# Patient Record
Sex: MALE | Race: WHITE | Employment: OTHER | URBAN - METROPOLITAN AREA
[De-identification: names, ages, dates, MRNs, and addresses within clinical notes are randomized per-mention and may not be internally consistent; named-entity substitution may affect disease eponyms.]

---

## 2017-10-20 ENCOUNTER — HOSPITAL ENCOUNTER (OUTPATIENT)
Dept: CT IMAGING | Age: 67
Discharge: HOME | End: 2017-10-20
Attending: FAMILY MEDICINE | Admitting: FAMILY MEDICINE

## 2017-10-20 DIAGNOSIS — R91.1 PULMONARY NODULE: ICD-10-CM

## 2017-11-13 ENCOUNTER — HOSPITAL ENCOUNTER (OUTPATIENT)
Dept: OTHER | Age: 67
Discharge: HOME | End: 2017-11-13
Attending: INTERNAL MEDICINE | Admitting: INTERNAL MEDICINE

## 2017-11-13 LAB
FERRITIN: 50 NG/ML (ref 30–400)
IRON: 120 UG/DL (ref 59–158)
PCT TRANSFERRIN: 34 % (ref 10–44)
TOTAL IRON BINDING CAPACITY: 352 UG/DL (ref 250–450)
UNSATURATED IRON BINDING CAPACITY: 232 UG/DL (ref 110–370)

## 2017-12-31 ENCOUNTER — HOSPITAL ENCOUNTER (OUTPATIENT)
Dept: OTHER | Age: 67
Discharge: HOME | End: 2017-12-31
Attending: NURSE PRACTITIONER | Admitting: NURSE PRACTITIONER

## 2017-12-31 DIAGNOSIS — J18.9 SYMPTOMS OF PNEUMONIA: ICD-10-CM

## 2018-06-12 LAB — DIABETIC RETINOPATHY: NEGATIVE

## 2018-11-01 LAB
ALBUMIN SERPL-MCNC: NORMAL G/DL
ALP BLD-CCNC: NORMAL U/L
ALT SERPL-CCNC: NORMAL U/L
ANION GAP SERPL CALCULATED.3IONS-SCNC: NORMAL MMOL/L
AST SERPL-CCNC: NORMAL U/L
AVERAGE GLUCOSE: ABNORMAL
BILIRUB SERPL-MCNC: NORMAL MG/DL (ref 0.1–1.4)
BUN BLDV-MCNC: 18 MG/DL
CALCIUM SERPL-MCNC: NORMAL MG/DL
CHLORIDE BLD-SCNC: NORMAL MMOL/L
CHOLESTEROL, TOTAL: 151 MG/DL
CHOLESTEROL/HDL RATIO: NORMAL
CO2: NORMAL MMOL/L
CREAT SERPL-MCNC: 1 MG/DL
CREATININE, URINE: 80.5
GFR CALCULATED: NORMAL
GLUCOSE BLD-MCNC: 153 MG/DL
HBA1C MFR BLD: 7.1 %
HDLC SERPL-MCNC: 53 MG/DL (ref 35–70)
LDL CHOLESTEROL CALCULATED: 75 MG/DL (ref 0–160)
MICROALBUMIN/CREAT 24H UR: 360 MG/G{CREAT}
MICROALBUMIN/CREAT UR-RTO: 4
POTASSIUM SERPL-SCNC: 4.8 MMOL/L
SODIUM BLD-SCNC: NORMAL MMOL/L
TOTAL PROTEIN: NORMAL
TRIGL SERPL-MCNC: 117 MG/DL
TSH SERPL DL<=0.05 MIU/L-ACNC: 2.6 UIU/ML
VLDLC SERPL CALC-MCNC: 23 MG/DL

## 2018-11-19 ENCOUNTER — HOSPITAL ENCOUNTER (OUTPATIENT)
Age: 68
Setting detail: SPECIMEN
Discharge: HOME | End: 2018-11-19
Payer: MEDICARE

## 2018-11-19 LAB
FERRITIN: 33 NG/ML (ref 30–400)
IRON: 107 UG/DL (ref 59–158)
PCT TRANSFERRIN: 30 % (ref 10–44)
TOTAL IRON BINDING CAPACITY: 362 UG/DL (ref 250–450)
UNSATURATED IRON BINDING CAPACITY: 255 UG/DL (ref 110–370)

## 2018-11-19 PROCEDURE — 83540 ASSAY OF IRON: CPT

## 2018-11-19 PROCEDURE — 82728 ASSAY OF FERRITIN: CPT

## 2018-11-19 PROCEDURE — 83550 IRON BINDING TEST: CPT

## 2019-05-07 ENCOUNTER — TELEPHONE (OUTPATIENT)
Dept: FAMILY MEDICINE CLINIC | Age: 69
End: 2019-05-07

## 2019-05-07 RX ORDER — LISINOPRIL 5 MG/1
5 TABLET ORAL DAILY
Qty: 90 TABLET | Refills: 0 | Status: SHIPPED | OUTPATIENT
Start: 2019-05-07 | End: 2019-05-08 | Stop reason: SDUPTHER

## 2019-05-07 RX ORDER — PIOGLITAZONEHYDROCHLORIDE 30 MG/1
30 TABLET ORAL DAILY
Qty: 90 TABLET | Refills: 0 | Status: SHIPPED | OUTPATIENT
Start: 2019-05-07 | End: 2019-08-01 | Stop reason: SDUPTHER

## 2019-05-07 RX ORDER — DILTIAZEM HYDROCHLORIDE 300 MG/1
300 CAPSULE, COATED, EXTENDED RELEASE ORAL DAILY
Qty: 90 CAPSULE | Refills: 0 | Status: SHIPPED | OUTPATIENT
Start: 2019-05-07 | End: 2019-08-01 | Stop reason: SDUPTHER

## 2019-05-07 RX ORDER — ATORVASTATIN CALCIUM 20 MG/1
20 TABLET, FILM COATED ORAL DAILY
Qty: 90 TABLET | Refills: 0 | Status: SHIPPED | OUTPATIENT
Start: 2019-05-07 | End: 2019-05-08 | Stop reason: SDUPTHER

## 2019-05-07 NOTE — TELEPHONE ENCOUNTER
----- Message from Milly Martinez MA sent at 5/7/2019 11:49 AM EDT -----      ----- Message -----  From: Alonzo Whalen  Sent: 5/7/2019  10:57 AM  To: Milly Martinez MA    PT REQUESTING REFILLS OF  METFORMIN 500 MG 2 AM 1 AT LUNCH 2 PM  [facility]                                                        ATORVASTATIN  20 MG 1QHS  [facility]                                                         LISINOPRIL 5MG  1 QD [facility]                                                        PIOGLITAZONE HCL  30 MG 1 QD  [facility]                                                        DILTIAZEM HCL  MG 1 QD  [facility]

## 2019-05-08 ENCOUNTER — TELEPHONE (OUTPATIENT)
Dept: FAMILY MEDICINE CLINIC | Age: 69
End: 2019-05-08

## 2019-05-08 RX ORDER — LISINOPRIL 5 MG/1
5 TABLET ORAL DAILY
Qty: 90 TABLET | Refills: 0 | Status: SHIPPED | OUTPATIENT
Start: 2019-05-08 | End: 2019-08-01 | Stop reason: SDUPTHER

## 2019-05-08 RX ORDER — ATORVASTATIN CALCIUM 20 MG/1
20 TABLET, FILM COATED ORAL DAILY
Qty: 90 TABLET | Refills: 0 | Status: SHIPPED | OUTPATIENT
Start: 2019-05-08 | End: 2019-08-01 | Stop reason: SDUPTHER

## 2019-05-08 NOTE — TELEPHONE ENCOUNTER
PER [facility]QST, RESENT MEDS TO [facility]  Electronically signed by Thong Oliveros MA on 5/8/2019 at 12:05 PM

## 2019-05-08 NOTE — TELEPHONE ENCOUNTER
----- Message from Corinn Severs sent at 5/8/2019  9:41 AM EDT -----  Contact: PATIENT  PRESCRIPTION FOR THESE WERE SENT TO [facility] ON [address] IN ERROR. PLEASE CANCEL THE SCRIPTS AT [facility],  SO INSURANCE WILL APPROVE SCRIPTS AT [facility]. METFORMIN  LISINOPRIL  ATORVASTATIN              PLEASE SEND THESE SCRIPTS TO [facility]  PHARMACY ON [facility].

## 2019-06-12 DIAGNOSIS — E11.9 TYPE 2 DIABETES MELLITUS WITHOUT COMPLICATION, WITHOUT LONG-TERM CURRENT USE OF INSULIN (HCC): Primary | ICD-10-CM

## 2019-07-09 DIAGNOSIS — E11.9 TYPE 2 DIABETES MELLITUS WITHOUT COMPLICATION, WITHOUT LONG-TERM CURRENT USE OF INSULIN (HCC): ICD-10-CM

## 2019-07-09 RX ORDER — DAPAGLIFLOZIN 5 MG/1
TABLET, FILM COATED ORAL
Qty: 30 TABLET | Refills: 0 | Status: SHIPPED | OUTPATIENT
Start: 2019-07-09 | End: 2019-08-01 | Stop reason: SDUPTHER

## 2019-07-13 DIAGNOSIS — R76.11 MANTOUX: POSITIVE: ICD-10-CM

## 2019-07-13 DIAGNOSIS — I10 HYPERTENSION, ESSENTIAL: ICD-10-CM

## 2019-07-13 DIAGNOSIS — Z86.711 HISTORY OF PULMONARY EMBOLISM: ICD-10-CM

## 2019-07-13 RX ORDER — NIACINAMIDE 500 MG
500 TABLET ORAL 2 TIMES DAILY WITH MEALS
COMMUNITY

## 2019-07-13 RX ORDER — LATANOPROST 50 UG/ML
SOLUTION/ DROPS OPHTHALMIC
COMMUNITY
Start: 2019-05-19 | End: 2020-10-02

## 2019-08-01 ENCOUNTER — OFFICE VISIT (OUTPATIENT)
Dept: FAMILY MEDICINE CLINIC | Age: 69
End: 2019-08-01
Payer: MEDICARE

## 2019-08-01 VITALS
HEART RATE: 80 BPM | SYSTOLIC BLOOD PRESSURE: 122 MMHG | BODY MASS INDEX: 31.31 KG/M2 | DIASTOLIC BLOOD PRESSURE: 80 MMHG | HEIGHT: 72 IN | WEIGHT: 231.2 LBS

## 2019-08-01 DIAGNOSIS — E11.9 TYPE 2 DIABETES MELLITUS WITHOUT COMPLICATION, WITHOUT LONG-TERM CURRENT USE OF INSULIN (HCC): ICD-10-CM

## 2019-08-01 DIAGNOSIS — D75.1 POLYCYTHEMIA: Chronic | ICD-10-CM

## 2019-08-01 DIAGNOSIS — I10 ESSENTIAL HYPERTENSION: Chronic | ICD-10-CM

## 2019-08-01 DIAGNOSIS — H40.89 OTHER SPECIFIED GLAUCOMA, UNSPECIFIED LATERALITY: ICD-10-CM

## 2019-08-01 DIAGNOSIS — E11.9 TYPE 2 DIABETES MELLITUS WITHOUT COMPLICATION, WITHOUT LONG-TERM CURRENT USE OF INSULIN (HCC): Primary | ICD-10-CM

## 2019-08-01 PROCEDURE — 99214 OFFICE O/P EST MOD 30 MIN: CPT | Performed by: FAMILY MEDICINE

## 2019-08-01 RX ORDER — ATORVASTATIN CALCIUM 20 MG/1
20 TABLET, FILM COATED ORAL DAILY
Qty: 90 TABLET | Refills: 1 | Status: SHIPPED | OUTPATIENT
Start: 2019-08-01 | End: 2020-01-06

## 2019-08-01 RX ORDER — LISINOPRIL 5 MG/1
5 TABLET ORAL DAILY
Qty: 90 TABLET | Refills: 1 | Status: SHIPPED | OUTPATIENT
Start: 2019-08-01 | End: 2020-01-07

## 2019-08-01 RX ORDER — PIOGLITAZONEHYDROCHLORIDE 30 MG/1
30 TABLET ORAL DAILY
Qty: 90 TABLET | Refills: 1 | Status: SHIPPED | OUTPATIENT
Start: 2019-08-01 | End: 2020-02-06 | Stop reason: SDUPTHER

## 2019-08-01 RX ORDER — DILTIAZEM HYDROCHLORIDE 300 MG/1
300 CAPSULE, COATED, EXTENDED RELEASE ORAL DAILY
Qty: 90 CAPSULE | Refills: 1 | Status: SHIPPED | OUTPATIENT
Start: 2019-08-01 | End: 2020-02-06 | Stop reason: SDUPTHER

## 2019-08-01 ASSESSMENT — ENCOUNTER SYMPTOMS
CHEST TIGHTNESS: 0
ABDOMINAL PAIN: 0
SHORTNESS OF BREATH: 0

## 2019-08-02 LAB
A/G RATIO: 2.4 (ref 1.1–2.2)
ALBUMIN SERPL-MCNC: 5 G/DL (ref 3.4–5)
ALP BLD-CCNC: 61 U/L (ref 40–129)
ALT SERPL-CCNC: 15 U/L (ref 10–40)
ANION GAP SERPL CALCULATED.3IONS-SCNC: 18 MMOL/L (ref 3–16)
AST SERPL-CCNC: 12 U/L (ref 15–37)
BILIRUB SERPL-MCNC: 0.4 MG/DL (ref 0–1)
BUN BLDV-MCNC: 17 MG/DL (ref 7–20)
CALCIUM SERPL-MCNC: 10.2 MG/DL (ref 8.3–10.6)
CHLORIDE BLD-SCNC: 103 MMOL/L (ref 99–110)
CHOLESTEROL, TOTAL: 167 MG/DL (ref 0–199)
CO2: 23 MMOL/L (ref 21–32)
CREAT SERPL-MCNC: 0.8 MG/DL (ref 0.8–1.3)
ESTIMATED AVERAGE GLUCOSE: 159.9 MG/DL
GFR AFRICAN AMERICAN: >60
GFR NON-AFRICAN AMERICAN: >60
GLOBULIN: 2.1 G/DL
GLUCOSE BLD-MCNC: 165 MG/DL (ref 70–99)
HBA1C MFR BLD: 7.2 %
HCT VFR BLD CALC: 48.4 % (ref 40.5–52.5)
HDLC SERPL-MCNC: 59 MG/DL (ref 40–60)
HEMOGLOBIN: 16.2 G/DL (ref 13.5–17.5)
LDL CHOLESTEROL CALCULATED: 83 MG/DL
MCH RBC QN AUTO: 32.5 PG (ref 26–34)
MCHC RBC AUTO-ENTMCNC: 33.4 G/DL (ref 31–36)
MCV RBC AUTO: 97.3 FL (ref 80–100)
PDW BLD-RTO: 14.1 % (ref 12.4–15.4)
PLATELET # BLD: 258 K/UL (ref 135–450)
PMV BLD AUTO: 7.6 FL (ref 5–10.5)
POTASSIUM SERPL-SCNC: 4.9 MMOL/L (ref 3.5–5.1)
RBC # BLD: 4.97 M/UL (ref 4.2–5.9)
SODIUM BLD-SCNC: 144 MMOL/L (ref 136–145)
TOTAL PROTEIN: 7.1 G/DL (ref 6.4–8.2)
TRIGL SERPL-MCNC: 126 MG/DL (ref 0–150)
VLDLC SERPL CALC-MCNC: 25 MG/DL
WBC # BLD: 7.5 K/UL (ref 4–11)

## 2019-10-16 ENCOUNTER — TELEPHONE (OUTPATIENT)
Dept: FAMILY MEDICINE CLINIC | Age: 69
End: 2019-10-16

## 2019-10-16 DIAGNOSIS — E11.9 TYPE 2 DIABETES MELLITUS WITHOUT COMPLICATION, WITHOUT LONG-TERM CURRENT USE OF INSULIN (HCC): ICD-10-CM

## 2019-11-19 ENCOUNTER — HOSPITAL ENCOUNTER (OUTPATIENT)
Age: 69
Setting detail: SPECIMEN
Discharge: HOME | End: 2019-11-19
Payer: MEDICARE

## 2019-11-19 LAB
FERRITIN: 44 NG/ML (ref 30–400)
IRON: 74 UG/DL (ref 59–158)
PCT TRANSFERRIN: 20 % (ref 10–44)
TOTAL IRON BINDING CAPACITY: 368 UG/DL (ref 250–450)
UNSATURATED IRON BINDING CAPACITY: 294 UG/DL (ref 110–370)

## 2019-11-19 PROCEDURE — 83540 ASSAY OF IRON: CPT

## 2019-11-19 PROCEDURE — 83550 IRON BINDING TEST: CPT

## 2019-11-19 PROCEDURE — 82728 ASSAY OF FERRITIN: CPT

## 2020-01-06 RX ORDER — ATORVASTATIN CALCIUM 20 MG/1
TABLET, FILM COATED ORAL
Qty: 90 TABLET | Refills: 0 | Status: SHIPPED | OUTPATIENT
Start: 2020-01-06 | End: 2020-02-06 | Stop reason: SDUPTHER

## 2020-01-07 RX ORDER — LISINOPRIL 5 MG/1
TABLET ORAL
Qty: 90 TABLET | Refills: 0 | Status: SHIPPED | OUTPATIENT
Start: 2020-01-07 | End: 2020-02-06 | Stop reason: SDUPTHER

## 2020-01-29 ENCOUNTER — OFFICE VISIT (OUTPATIENT)
Dept: FAMILY MEDICINE CLINIC | Age: 70
End: 2020-01-29
Payer: MEDICARE

## 2020-01-29 ENCOUNTER — HOSPITAL ENCOUNTER (OUTPATIENT)
Age: 70
Discharge: HOME | End: 2020-01-29
Payer: MEDICARE

## 2020-01-29 VITALS
HEART RATE: 99 BPM | BODY MASS INDEX: 32.18 KG/M2 | HEIGHT: 72 IN | OXYGEN SATURATION: 95 % | DIASTOLIC BLOOD PRESSURE: 80 MMHG | WEIGHT: 237.6 LBS | TEMPERATURE: 98.5 F | SYSTOLIC BLOOD PRESSURE: 120 MMHG

## 2020-01-29 LAB
ALBUMIN SERPL-MCNC: 4.7 GM/DL (ref 3.4–5)
ALP BLD-CCNC: 76 IU/L (ref 40–129)
ALT SERPL-CCNC: 19 U/L (ref 10–40)
ANION GAP SERPL CALCULATED.3IONS-SCNC: 16 MMOL/L (ref 4–16)
AST SERPL-CCNC: 15 IU/L (ref 15–37)
BASOPHILS ABSOLUTE: 0 K/CU MM
BASOPHILS RELATIVE PERCENT: 0.5 % (ref 0–1)
BILIRUB SERPL-MCNC: 0.5 MG/DL (ref 0–1)
BUN BLDV-MCNC: 15 MG/DL (ref 6–23)
CALCIUM SERPL-MCNC: 10.1 MG/DL (ref 8.3–10.6)
CHLORIDE BLD-SCNC: 99 MMOL/L (ref 99–110)
CO2: 24 MMOL/L (ref 21–32)
CREAT SERPL-MCNC: 0.9 MG/DL (ref 0.9–1.3)
DIFFERENTIAL TYPE: ABNORMAL
EOSINOPHILS ABSOLUTE: 0.1 K/CU MM
EOSINOPHILS RELATIVE PERCENT: 1.5 % (ref 0–3)
GFR AFRICAN AMERICAN: >60 ML/MIN/1.73M2
GFR NON-AFRICAN AMERICAN: >60 ML/MIN/1.73M2
GLUCOSE BLD-MCNC: 213 MG/DL (ref 70–99)
HCT VFR BLD CALC: 52 % (ref 42–52)
HEMOGLOBIN: 16.5 GM/DL (ref 13.5–18)
IMMATURE NEUTROPHIL %: 0.3 % (ref 0–0.43)
LYMPHOCYTES ABSOLUTE: 2 K/CU MM
LYMPHOCYTES RELATIVE PERCENT: 26.6 % (ref 24–44)
MCH RBC QN AUTO: 31.5 PG (ref 27–31)
MCHC RBC AUTO-ENTMCNC: 31.7 % (ref 32–36)
MCV RBC AUTO: 99.4 FL (ref 78–100)
MONOCYTES ABSOLUTE: 0.9 K/CU MM
MONOCYTES RELATIVE PERCENT: 12.7 % (ref 0–4)
NUCLEATED RBC %: 0 %
PDW BLD-RTO: 13.5 % (ref 11.7–14.9)
PLATELET # BLD: 279 K/CU MM (ref 140–440)
PMV BLD AUTO: 9.2 FL (ref 7.5–11.1)
POTASSIUM SERPL-SCNC: 4.8 MMOL/L (ref 3.5–5.1)
RBC # BLD: 5.23 M/CU MM (ref 4.6–6.2)
SEGMENTED NEUTROPHILS ABSOLUTE COUNT: 4.3 K/CU MM
SEGMENTED NEUTROPHILS RELATIVE PERCENT: 58.4 % (ref 36–66)
SODIUM BLD-SCNC: 139 MMOL/L (ref 135–145)
TOTAL IMMATURE NEUTOROPHIL: 0.02 K/CU MM
TOTAL NUCLEATED RBC: 0 K/CU MM
TOTAL PROTEIN: 7 GM/DL (ref 6.4–8.2)
WBC # BLD: 7.4 K/CU MM (ref 4–10.5)

## 2020-01-29 PROCEDURE — 99213 OFFICE O/P EST LOW 20 MIN: CPT | Performed by: NURSE PRACTITIONER

## 2020-01-29 PROCEDURE — 1123F ACP DISCUSS/DSCN MKR DOCD: CPT | Performed by: NURSE PRACTITIONER

## 2020-01-29 PROCEDURE — G8417 CALC BMI ABV UP PARAM F/U: HCPCS | Performed by: NURSE PRACTITIONER

## 2020-01-29 PROCEDURE — 4040F PNEUMOC VAC/ADMIN/RCVD: CPT | Performed by: NURSE PRACTITIONER

## 2020-01-29 PROCEDURE — 36415 COLL VENOUS BLD VENIPUNCTURE: CPT

## 2020-01-29 PROCEDURE — 87177 OVA AND PARASITES SMEARS: CPT

## 2020-01-29 PROCEDURE — 87046 STOOL CULTR AEROBIC BACT EA: CPT

## 2020-01-29 PROCEDURE — 3017F COLORECTAL CA SCREEN DOC REV: CPT | Performed by: NURSE PRACTITIONER

## 2020-01-29 PROCEDURE — 87077 CULTURE AEROBIC IDENTIFY: CPT

## 2020-01-29 PROCEDURE — 87045 FECES CULTURE AEROBIC BACT: CPT

## 2020-01-29 PROCEDURE — 1036F TOBACCO NON-USER: CPT | Performed by: NURSE PRACTITIONER

## 2020-01-29 PROCEDURE — 80053 COMPREHEN METABOLIC PANEL: CPT

## 2020-01-29 PROCEDURE — 87324 CLOSTRIDIUM AG IA: CPT

## 2020-01-29 PROCEDURE — G8427 DOCREV CUR MEDS BY ELIG CLIN: HCPCS | Performed by: NURSE PRACTITIONER

## 2020-01-29 PROCEDURE — 87209 SMEAR COMPLEX STAIN: CPT

## 2020-01-29 PROCEDURE — G8484 FLU IMMUNIZE NO ADMIN: HCPCS | Performed by: NURSE PRACTITIONER

## 2020-01-29 PROCEDURE — 85025 COMPLETE CBC W/AUTO DIFF WBC: CPT

## 2020-01-29 RX ORDER — BRIMONIDINE TARTRATE/TIMOLOL 0.2%-0.5%
1 DROPS OPHTHALMIC (EYE) 2 TIMES DAILY
COMMUNITY
Start: 2020-01-27 | End: 2020-10-02

## 2020-01-29 ASSESSMENT — ENCOUNTER SYMPTOMS
DIARRHEA: 1
NAUSEA: 0
COUGH: 0
FLATUS: 1
BLOATING: 1
ABDOMINAL PAIN: 0
RESPIRATORY NEGATIVE: 1
VOMITING: 0

## 2020-01-29 NOTE — PROGRESS NOTES
Medication Sig Dispense Refill    COMBIGAN 0.2-0.5 % ophthalmic solution Apply 1 drop to eye 2 times daily      lisinopril (PRINIVIL;ZESTRIL) 5 MG tablet TAKE 1 TABLET BY MOUTH ONE TIME A DAY  90 tablet 0    atorvastatin (LIPITOR) 20 MG tablet TAKE 1 TABLET BY MOUTH ONE TIME A DAY  90 tablet 0    pioglitazone (ACTOS) 30 MG tablet Take 1 tablet by mouth daily 90 tablet 1    metFORMIN (GLUCOPHAGE) 500 MG tablet Take 1 tablet by mouth 2 times daily (with meals) TAKE 2 TABS AM, 1 TAB AT NOON, 2 TABS  tablet 1    dapagliflozin (FARXIGA) 5 MG tablet TAKE 1 TABLET BY MOUTH ONE TIME A DAY 30 tablet 5    diltiazem (CARTIA XT) 300 MG extended release capsule Take 1 capsule by mouth daily 90 capsule 1    latanoprost (XALATAN) 0.005 % ophthalmic solution       niacinamide 500 MG tablet Take 500 mg by mouth 2 times daily (with meals)      Multiple Vitamin (MULTIVITAMIN PO) Take  by mouth daily.  PANTOTHENIC ACID PO Take 500 mg by mouth daily.  Multiple Vitamins-Minerals (OCUVITE) TABS oral tablet Take 1 tablet by mouth daily.  vitamin D3 (CHOLECALCIFEROL) 400 units TABS tablet Take by mouth daily        No current facility-administered medications for this visit. Past medical, family,surgical history reviewed today. Objective:  /80   Pulse 99   Temp 98.5 °F (36.9 °C) (Oral)   Ht 5' 11.5\" (1.816 m)   Wt 237 lb 9.6 oz (107.8 kg)   SpO2 95%   BMI 32.68 kg/m²   BP Readings from Last 3 Encounters:   01/29/20 120/80   08/01/19 122/80   10/20/15 142/84     Wt Readings from Last 3 Encounters:   01/29/20 237 lb 9.6 oz (107.8 kg)   08/01/19 231 lb 3.2 oz (104.9 kg)   10/20/15 248 lb (112.5 kg)         Physical Exam  Constitutional:       Appearance: Normal appearance. HENT:      Head: Normocephalic. Neck:      Musculoskeletal: Neck supple. Cardiovascular:      Rate and Rhythm: Normal rate and regular rhythm. Heart sounds: Normal heart sounds.    Pulmonary:      Effort: patient. Questions answered. Patient verbalizes understanding and agrees with plan. After visit summary provided. Advised to call for any problems, questions, or concerns. Return if symptoms worsen or fail to improve.                                            Signed:  MARIANELA Pedro CNP  01/29/20  10:44 AM

## 2020-01-30 LAB
CLOSTRIDIUM DIFFICILE, PCR: NORMAL
CLOSTRIDIUM DIFFICILE, PCR: NORMAL

## 2020-02-01 LAB
CULTURE: NORMAL
Lab: NORMAL
SPECIMEN: NORMAL

## 2020-02-04 LAB
OVA AND PARASITE WET MOUNT: NEGATIVE
TRICHROME SMEAR, STOOL O&P: NEGATIVE
TRICHROME SMEAR, STOOL O&P: NORMAL

## 2020-02-06 ENCOUNTER — OFFICE VISIT (OUTPATIENT)
Dept: FAMILY MEDICINE CLINIC | Age: 70
End: 2020-02-06
Payer: MEDICARE

## 2020-02-06 VITALS
WEIGHT: 234.6 LBS | HEART RATE: 90 BPM | DIASTOLIC BLOOD PRESSURE: 84 MMHG | BODY MASS INDEX: 31.77 KG/M2 | OXYGEN SATURATION: 96 % | HEIGHT: 72 IN | SYSTOLIC BLOOD PRESSURE: 120 MMHG

## 2020-02-06 DIAGNOSIS — E11.9 TYPE 2 DIABETES MELLITUS WITHOUT COMPLICATION, WITHOUT LONG-TERM CURRENT USE OF INSULIN (HCC): ICD-10-CM

## 2020-02-06 PROCEDURE — 99214 OFFICE O/P EST MOD 30 MIN: CPT | Performed by: FAMILY MEDICINE

## 2020-02-06 RX ORDER — ATORVASTATIN CALCIUM 20 MG/1
TABLET, FILM COATED ORAL
Qty: 90 TABLET | Refills: 1 | Status: SHIPPED | OUTPATIENT
Start: 2020-02-06 | End: 2020-09-17 | Stop reason: SDUPTHER

## 2020-02-06 RX ORDER — LISINOPRIL 5 MG/1
TABLET ORAL
Qty: 90 TABLET | Refills: 1 | Status: SHIPPED | OUTPATIENT
Start: 2020-02-06 | End: 2020-11-05 | Stop reason: SDUPTHER

## 2020-02-06 RX ORDER — PIOGLITAZONEHYDROCHLORIDE 30 MG/1
30 TABLET ORAL DAILY
Qty: 90 TABLET | Refills: 1 | Status: SHIPPED | OUTPATIENT
Start: 2020-02-06 | End: 2020-09-17 | Stop reason: SDUPTHER

## 2020-02-06 RX ORDER — CHOLESTYRAMINE 4 G/9G
1 POWDER, FOR SUSPENSION ORAL 2 TIMES DAILY
Qty: 40 PACKET | Refills: 1 | Status: SHIPPED | OUTPATIENT
Start: 2020-02-06 | End: 2020-10-02

## 2020-02-06 RX ORDER — DILTIAZEM HYDROCHLORIDE 300 MG/1
300 CAPSULE, COATED, EXTENDED RELEASE ORAL DAILY
Qty: 90 CAPSULE | Refills: 1 | Status: SHIPPED | OUTPATIENT
Start: 2020-02-06 | End: 2020-09-17 | Stop reason: SDUPTHER

## 2020-02-06 ASSESSMENT — ENCOUNTER SYMPTOMS
DIARRHEA: 1
NAUSEA: 0
CHEST TIGHTNESS: 0
TROUBLE SWALLOWING: 0
SHORTNESS OF BREATH: 0
VOMITING: 0
ABDOMINAL PAIN: 0
BLOOD IN STOOL: 0
WHEEZING: 0
EYE PAIN: 0

## 2020-02-06 NOTE — PROGRESS NOTES
tablet Take by mouth daily        No current facility-administered medications for this visit. ALLERGIES  Allergies   Allergen Reactions    Pollen Extract        PHYSICAL EXAM    /84 (Site: Left Upper Arm, Position: Sitting, Cuff Size: Medium Adult)   Pulse 90   Ht 5' 11.5\" (1.816 m)   Wt 234 lb 9.6 oz (106.4 kg)   SpO2 96%   BMI 32.26 kg/m²     Physical Exam  Vitals signs and nursing note reviewed. Constitutional:       Appearance: He is well-developed. HENT:      Head: Normocephalic and atraumatic. Eyes:      Pupils: Pupils are equal, round, and reactive to light. Neck:      Musculoskeletal: Normal range of motion and neck supple. Cardiovascular:      Rate and Rhythm: Normal rate and regular rhythm. Heart sounds: Normal heart sounds. Pulmonary:      Effort: Pulmonary effort is normal.      Breath sounds: Normal breath sounds. Abdominal:      Palpations: Abdomen is soft. Musculoskeletal: Normal range of motion. Skin:     General: Skin is warm and dry. Neurological:      Mental Status: He is alert and oriented to person, place, and time. Psychiatric:         Thought Content: Thought content normal.         ASSESSMENT & PLAN     Diagnosis Orders   1. Diarrhea, unspecified type  Amb External Referral To Gastroenterology   2. Need for prophylactic vaccination against diphtheria-tetanus-pertussis (DTP)  Tdap (ADACEL) 5-2-15.5 LF-MCG/0.5 injection   3. Type 2 diabetes mellitus without complication, without long-term current use of insulin ([facility])   DIABETES FOOT EXAM    dapagliflozin (FARXIGA) 5 MG tablet    HEMOGLOBIN A1C   Extensive discussion carried out- at this point continue on plenty of clear liquids and avoid milk products if possible. Add a probiotic(otc) on a daily basis and will treat him with generic Questran 4 g twice daily. He is due for an A1c today- this will be done. We will also check a diatherix swab for stool antigens.   He is to have an appointment made with Dr. Rice Jorge his GI physician and stay on usual regimen otherwise. He will monitor sugars and symptoms. He is to call with questions or problems. Return if symptoms worsen or fail to improve.          Electronically signed by Faustino Gardiner MD on 2/6/2020

## 2020-02-07 LAB
ESTIMATED AVERAGE GLUCOSE: 171.4 MG/DL
HBA1C MFR BLD: 7.6 %

## 2020-03-04 ENCOUNTER — HOSPITAL ENCOUNTER (OUTPATIENT)
Dept: GENERAL RADIOLOGY | Age: 70
Discharge: HOME | End: 2020-03-04
Payer: MEDICARE

## 2020-03-04 PROCEDURE — 74248 X-RAY SM INT F-THRU STD: CPT

## 2020-03-16 RX ORDER — ATORVASTATIN CALCIUM 20 MG/1
TABLET, FILM COATED ORAL
Qty: 90 TABLET | Refills: 0 | OUTPATIENT
Start: 2020-03-16

## 2020-05-22 ENCOUNTER — TELEPHONE (OUTPATIENT)
Dept: FAMILY MEDICINE CLINIC | Age: 70
End: 2020-05-22

## 2020-06-02 ENCOUNTER — OFFICE VISIT (OUTPATIENT)
Dept: FAMILY MEDICINE CLINIC | Age: 70
End: 2020-06-02
Payer: MEDICARE

## 2020-06-02 VITALS
BODY MASS INDEX: 31.97 KG/M2 | DIASTOLIC BLOOD PRESSURE: 80 MMHG | HEART RATE: 102 BPM | SYSTOLIC BLOOD PRESSURE: 118 MMHG | HEIGHT: 72 IN | OXYGEN SATURATION: 96 % | TEMPERATURE: 97.9 F | WEIGHT: 236 LBS

## 2020-06-02 DIAGNOSIS — R53.82 CHRONIC FATIGUE: ICD-10-CM

## 2020-06-02 DIAGNOSIS — I10 HYPERTENSION, ESSENTIAL: ICD-10-CM

## 2020-06-02 DIAGNOSIS — E11.9 TYPE 2 DIABETES MELLITUS WITHOUT COMPLICATION, WITHOUT LONG-TERM CURRENT USE OF INSULIN (HCC): ICD-10-CM

## 2020-06-02 PROBLEM — K59.1 FUNCTIONAL DIARRHEA: Status: ACTIVE | Noted: 2020-06-02

## 2020-06-02 LAB
A/G RATIO: 2.2 (ref 1.1–2.2)
ALBUMIN SERPL-MCNC: 4.6 G/DL (ref 3.4–5)
ALP BLD-CCNC: 56 U/L (ref 40–129)
ALT SERPL-CCNC: 13 U/L (ref 10–40)
ANION GAP SERPL CALCULATED.3IONS-SCNC: 13 MMOL/L (ref 3–16)
AST SERPL-CCNC: 12 U/L (ref 15–37)
BILIRUB SERPL-MCNC: 0.5 MG/DL (ref 0–1)
BUN BLDV-MCNC: 13 MG/DL (ref 7–20)
CALCIUM SERPL-MCNC: 9.7 MG/DL (ref 8.3–10.6)
CHLORIDE BLD-SCNC: 104 MMOL/L (ref 99–110)
CHOLESTEROL, TOTAL: 146 MG/DL (ref 0–199)
CO2: 25 MMOL/L (ref 21–32)
CREAT SERPL-MCNC: 0.9 MG/DL (ref 0.8–1.3)
GFR AFRICAN AMERICAN: >60
GFR NON-AFRICAN AMERICAN: >60
GLOBULIN: 2.1 G/DL
GLUCOSE BLD-MCNC: 192 MG/DL (ref 70–99)
HCT VFR BLD CALC: 49.2 % (ref 40.5–52.5)
HDLC SERPL-MCNC: 47 MG/DL (ref 40–60)
HEMOGLOBIN: 16.4 G/DL (ref 13.5–17.5)
LDL CHOLESTEROL CALCULATED: 68 MG/DL
MCH RBC QN AUTO: 32.2 PG (ref 26–34)
MCHC RBC AUTO-ENTMCNC: 33.4 G/DL (ref 31–36)
MCV RBC AUTO: 96.4 FL (ref 80–100)
PDW BLD-RTO: 14.1 % (ref 12.4–15.4)
PLATELET # BLD: 306 K/UL (ref 135–450)
PMV BLD AUTO: 7.4 FL (ref 5–10.5)
POTASSIUM SERPL-SCNC: 4.6 MMOL/L (ref 3.5–5.1)
RBC # BLD: 5.1 M/UL (ref 4.2–5.9)
SODIUM BLD-SCNC: 142 MMOL/L (ref 136–145)
TOTAL PROTEIN: 6.7 G/DL (ref 6.4–8.2)
TRIGL SERPL-MCNC: 154 MG/DL (ref 0–150)
TSH REFLEX: 3.5 UIU/ML (ref 0.27–4.2)
VLDLC SERPL CALC-MCNC: 31 MG/DL
WBC # BLD: 6.6 K/UL (ref 4–11)

## 2020-06-02 PROCEDURE — 3051F HG A1C>EQUAL 7.0%<8.0%: CPT | Performed by: FAMILY MEDICINE

## 2020-06-02 PROCEDURE — 99214 OFFICE O/P EST MOD 30 MIN: CPT | Performed by: FAMILY MEDICINE

## 2020-06-02 ASSESSMENT — ENCOUNTER SYMPTOMS
SHORTNESS OF BREATH: 0
EYE PAIN: 0
ABDOMINAL PAIN: 0
VOMITING: 0
NAUSEA: 0
BLOOD IN STOOL: 0
WHEEZING: 0
DIARRHEA: 0
TROUBLE SWALLOWING: 0
CHEST TIGHTNESS: 0

## 2020-06-02 NOTE — PROGRESS NOTES
facility-administered medications for this visit. ALLERGIES  Allergies   Allergen Reactions    Bee Pollen     Pollen Extract        PHYSICAL EXAM    /80 (Site: Right Upper Arm, Position: Sitting, Cuff Size: Large Adult)   Pulse 102   Temp 97.9 °F (36.6 °C)   Ht 5' 11.5\" (1.816 m)   Wt 236 lb (107 kg)   SpO2 96%   BMI 32.46 kg/m²     Physical Exam  Vitals signs and nursing note reviewed. Constitutional:       General: He is not in acute distress. Appearance: Normal appearance. He is well-developed. He is not ill-appearing or toxic-appearing. HENT:      Head: Normocephalic and atraumatic. Nose: Nose normal.      Mouth/Throat:      Mouth: Mucous membranes are moist.      Pharynx: Oropharynx is clear. Eyes:      Pupils: Pupils are equal, round, and reactive to light. Neck:      Musculoskeletal: Normal range of motion and neck supple. No neck rigidity. Cardiovascular:      Rate and Rhythm: Normal rate and regular rhythm. Heart sounds: Normal heart sounds. Pulmonary:      Effort: Pulmonary effort is normal.      Breath sounds: Normal breath sounds. Abdominal:      Palpations: Abdomen is soft. Musculoskeletal: Normal range of motion. Skin:     General: Skin is warm and dry. Neurological:      General: No focal deficit present. Mental Status: He is alert and oriented to person, place, and time. Mental status is at baseline. Cranial Nerves: No cranial nerve deficit. Psychiatric:         Mood and Affect: Mood normal.         Behavior: Behavior normal.         Thought Content: Thought content normal.         ASSESSMENT & PLAN     Diagnosis Orders   1. Type 2 diabetes mellitus without complication, without long-term current use of insulin ([facility])  HEMOGLOBIN A1C    TSH with Reflex    LIPID PANEL   2. Hypertension, essential  COMPREHENSIVE METABOLIC PANEL   3. Chronic fatigue  CBC   4.  Functional diarrhea  External Referral To Gastroenterology   Reinforced diabetic diet and increase in exercise. Monitor blood sugars. We will check CBC, CMP, A1c, TSH, lipid panel, and make referral to his GI specialist of choice in [place](Dr Law or Dr Jonathan Mcdaniel). He is to follow-up for today's testing results. Return in about 4 months (around 10/2/2020).          Electronically signed by Luma Andre MD on 6/2/2020

## 2020-06-03 LAB
ESTIMATED AVERAGE GLUCOSE: 168.6 MG/DL
HBA1C MFR BLD: 7.5 %

## 2020-06-24 ENCOUNTER — VIRTUAL VISIT (OUTPATIENT)
Dept: FAMILY MEDICINE CLINIC | Age: 70
End: 2020-06-24
Payer: MEDICARE

## 2020-06-24 VITALS — HEIGHT: 72 IN | WEIGHT: 233 LBS | BODY MASS INDEX: 31.56 KG/M2 | TEMPERATURE: 98.7 F

## 2020-06-24 PROCEDURE — 4040F PNEUMOC VAC/ADMIN/RCVD: CPT | Performed by: FAMILY MEDICINE

## 2020-06-24 PROCEDURE — 3017F COLORECTAL CA SCREEN DOC REV: CPT | Performed by: FAMILY MEDICINE

## 2020-06-24 PROCEDURE — G0438 PPPS, INITIAL VISIT: HCPCS | Performed by: FAMILY MEDICINE

## 2020-06-24 PROCEDURE — 1123F ACP DISCUSS/DSCN MKR DOCD: CPT | Performed by: FAMILY MEDICINE

## 2020-06-24 RX ORDER — SENNOSIDES 8.6 MG/1
TABLET ORAL
COMMUNITY
Start: 2020-06-10 | End: 2021-02-03

## 2020-06-24 RX ORDER — MELOXICAM 15 MG/1
TABLET ORAL
COMMUNITY
Start: 2020-06-15 | End: 2020-10-02

## 2020-06-24 ASSESSMENT — PATIENT HEALTH QUESTIONNAIRE - PHQ9
SUM OF ALL RESPONSES TO PHQ QUESTIONS 1-9: 2
SUM OF ALL RESPONSES TO PHQ QUESTIONS 1-9: 2

## 2020-06-24 ASSESSMENT — LIFESTYLE VARIABLES: HOW OFTEN DO YOU HAVE A DRINK CONTAINING ALCOHOL: 0

## 2020-06-24 NOTE — PROGRESS NOTES
lb 9.6 oz (106.4 kg)     Vitals:    06/24/20 0904   Temp: 98.7 °F (37.1 °C)   TempSrc: Oral   Weight: 233 lb (105.7 kg)   Height: 5' 11.5\" (1.816 m)     Body mass index is 32.04 kg/m². Based upon direct observation of the patient, evaluation of cognition reveals recent and remote memory intact. Patient's complete Health Risk Assessment and screening values have been reviewed and are found in Flowsheets. The following problems were reviewed today and where indicated follow up appointments were made and/or referrals ordered. Positive Risk Factor Screenings with Interventions:     General Health:  General  In general, how would you say your health is?: Fair  In the past 7 days, have you experienced any of the following? New or Increased Pain, New or Increased Fatigue, Loneliness, Social Isolation, Stress or Anger?: (!) New or Increased Pain  Do you get the social and emotional support that you need?: Yes  Do you have a Living Will?: Yes  General Health Risk Interventions:  · Pain issues: patient declines any further evaluation/treatment for this issue. Patient states that he has been seen for this issue, he has just pulled his hamstring muscle. Health Habits/Nutrition:  Health Habits/Nutrition  Do you exercise for at least 20 minutes 2-3 times per week?: (!) No  Have you lost any weight without trying in the past 3 months?: No  Do you eat fewer than 2 meals per day?: No  Have you seen a dentist within the past year?: Yes  Body mass index is 32.04 kg/m². Health Habits/Nutrition Interventions:  · Inadequate physical activity:  patient is not ready to increase his/her physical activity level at this time. Patient states as soon as his pulled muscle is healed that he will begin to exercise again.     Personalized Preventive Plan   Current Health Maintenance Status  Immunization History   Administered Date(s) Administered    Hep B/Hib (Comvax) 01/09/2009, 02/09/2009, 07/14/2009    Influenza Virus Vaccine Vitals/Constitutional/EENT/Resp/CV/GI//MS/Neuro/Skin/Heme-Lymph-Imm. Pursuant to the emergency declaration under the [address] [place] [address] and the [employer] and [employer] Act, this Virtual Visit was conducted with patient's (and/or legal guardian's) consent, to reduce the patient's risk of exposure to COVID-19 and provide necessary medical care. The patient (and/or legal guardian) has also been advised to contact this office for worsening conditions or problems, and seek emergency medical treatment and/or call 911 if deemed necessary. Patient identification was verified at the start of the visit: Yes    Total time spent for this encounter: 15 minutes    Services were provided through audio synchronous discussion virtually to substitute for in-person clinic visit. Patient and provider were located at their individual homes. --Eduardo Badillo LPN on 6/87/1697 at 2:87 AM    An electronic signature was used to authenticate this note. This encounter was performed under myRosa MDs, direct supervision, 6/24/2020.

## 2020-07-02 RX ORDER — ATORVASTATIN CALCIUM 20 MG/1
TABLET, FILM COATED ORAL
Qty: 90 TABLET | Refills: 0 | OUTPATIENT
Start: 2020-07-02

## 2020-09-17 RX ORDER — ATORVASTATIN CALCIUM 20 MG/1
TABLET, FILM COATED ORAL
Qty: 90 TABLET | Refills: 0 | Status: SHIPPED | OUTPATIENT
Start: 2020-09-17 | End: 2020-12-18 | Stop reason: SDUPTHER

## 2020-09-17 RX ORDER — PIOGLITAZONEHYDROCHLORIDE 30 MG/1
30 TABLET ORAL DAILY
Qty: 90 TABLET | Refills: 0 | Status: SHIPPED | OUTPATIENT
Start: 2020-09-17 | End: 2021-01-06 | Stop reason: SDUPTHER

## 2020-09-17 RX ORDER — DILTIAZEM HYDROCHLORIDE 300 MG/1
300 CAPSULE, COATED, EXTENDED RELEASE ORAL DAILY
Qty: 90 CAPSULE | Refills: 0 | Status: SHIPPED | OUTPATIENT
Start: 2020-09-17 | End: 2020-12-18 | Stop reason: SDUPTHER

## 2020-10-02 ENCOUNTER — OFFICE VISIT (OUTPATIENT)
Dept: FAMILY MEDICINE CLINIC | Age: 70
End: 2020-10-02
Payer: MEDICARE

## 2020-10-02 VITALS
HEART RATE: 88 BPM | WEIGHT: 235.2 LBS | TEMPERATURE: 97.2 F | BODY MASS INDEX: 31.86 KG/M2 | OXYGEN SATURATION: 97 % | HEIGHT: 72 IN | DIASTOLIC BLOOD PRESSURE: 74 MMHG | SYSTOLIC BLOOD PRESSURE: 116 MMHG

## 2020-10-02 DIAGNOSIS — E11.9 TYPE 2 DIABETES MELLITUS WITHOUT COMPLICATION, WITHOUT LONG-TERM CURRENT USE OF INSULIN (HCC): Chronic | ICD-10-CM

## 2020-10-02 LAB
CREATININE URINE: 79.6 MG/DL (ref 39–259)
MICROALBUMIN UR-MCNC: <1.2 MG/DL
MICROALBUMIN/CREAT UR-RTO: NORMAL MG/G (ref 0–30)

## 2020-10-02 PROCEDURE — 1036F TOBACCO NON-USER: CPT | Performed by: FAMILY MEDICINE

## 2020-10-02 PROCEDURE — 2022F DILAT RTA XM EVC RTNOPTHY: CPT | Performed by: FAMILY MEDICINE

## 2020-10-02 PROCEDURE — 1123F ACP DISCUSS/DSCN MKR DOCD: CPT | Performed by: FAMILY MEDICINE

## 2020-10-02 PROCEDURE — 4040F PNEUMOC VAC/ADMIN/RCVD: CPT | Performed by: FAMILY MEDICINE

## 2020-10-02 PROCEDURE — 99214 OFFICE O/P EST MOD 30 MIN: CPT | Performed by: FAMILY MEDICINE

## 2020-10-02 PROCEDURE — G8427 DOCREV CUR MEDS BY ELIG CLIN: HCPCS | Performed by: FAMILY MEDICINE

## 2020-10-02 PROCEDURE — 90732 PPSV23 VACC 2 YRS+ SUBQ/IM: CPT | Performed by: FAMILY MEDICINE

## 2020-10-02 PROCEDURE — 3051F HG A1C>EQUAL 7.0%<8.0%: CPT | Performed by: FAMILY MEDICINE

## 2020-10-02 PROCEDURE — G8417 CALC BMI ABV UP PARAM F/U: HCPCS | Performed by: FAMILY MEDICINE

## 2020-10-02 PROCEDURE — 3017F COLORECTAL CA SCREEN DOC REV: CPT | Performed by: FAMILY MEDICINE

## 2020-10-02 PROCEDURE — G0009 ADMIN PNEUMOCOCCAL VACCINE: HCPCS | Performed by: FAMILY MEDICINE

## 2020-10-02 PROCEDURE — 90694 VACC AIIV4 NO PRSRV 0.5ML IM: CPT | Performed by: FAMILY MEDICINE

## 2020-10-02 PROCEDURE — G0008 ADMIN INFLUENZA VIRUS VAC: HCPCS | Performed by: FAMILY MEDICINE

## 2020-10-02 PROCEDURE — G8484 FLU IMMUNIZE NO ADMIN: HCPCS | Performed by: FAMILY MEDICINE

## 2020-10-02 ASSESSMENT — ENCOUNTER SYMPTOMS
VOMITING: 0
DIARRHEA: 0
TROUBLE SWALLOWING: 0
CHEST TIGHTNESS: 0
EYE PAIN: 0
NAUSEA: 0
BLOOD IN STOOL: 0
SHORTNESS OF BREATH: 0
WHEEZING: 0
ABDOMINAL PAIN: 0

## 2020-10-02 NOTE — PROGRESS NOTES
10/2/2020    [facility]    Chief Complaint   Patient presents with    3 Month Follow-Up     4 month, would like a flu shot        HPI  History was obtained from patient. Efren Gaines is a 69 y.o. male who presents today with follow-up on diabetes mellitus type 2, hypertension, hyperlipidemia, and history glaucoma. Remote history of polycythemia doing well. His chronic diarrhea has slowly subsided did have GI evaluation earlier this year. Has had diabetic eye exam as well as follow-up for glaucoma that has been fairly stable. Last labs were in June 2020 and they were satisfactory A1c was 7.5. He is not as active as he had been because of the severe hamstring strain or tear earlier this summer is just now getting back to his regular activities. Meds are otherwise well-tolerated mood remains good denies other acute change. REVIEW OF SYMPTOMS    Review of Systems   Constitutional: Negative for activity change and fatigue. HENT: Negative for congestion, hearing loss, mouth sores and trouble swallowing. Eyes: Negative for pain and visual disturbance. Respiratory: Negative for chest tightness, shortness of breath and wheezing. Cardiovascular: Negative for chest pain and palpitations. Gastrointestinal: Negative for abdominal pain, blood in stool, diarrhea, nausea and vomiting. Endocrine: Negative for polydipsia and polyuria. Genitourinary: Negative for dysuria, frequency and urgency. Musculoskeletal: Positive for arthralgias and myalgias. Negative for gait problem and neck stiffness. Skin: Negative for rash. Allergic/Immunologic: Negative for environmental allergies. Neurological: Negative for dizziness, seizures, speech difficulty and weakness. Hematological: Does not bruise/bleed easily. Psychiatric/Behavioral: Negative for agitation, confusion and hallucinations.        PAST MEDICAL HISTORY  Past Medical History:   Diagnosis Date    Benign prostatic hyperplasia     DVT (deep venous thrombosis) [facility] [address]     Hemochromatosis     History of pulmonary embolism     Hyperlipidemia     Hypertension, essential     Kidney stone     in er for kidney stones(1/14/13), had Dilaudid, saw Dr Sueanne Goodpasture, did pass one\"    Major depressive disorder     Mantoux: positive     Polycythemia vera(238.4) dx late 1980s    \"per wife-(1/18/13) \"according to Dr Terie Holstein he does not have this but he does have high iron\"    Pulmonary embolism [facility])     old chart gives hx brant PE with right lower DVT 7/2012(pc)(had scope left knee 4/2012)- had Filter placement done 7/31/2012    Tinnitus     Type 2 diabetes mellitus ([facility] [address]     dx 2003       FAMILY HISTORY  Family History   Problem Relation Age of Onset    Arthritis Mother     Cancer Mother         kidney cancer    Miscarriages / Stillbirths Mother     Stroke Mother     Heart Disease Father         MI    High Cholesterol Father     Kidney Disease Brother     Heart Disease Son     Stroke Son         age 32       SOCIAL HISTORY  Social History     Socioeconomic History    Marital status:      Spouse name: Not on file    Number of children: Not on file    Years of education: Not on file    Highest education level: Not on file   Occupational History    Not on file   Social Needs    Financial resource strain: Not on file    Food insecurity     Worry: Not on file     Inability: Not on file    Transportation needs     Medical: Not on file     Non-medical: Not on file   Tobacco Use    Smoking status: Never Smoker    Smokeless tobacco: Never Used   Substance and Sexual Activity    Alcohol use: No    Drug use: No    Sexual activity: Not on file   Lifestyle    Physical activity     Days per week: Not on file     Minutes per session: Not on file    Stress: Not on file   Relationships    Social connections     Talks on phone: Not on file     Gets together: Not on file     Attends Scientologist service: Not on file     Active member of club or organization: Not on file     Attends meetings of clubs or organizations: Not on file     Relationship status: Not on file    Intimate partner violence     Fear of current or ex partner: Not on file     Emotionally abused: Not on file     Physically abused: Not on file     Forced sexual activity: Not on file   Other Topics Concern    Not on file   Social History Narrative    Not on file        SURGICAL HISTORY  Past Surgical History:   Procedure Laterality Date    COLONOSCOPY      KNEE ARTHROSCOPY Right 04/2012    LITHOTRIPSY      SKIN BIOPSY  June 2012    Spermatocelectomy    VASECTOMY  1980    VENA CAVA FILTER PLACEMENT                   CURRENT MEDICATIONS  Current Outpatient Medications   Medication Sig Dispense Refill    pioglitazone (ACTOS) 30 MG tablet Take 1 tablet by mouth daily 90 tablet 0    dilTIAZem (CARTIA XT) 300 MG extended release capsule Take 1 capsule by mouth daily 90 capsule 0    atorvastatin (LIPITOR) 20 MG tablet TAKE 1 TABLET BY MOUTH ONE TIME A DAY 90 tablet 0    dapagliflozin (FARXIGA) 5 MG tablet TAKE 1 TABLET BY MOUTH ONE TIME A DAY 30 tablet 2    metFORMIN (GLUCOPHAGE) 500 MG tablet TAKE 2 TABS BY MOUTH IN THE MORNING, 1 TAB AT NOON, AND 2 TABS IN THE EVENING WITH MEALS 450 tablet 0    SM SENNA LAXATIVE 8.6 MG tablet TAKE 1 TABLET BY MOUTH ONE TIME A DAY      lisinopril (PRINIVIL;ZESTRIL) 5 MG tablet TAKE 1 TABLET BY MOUTH ONE TIME A DAY 90 tablet 1    niacinamide 500 MG tablet Take 500 mg by mouth 2 times daily (with meals)      Multiple Vitamin (MULTIVITAMIN PO) Take  by mouth daily.  PANTOTHENIC ACID PO Take 500 mg by mouth daily.  Multiple Vitamins-Minerals (OCUVITE) TABS oral tablet Take 1 tablet by mouth daily.  vitamin D3 (CHOLECALCIFEROL) 400 units TABS tablet Take by mouth daily        No current facility-administered medications for this visit.         ALLERGIES  Allergies   Allergen Reactions    Bee Pollen     Pollen Extract        PHYSICAL EXAM    BP 116/74 (Site: Right Upper Arm, Position: Sitting, Cuff Size: Medium Adult)   Pulse 88   Temp 97.2 °F (36.2 °C)   Ht 5' 11.5\" (1.816 m)   Wt 235 lb 3.2 oz (106.7 kg)   SpO2 97%   BMI 32.35 kg/m²     Physical Exam  Vitals signs and nursing note reviewed. Constitutional:       Appearance: Normal appearance. He is well-developed. He is not ill-appearing. HENT:      Head: Normocephalic and atraumatic. Nose: Nose normal.      Mouth/Throat:      Mouth: Mucous membranes are moist.   Eyes:      Pupils: Pupils are equal, round, and reactive to light. Neck:      Musculoskeletal: Normal range of motion and neck supple. Cardiovascular:      Rate and Rhythm: Normal rate and regular rhythm. Heart sounds: Normal heart sounds. Pulmonary:      Effort: Pulmonary effort is normal.      Breath sounds: Normal breath sounds. Abdominal:      Palpations: Abdomen is soft. Musculoskeletal: Normal range of motion. Skin:     General: Skin is warm and dry. Neurological:      General: No focal deficit present. Mental Status: He is alert and oriented to person, place, and time. Mental status is at baseline. Cranial Nerves: No cranial nerve deficit. Motor: No weakness. Psychiatric:         Mood and Affect: Mood normal.         Behavior: Behavior normal.         Thought Content: Thought content normal.         ASSESSMENT & PLAN     Diagnosis Orders   1. Polycythemia     2. Type 2 diabetes mellitus without complication, without long-term current use of insulin ([facility])  MICROALBUMIN / CREATININE URINE RATIO    HEMOGLOBIN A1C   3. Essential hypertension     4. Other specified glaucoma, unspecified laterality     At this point he did receive a high-dose flu shot was encouraged to work on increased exercise. He also received a Pneumovax 23. Today we will check urine for microalbumin and draw an A1c- he is to follow-up for these test results.   Would provide refills as needed -otherwise continue with social distancing and healthy lifestyle. Jose Peña Return in about 4 months (around 2/2/2021).          Electronically signed by Allie Franco MD on 10/2/2020

## 2020-10-03 LAB
ESTIMATED AVERAGE GLUCOSE: 159.9 MG/DL
HBA1C MFR BLD: 7.2 %

## 2020-11-05 RX ORDER — LISINOPRIL 5 MG/1
TABLET ORAL
Qty: 90 TABLET | Refills: 1 | Status: SHIPPED | OUTPATIENT
Start: 2020-11-05 | End: 2021-05-03 | Stop reason: SDUPTHER

## 2020-11-19 ENCOUNTER — HOSPITAL ENCOUNTER (OUTPATIENT)
Dept: INFUSION THERAPY | Age: 70
Discharge: HOME | End: 2020-11-19
Payer: MEDICARE

## 2020-11-19 LAB
BASOPHILS ABSOLUTE: 0 K/CU MM
BASOPHILS RELATIVE PERCENT: 0.4 % (ref 0–1)
DIFFERENTIAL TYPE: ABNORMAL
EOSINOPHILS ABSOLUTE: 0.1 K/CU MM
EOSINOPHILS RELATIVE PERCENT: 1.4 % (ref 0–3)
FERRITIN: 50 NG/ML (ref 30–400)
HCT VFR BLD CALC: 47.9 % (ref 42–52)
HEMOGLOBIN: 16.4 GM/DL (ref 13.5–18)
IRON: 120 UG/DL (ref 59–158)
LYMPHOCYTES ABSOLUTE: 2 K/CU MM
LYMPHOCYTES RELATIVE PERCENT: 27.7 % (ref 24–44)
MCH RBC QN AUTO: 31.9 PG (ref 27–31)
MCHC RBC AUTO-ENTMCNC: 34.2 % (ref 32–36)
MCV RBC AUTO: 93.2 FL (ref 78–100)
MONOCYTES ABSOLUTE: 0.7 K/CU MM
MONOCYTES RELATIVE PERCENT: 8.8 % (ref 0–4)
PDW BLD-RTO: 14.6 % (ref 11.7–14.9)
PLATELET # BLD: 239 K/CU MM (ref 140–440)
PMV BLD AUTO: 8.5 FL (ref 7.5–11.1)
RBC # BLD: 5.14 M/CU MM (ref 4.6–6.2)
SEGMENTED NEUTROPHILS ABSOLUTE COUNT: 4.6 K/CU MM
SEGMENTED NEUTROPHILS RELATIVE PERCENT: 61.7 % (ref 36–66)
WBC # BLD: 7.4 K/CU MM (ref 4–10.5)

## 2020-11-19 PROCEDURE — 83540 ASSAY OF IRON: CPT

## 2020-11-19 PROCEDURE — 36415 COLL VENOUS BLD VENIPUNCTURE: CPT

## 2020-11-19 PROCEDURE — 85025 COMPLETE CBC W/AUTO DIFF WBC: CPT

## 2020-11-19 PROCEDURE — 82728 ASSAY OF FERRITIN: CPT

## 2020-12-01 NOTE — PROGRESS NOTES
Patient Name: Renato Wilder  Patient : 1950  Patient MRN: [id number]     Primary Oncologist: Misha Reza MD  Referring Provider: Yvrose Fitzpatrick MD     Date of Service: 12/3/2020      Chief Complaint:   Chief Complaint   Patient presents with    Follow-up     He came in for follow-up visit. Patient Active Problem List:     DM (diabetes mellitus) ([address]     BPH (benign prostatic hyperplasia)     Polycythemia     DVT (deep venous thrombosis)-R leg     Diarrhea     Pre-syncope     Type 2 diabetes mellitus (HCC)     Hypertension, essential     Hemochromatosis     Tinnitus     Major depressive disorder     Benign prostatic hyperplasia     History of pulmonary embolism     Mantoux: positive     Other specified glaucoma     Chronic fatigue     Functional diarrhea     HPI:   Malinda Celeste is a pleasant 78-year-old male patient who had bilateral pulmonary embolism and right lower extremity DVT in 2012. He had arthroscopic left knee surgery in 2012 and a history of spermatocele surgery in 2012 by Dr. Clark Gibbs. These two surgeries resulted in inactivity. He believed that this was the trigger factor for his DVT and PE. He denied any travel history or prior history of blood clots. He had an episode of chest pain and dyspnea on 2012, and went for a CT of the chest at [facility], showing prominent bilateral pulmonary embolism. Venous duplex study of the right lower extremity, due to the leg pain and swelling, showed DVT. He was then hospitalized for further management and started on anticoagulation. He had a Günther Tulip filter placement on 2012, and initially was offered thrombolytic treatment, which he refused. He was in ICU for four days, though he denied any history of hypotension  Blood test showed negative JAK2 study. D-dimer was less than 200. He had heterozygous H63D hemochromatosis.   Venous Doppler study in 2012 showed no evidence for DVT. He was seen by Dr. Norberto Gabriel who attempted to remove the IVC filter though due to the possibility of causing more problems by removal of the IVC filter, the procedure was cancelled. He eventually stopped his coumadin. Iron study on February 20, 2013 showed ferritin 78, iron 84, TIBC 348, transferrin saturation 24. White cell count was 5.5, hemoglobin 16.1, hematocrit 47.4, and platelet 883. Blood test on August 21, 2013 showed ferritin at 108, iron 169, iron-binding 327, transferrin saturation 35 percent. White cell count was 11.7, hemoglobin 16.6, hematocrit 47.4, platelet 058. He had a kidney stone removed in July 2013 by Dr. Kendra Cadet. Colonoscopy in September 2012 by Dr. Watson Nazario showed no polyps. He was in the hospital in September 2014. CT chest showed lung nodule and no PE. Blood tests in November 2014 showed normal CBCD. Iron was 97, TIBC 337, ferritin 67. CT chest in January 2015 showed stable lung nodule. Blood test in November 2015 showed normal CBC. Ferritin was 48. Reportedly, CAT scan of the chest in early 2016 revealed stable lung nodule and Dr. Blayne Solo plans to have a followup CAT scan in 18 months. Blood test in November 2016 revealed stable CBC, with white cell 6.3, hemoglobin 14.1, hematocrit 42.6, platelet 849. Ferritin was 78. He had colonoscopy at the age of 71 in 200. Follow-up colonoscopy in 10 years was recommended. He had skin cancer removed from the right ear by Dr. Diego Arvizu and lesion to the left temple was frozen. He has been taking vitamin B3 500 mg twice a day prescribed by Dr. Diego Arvizu. CT of the chest in October 2017 revealed stable upper lobe nodule compatible with benign granuloma. No further follow up was necessary. Labs in November 2019 were reviewed. He does not need any therapeutic phlebotomy. On December 2, 2020 he came in for follow-up visit. CBC in November 2020 was grossly unremarkable. Ferritin was 50.   He has been having watery diarrhea since January 2020. He had colonoscopy in February and May 2020. He had upper endoscopic study in November 2020. Reportedly he has gastritis. Small bowel follow-through study in March 2020 was unremarkable. C. difficile stool ova and parasite in January 2020 were negative. He was told that he has small bowel small intestine bacterial overgrowth. He is taking probiotic. At one time he was  on antibiotics. He denied any weight loss though he feels tired. He denied any wheezing or flushing. In October the diarrhea has improved but he still has intermittent watery stool. He will follow up with Dr. Andrew Godwin on December 18, 2020. I discussed with him about potential Octreoscan. He will discuss with GI first before considering Octreoscan. He may call me after the discussion with GI. We discussed about healthy diet and lifestyle. He denies signs and symptoms to suggest recurrent blood clot. He has chronic right lower extremity swelling and I recommend to wear compression stocking. He denies any nausea or vomiting. No acute pain. No chest pain, shortness of breath or palpitation. No fever or chills. No flushing or wheezing. PAST MEDICAL HISTORY:  Polycythemia in 1989, hypertension, diabetes, arthroscopic left knee surgery in April of 2012, spermatocele surgery in June of 2012, DVT and pulmonary embolism in July of 2012. He was diagnosed with skin cancer in his right ear, which was removed by Dr. Maggy Linton. FAMILY HISTORY:  Mother had kidney tumor, father had heart disease. SOCIAL HISTORY:  He denied any history of smoking or alcohol. He is  and has four children. He is retired. One of his sons was diagnosed with aortic stenosis and has a history of stroke. LABORATORY STUDIES:  February 13, 2013:  Ferritin was 78, iron 84, TIBC 348, transferrin saturation 24 percent, WBC 5.5, hemoglobin 16.1, and platelet 211,306. Review of Systems:   \"Per interval history; otherwise 10 point ROS is negative. \"     Vital Signs:  BP (!) 145/93 (Site: Right Upper Arm, Position: Sitting, Cuff Size: Medium Adult)   Pulse 103   Temp 96.9 °F (36.1 °C) (Infrared)   Resp 16   Ht 6' (1.829 m)   Wt 231 lb 9.6 oz (105.1 kg)   SpO2 95%   BMI 31.41 kg/m²     Physical Exam:  CONSTITUTIONAL: awake, alert, cooperative, no apparent distress   EYES: pupils equal, round and reactive to light, sclera clear and conjunctiva normal  ENT: Normocephalic, without obvious abnormality, atraumatic  NECK: supple, symmetrical, no jugular venous distension and no carotid bruits   HEMATOLOGIC/LYMPHATIC: no cervical, supraclavicular or axillary lymphadenopathy   LUNGS: no increased work of breathing and clear to auscultation   CARDIOVASCULAR: regular rate and rhythm, normal S1 and S2, no murmur noted  ABDOMEN: normal bowel sounds x 4, soft, non-distended, non-tender, no masses palpated, no hepatosplenomegaly   MUSCULOSKELETAL: full range of motion noted, tone is normal  NEUROLOGIC: awake, alert, oriented to name, place and time. Motor skills grossly intact. SKIN: Normal skin color, texture, turgor and no jaundice. appears intact   EXTREMITIES: no LE edema. No cyanosis. Homans signs were negative.     Labs:  Hematology:  Lab Results   Component Value Date    WBC 7.4 11/19/2020    RBC 5.14 11/19/2020    HGB 16.4 11/19/2020    HCT 47.9 11/19/2020    MCV 93.2 11/19/2020    MCH 31.9 (H) 11/19/2020    MCHC 34.2 11/19/2020    RDW 14.6 11/19/2020     11/19/2020    MPV 8.5 11/19/2020    SEGSPCT 61.7 11/19/2020    EOSRELPCT 1.4 11/19/2020    BASOPCT 0.4 11/19/2020    LYMPHOPCT 27.7 11/19/2020    MONOPCT 8.8 (H) 11/19/2020    SEGSABS 4.6 11/19/2020    EOSABS 0.1 11/19/2020    BASOSABS 0.0 11/19/2020    LYMPHSABS 2.0 11/19/2020    MONOSABS 0.7 11/19/2020    DIFFTYPE AUTOMATED DIFFERENTIAL 11/19/2020     Chemistry:  Lab Results   Component Value Date     06/02/2020    K 4.6 06/02/2020     06/02/2020 CO2 25 06/02/2020    BUN 13 06/02/2020    CREATININE 0.9 06/02/2020    GLUCOSE 192 (H) 06/02/2020    CALCIUM 9.7 06/02/2020    PROT 6.7 06/02/2020    LABALBU 4.6 06/02/2020    BILITOT 0.5 06/02/2020    ALKPHOS 56 06/02/2020    AST 12 (L) 06/02/2020    ALT 13 06/02/2020    LABGLOM >60 06/02/2020    GFRAA >60 06/02/2020    AGRATIO 2.2 06/02/2020    GLOB 2.1 06/02/2020    POCGLU 248 (H) 08/23/2014     Lab Results   Component Value Date    HOMOCYSTEINE 8.8 07/31/2012     Immunology:  Lab Results   Component Value Date    PROT 6.7 06/02/2020     Coagulation Panel:  Lab Results   Component Value Date    PROTIME 10.3 08/22/2014    INR 0.95 08/22/2014    APTT 25.5 08/22/2014    DDIMER <200 12/27/2012     Observations:  PHQ-9 Total Score: 0 (12/3/2020  9:27 AM)        Assessment & Plan:    1. He had a history of right lower extremity DVT and bilateral PE, likely provoked by the prior surgery in July 2012, status post IVC filter. He was treated with anticoagulation, including Coumadin. He is currently not on  anticoagulation. He does not have any signs or symptoms to suggest recurrent blood clot. 2. History of hereditary hemochromatosis, H63D heterozygous. Labs in November 2019 were reviewed. CBC and iron study in November 2020 were reviewed. He does not need any phlebotomy. I will continue to monitor his iron study. I recommend to watch for bleeding signs. 3. He has negative JAK2. He has intermittent watery stool. Has been work-up by GI. I am willing to order Octreoscan to rule out carcinoid however he will discuss this with GI.    4. Reportedly, CAT scan of the chest in early 2015 showed stable lung nodule. Followup CAT scans in October 2017 revealed stable right upper lung nodule, consistent with benign granuloma. No followup study necessary, as per the radiologist.      5.  We discussed about healthy diet and life style. RTC in one year with follow up blood tests, including iron study.   All of his questions have been answered for today. Recent imaging and labs were reviewed and discussed with the patient.       Electronically signed by Karley Perez MD on 12/1/20 at 8:39 AM EST

## 2020-12-03 ENCOUNTER — HOSPITAL ENCOUNTER (OUTPATIENT)
Dept: INFUSION THERAPY | Age: 70
Discharge: HOME | End: 2020-12-03
Payer: MEDICARE

## 2020-12-03 ENCOUNTER — OFFICE VISIT (OUTPATIENT)
Dept: ONCOLOGY | Age: 70
End: 2020-12-03
Payer: MEDICARE

## 2020-12-03 VITALS
SYSTOLIC BLOOD PRESSURE: 145 MMHG | DIASTOLIC BLOOD PRESSURE: 93 MMHG | HEIGHT: 72 IN | RESPIRATION RATE: 16 BRPM | HEART RATE: 103 BPM | TEMPERATURE: 96.9 F | BODY MASS INDEX: 31.37 KG/M2 | WEIGHT: 231.6 LBS | OXYGEN SATURATION: 95 %

## 2020-12-03 PROCEDURE — G8417 CALC BMI ABV UP PARAM F/U: HCPCS | Performed by: INTERNAL MEDICINE

## 2020-12-03 PROCEDURE — 4040F PNEUMOC VAC/ADMIN/RCVD: CPT | Performed by: INTERNAL MEDICINE

## 2020-12-03 PROCEDURE — 99214 OFFICE O/P EST MOD 30 MIN: CPT | Performed by: INTERNAL MEDICINE

## 2020-12-03 PROCEDURE — G8427 DOCREV CUR MEDS BY ELIG CLIN: HCPCS | Performed by: INTERNAL MEDICINE

## 2020-12-03 PROCEDURE — 1036F TOBACCO NON-USER: CPT | Performed by: INTERNAL MEDICINE

## 2020-12-03 PROCEDURE — 3017F COLORECTAL CA SCREEN DOC REV: CPT | Performed by: INTERNAL MEDICINE

## 2020-12-03 PROCEDURE — G8484 FLU IMMUNIZE NO ADMIN: HCPCS | Performed by: INTERNAL MEDICINE

## 2020-12-03 PROCEDURE — 99211 OFF/OP EST MAY X REQ PHY/QHP: CPT

## 2020-12-03 PROCEDURE — 1123F ACP DISCUSS/DSCN MKR DOCD: CPT | Performed by: INTERNAL MEDICINE

## 2020-12-03 RX ORDER — KETOCONAZOLE 20 MG/G
CREAM TOPICAL
COMMUNITY
Start: 2020-10-16 | End: 2021-02-03

## 2020-12-03 RX ORDER — NEOMYCIN SULFATE 500 MG/1
TABLET ORAL
COMMUNITY
Start: 2020-11-02 | End: 2021-06-17

## 2020-12-03 RX ORDER — METRONIDAZOLE 500 MG/1
TABLET ORAL
COMMUNITY
Start: 2020-11-02 | End: 2021-02-03

## 2020-12-03 RX ORDER — BRIMONIDINE TARTRATE/TIMOLOL 0.2%-0.5%
DROPS OPHTHALMIC (EYE)
COMMUNITY
Start: 2020-12-01

## 2020-12-03 RX ORDER — NETARSUDIL AND LATANOPROST OPHTHALMIC SOLUTION, 0.02%/0.005% .2; .05 MG/ML; MG/ML
SOLUTION/ DROPS OPHTHALMIC; TOPICAL
COMMUNITY
Start: 2020-10-16 | End: 2021-02-03

## 2020-12-03 RX ORDER — PANTOPRAZOLE SODIUM 40 MG/1
TABLET, DELAYED RELEASE ORAL
COMMUNITY
Start: 2020-11-20 | End: 2021-02-03

## 2020-12-03 ASSESSMENT — PATIENT HEALTH QUESTIONNAIRE - PHQ9
SUM OF ALL RESPONSES TO PHQ9 QUESTIONS 1 & 2: 0
2. FEELING DOWN, DEPRESSED OR HOPELESS: 0
SUM OF ALL RESPONSES TO PHQ QUESTIONS 1-9: 0
1. LITTLE INTEREST OR PLEASURE IN DOING THINGS: 0

## 2020-12-18 RX ORDER — ATORVASTATIN CALCIUM 20 MG/1
TABLET, FILM COATED ORAL
Qty: 90 TABLET | Refills: 0 | Status: SHIPPED | OUTPATIENT
Start: 2020-12-18 | End: 2021-03-23 | Stop reason: SDUPTHER

## 2020-12-18 RX ORDER — DILTIAZEM HYDROCHLORIDE 300 MG/1
300 CAPSULE, COATED, EXTENDED RELEASE ORAL DAILY
Qty: 90 CAPSULE | Refills: 0 | Status: SHIPPED | OUTPATIENT
Start: 2020-12-18 | End: 2021-03-23 | Stop reason: SDUPTHER

## 2021-01-05 ENCOUNTER — TELEPHONE (OUTPATIENT)
Dept: FAMILY MEDICINE CLINIC | Age: 71
End: 2021-01-05

## 2021-01-06 RX ORDER — PIOGLITAZONEHYDROCHLORIDE 30 MG/1
30 TABLET ORAL DAILY
Qty: 90 TABLET | Refills: 0 | Status: SHIPPED | OUTPATIENT
Start: 2021-01-06 | End: 2021-03-23 | Stop reason: SDUPTHER

## 2021-02-03 ENCOUNTER — OFFICE VISIT (OUTPATIENT)
Dept: FAMILY MEDICINE CLINIC | Age: 71
End: 2021-02-03
Payer: MEDICARE

## 2021-02-03 VITALS
BODY MASS INDEX: 31.15 KG/M2 | HEIGHT: 72 IN | WEIGHT: 230 LBS | HEART RATE: 88 BPM | TEMPERATURE: 97.3 F | SYSTOLIC BLOOD PRESSURE: 122 MMHG | DIASTOLIC BLOOD PRESSURE: 84 MMHG

## 2021-02-03 DIAGNOSIS — R19.7 DIARRHEA, UNSPECIFIED TYPE: ICD-10-CM

## 2021-02-03 DIAGNOSIS — I10 HYPERTENSION, ESSENTIAL: ICD-10-CM

## 2021-02-03 DIAGNOSIS — E11.9 TYPE 2 DIABETES MELLITUS WITHOUT COMPLICATION, WITHOUT LONG-TERM CURRENT USE OF INSULIN (HCC): Primary | Chronic | ICD-10-CM

## 2021-02-03 DIAGNOSIS — E78.49 OTHER HYPERLIPIDEMIA: ICD-10-CM

## 2021-02-03 DIAGNOSIS — E11.9 TYPE 2 DIABETES MELLITUS WITHOUT COMPLICATION, WITHOUT LONG-TERM CURRENT USE OF INSULIN (HCC): Chronic | ICD-10-CM

## 2021-02-03 DIAGNOSIS — H40.9 GLAUCOMA, UNSPECIFIED GLAUCOMA TYPE, UNSPECIFIED LATERALITY: ICD-10-CM

## 2021-02-03 LAB
A/G RATIO: 2 (ref 1.1–2.2)
ALBUMIN SERPL-MCNC: 4.9 G/DL (ref 3.4–5)
ALP BLD-CCNC: 74 U/L (ref 40–129)
ALT SERPL-CCNC: 14 U/L (ref 10–40)
ANION GAP SERPL CALCULATED.3IONS-SCNC: 15 MMOL/L (ref 3–16)
AST SERPL-CCNC: 12 U/L (ref 15–37)
BILIRUB SERPL-MCNC: 0.4 MG/DL (ref 0–1)
BUN BLDV-MCNC: 14 MG/DL (ref 7–20)
CALCIUM SERPL-MCNC: 10.2 MG/DL (ref 8.3–10.6)
CHLORIDE BLD-SCNC: 102 MMOL/L (ref 99–110)
CHOLESTEROL, TOTAL: 154 MG/DL (ref 0–199)
CO2: 25 MMOL/L (ref 21–32)
CREAT SERPL-MCNC: 0.8 MG/DL (ref 0.8–1.3)
GFR AFRICAN AMERICAN: >60
GFR NON-AFRICAN AMERICAN: >60
GLOBULIN: 2.4 G/DL
GLUCOSE BLD-MCNC: 173 MG/DL (ref 70–99)
HDLC SERPL-MCNC: 55 MG/DL (ref 40–60)
LDL CHOLESTEROL CALCULATED: 77 MG/DL
POTASSIUM SERPL-SCNC: 4.3 MMOL/L (ref 3.5–5.1)
SODIUM BLD-SCNC: 142 MMOL/L (ref 136–145)
TOTAL PROTEIN: 7.3 G/DL (ref 6.4–8.2)
TRIGL SERPL-MCNC: 110 MG/DL (ref 0–150)
VLDLC SERPL CALC-MCNC: 22 MG/DL

## 2021-02-03 PROCEDURE — 2022F DILAT RTA XM EVC RTNOPTHY: CPT | Performed by: FAMILY MEDICINE

## 2021-02-03 PROCEDURE — 3046F HEMOGLOBIN A1C LEVEL >9.0%: CPT | Performed by: FAMILY MEDICINE

## 2021-02-03 PROCEDURE — G8427 DOCREV CUR MEDS BY ELIG CLIN: HCPCS | Performed by: FAMILY MEDICINE

## 2021-02-03 PROCEDURE — 3017F COLORECTAL CA SCREEN DOC REV: CPT | Performed by: FAMILY MEDICINE

## 2021-02-03 PROCEDURE — 4040F PNEUMOC VAC/ADMIN/RCVD: CPT | Performed by: FAMILY MEDICINE

## 2021-02-03 PROCEDURE — G8417 CALC BMI ABV UP PARAM F/U: HCPCS | Performed by: FAMILY MEDICINE

## 2021-02-03 PROCEDURE — 1036F TOBACCO NON-USER: CPT | Performed by: FAMILY MEDICINE

## 2021-02-03 PROCEDURE — G8484 FLU IMMUNIZE NO ADMIN: HCPCS | Performed by: FAMILY MEDICINE

## 2021-02-03 PROCEDURE — 1123F ACP DISCUSS/DSCN MKR DOCD: CPT | Performed by: FAMILY MEDICINE

## 2021-02-03 PROCEDURE — 99214 OFFICE O/P EST MOD 30 MIN: CPT | Performed by: FAMILY MEDICINE

## 2021-02-03 RX ORDER — DIPHENOXYLATE HYDROCHLORIDE AND ATROPINE SULFATE 2.5; .025 MG/1; MG/1
1 TABLET ORAL
COMMUNITY
End: 2021-10-18

## 2021-02-03 ASSESSMENT — ENCOUNTER SYMPTOMS
VOMITING: 0
NAUSEA: 0
ABDOMINAL PAIN: 0
EYE PAIN: 0
DIARRHEA: 1
SHORTNESS OF BREATH: 0
CHEST TIGHTNESS: 0
WHEEZING: 0
TROUBLE SWALLOWING: 0
BLOOD IN STOOL: 0

## 2021-02-03 NOTE — PROGRESS NOTES
2/3/2021    Romaine Reveles    Chief Complaint   Patient presents with    Other     4 month    Other     pt reports no concerns       HPI  History was obtained from the patient. Collins Herzog is a 69 y.o. male who presents today with routine follow-up on diabetes mellitus type 2, hypertension, hyperlipidemia, and history of chronic diarrhea. Still dealing with glaucoma is getting a second opinion on appropriate treatment for same. Has had extensive evaluation for his chronic diarrhea they have decided is probably a form of IBS and nothing more elaborate. It is doing better at this point home blood sugars have been reasonable his last A1c was 7.2% in October. He is trying to stay active meds are tolerated pressure today is reasonable. Mood is good    REVIEW OF SYMPTOMS    Review of Systems   Constitutional: Negative for activity change and fatigue. HENT: Negative for congestion, hearing loss, mouth sores and trouble swallowing. Eyes: Negative for pain and visual disturbance. Patient with history of glaucoma   Respiratory: Negative for chest tightness, shortness of breath and wheezing. Cardiovascular: Negative for chest pain and palpitations. Gastrointestinal: Positive for diarrhea. Negative for abdominal pain, blood in stool, nausea and vomiting. Endocrine: Negative for cold intolerance, polydipsia and polyuria. Genitourinary: Negative for dysuria, frequency and urgency. Musculoskeletal: Negative for arthralgias, gait problem and neck stiffness. Skin: Negative for rash. Allergic/Immunologic: Negative for environmental allergies. Neurological: Negative for dizziness, seizures, speech difficulty and weakness. Hematological: Does not bruise/bleed easily. Psychiatric/Behavioral: Negative for agitation, confusion, hallucinations, self-injury and suicidal ideas.        PAST MEDICAL HISTORY  Past Medical History:   Diagnosis Date    Benign prostatic hyperplasia     DVT (deep venous thrombosis) ([facility] [address]     Hemochromatosis     History of pulmonary embolism     Hyperlipidemia     Hypertension, essential     Kidney stone     in er for kidney stones(1/14/13), had Dillyndon, saw Dr Mary Bui, did pass one\"    Major depressive disorder     Mantoux: positive     Polycythemia vera(238.4) dx late 1980s    \"per wife-(1/18/13) \"according to Dr Carlton Dash he does not have this but he does have high iron\"    Pulmonary embolism [facility])     old chart gives hx brant PE with right lower DVT 7/2012(pc)(had scope left knee 4/2012)- had Filter placement done 7/31/2012    Tinnitus     Type 2 diabetes mellitus ([facility] [address]     dx 2003       FAMILY HISTORY  Family History   Problem Relation Age of Onset    Arthritis Mother     Cancer Mother         kidney cancer    Miscarriages / Stillbirths Mother     Stroke Mother     Heart Disease Father         MI    High Cholesterol Father     Kidney Disease Brother     Heart Disease Son     Stroke Son         age 32       SOCIAL HISTORY  Social History     Socioeconomic History    Marital status:      Spouse name: None    Number of children: None    Years of education: None    Highest education level: None   Occupational History    None   Social Needs    Financial resource strain: None    Food insecurity     Worry: None     Inability: None    Transportation needs     Medical: None     Non-medical: None   Tobacco Use    Smoking status: Never Smoker    Smokeless tobacco: Never Used   Substance and Sexual Activity    Alcohol use: No    Drug use: No    Sexual activity: None   Lifestyle    Physical activity     Days per week: None     Minutes per session: None    Stress: None   Relationships    Social connections     Talks on phone: None     Gets together: None     Attends Synagogue service: None     Active member of club or organization: None     Attends meetings of clubs or organizations: None     Relationship status: None    Intimate partner violence     Fear of current or ex partner: None     Emotionally abused: None     Physically abused: None     Forced sexual activity: None   Other Topics Concern    None   Social History Narrative    None        SURGICAL HISTORY  Past Surgical History:   Procedure Laterality Date    COLONOSCOPY      KNEE ARTHROSCOPY Right 04/2012    LITHOTRIPSY      SKIN BIOPSY  June 2012    Spermatocelectomy    VASECTOMY  1980    VENA CAVA FILTER PLACEMENT                   CURRENT MEDICATIONS  Current Outpatient Medications   Medication Sig Dispense Refill    diphenoxylate-atropine (LOMOTIL) 2.5-0.025 MG per tablet Take 1 tablet by mouth. 1 to 2 times daily prn      pioglitazone (ACTOS) 30 MG tablet Take 1 tablet by mouth daily 90 tablet 0    dilTIAZem (CARTIA XT) 300 MG extended release capsule Take 1 capsule by mouth daily 90 capsule 0    dapagliflozin (FARXIGA) 5 MG tablet TAKE 1 TABLET BY MOUTH ONE TIME A DAY 30 tablet 2    atorvastatin (LIPITOR) 20 MG tablet TAKE 1 TABLET BY MOUTH ONE TIME A DAY 90 tablet 0    neomycin (MYCIFRADIN) 500 MG tablet TAKE 1 TABLET BY MOUTH TWO TIMES A DAY AS DIRECTED      COMBIGAN 0.2-0.5 % ophthalmic solution       LATANOPROST OP Apply to eye      Probiotic Product (PROBIOTIC DAILY PO) Take by mouth      metFORMIN (GLUCOPHAGE) 500 MG tablet TAKE 2 TABS BY MOUTH IN THE MORNING, 1 TAB AT NOON, AND 2 TABS IN THE EVENING WITH MEALS 450 tablet 1    lisinopril (PRINIVIL;ZESTRIL) 5 MG tablet TAKE 1 TABLET BY MOUTH ONE TIME A DAY 90 tablet 1    niacinamide 500 MG tablet Take 500 mg by mouth 2 times daily (with meals)      PANTOTHENIC ACID PO Take 500 mg by mouth daily.  Multiple Vitamins-Minerals (OCUVITE) TABS oral tablet Take 1 tablet by mouth daily.  vitamin D3 (CHOLECALCIFEROL) 400 units TABS tablet Take by mouth daily        No current facility-administered medications for this visit.         ALLERGIES  Allergies   Allergen Reactions    Bee Pollen     Pollen Extract     Vicodin

## 2021-02-04 LAB
ESTIMATED AVERAGE GLUCOSE: 165.7 MG/DL
HBA1C MFR BLD: 7.4 %

## 2021-02-10 DIAGNOSIS — E11.9 TYPE 2 DIABETES MELLITUS WITHOUT COMPLICATION, WITHOUT LONG-TERM CURRENT USE OF INSULIN (HCC): ICD-10-CM

## 2021-03-23 RX ORDER — PIOGLITAZONEHYDROCHLORIDE 30 MG/1
30 TABLET ORAL DAILY
Qty: 90 TABLET | Refills: 0 | Status: SHIPPED | OUTPATIENT
Start: 2021-03-23 | End: 2021-06-17 | Stop reason: SDUPTHER

## 2021-03-23 RX ORDER — ATORVASTATIN CALCIUM 20 MG/1
TABLET, FILM COATED ORAL
Qty: 90 TABLET | Refills: 0 | Status: SHIPPED | OUTPATIENT
Start: 2021-03-23 | End: 2021-06-17 | Stop reason: SDUPTHER

## 2021-03-23 RX ORDER — DILTIAZEM HYDROCHLORIDE 300 MG/1
300 CAPSULE, COATED, EXTENDED RELEASE ORAL DAILY
Qty: 90 CAPSULE | Refills: 0 | Status: SHIPPED | OUTPATIENT
Start: 2021-03-23 | End: 2021-06-17 | Stop reason: SDUPTHER

## 2021-05-03 RX ORDER — LISINOPRIL 5 MG/1
TABLET ORAL
Qty: 90 TABLET | Refills: 1 | Status: SHIPPED | OUTPATIENT
Start: 2021-05-03 | End: 2021-10-28

## 2021-06-05 ENCOUNTER — HOSPITAL ENCOUNTER (EMERGENCY)
Age: 71
Discharge: HOME | End: 2021-06-05
Attending: EMERGENCY MEDICINE
Payer: MEDICARE

## 2021-06-05 ENCOUNTER — APPOINTMENT (OUTPATIENT)
Dept: GENERAL RADIOLOGY | Age: 71
End: 2021-06-05
Payer: MEDICARE

## 2021-06-05 VITALS
SYSTOLIC BLOOD PRESSURE: 138 MMHG | BODY MASS INDEX: 31.83 KG/M2 | RESPIRATION RATE: 16 BRPM | WEIGHT: 235 LBS | HEART RATE: 85 BPM | DIASTOLIC BLOOD PRESSURE: 77 MMHG | TEMPERATURE: 97 F | HEIGHT: 72 IN | OXYGEN SATURATION: 96 %

## 2021-06-05 DIAGNOSIS — R07.81 RIB PAIN: Primary | ICD-10-CM

## 2021-06-05 PROCEDURE — 71101 X-RAY EXAM UNILAT RIBS/CHEST: CPT

## 2021-06-05 PROCEDURE — 99282 EMERGENCY DEPT VISIT SF MDM: CPT

## 2021-06-05 ASSESSMENT — PAIN SCALES - GENERAL: PAINLEVEL_OUTOF10: 1

## 2021-06-05 ASSESSMENT — ENCOUNTER SYMPTOMS
EYE DISCHARGE: 0
ABDOMINAL PAIN: 0
COUGH: 0
NAUSEA: 0
VOMITING: 0
SHORTNESS OF BREATH: 0
SORE THROAT: 0
BACK PAIN: 0
EYE PAIN: 0

## 2021-06-05 ASSESSMENT — PAIN DESCRIPTION - LOCATION: LOCATION: RIB CAGE

## 2021-06-05 ASSESSMENT — PAIN DESCRIPTION - ORIENTATION: ORIENTATION: RIGHT

## 2021-06-05 NOTE — ED PROVIDER NOTES
for dizziness, facial asymmetry, light-headedness, numbness and headaches. Psychiatric/Behavioral: Negative for confusion. Except as noted above the remainder of the review of systems was reviewed and negative. PAST MEDICAL HISTORY     Past Medical History:   Diagnosis Date    Benign prostatic hyperplasia     DVT (deep venous thrombosis) (HCC)     Hemochromatosis     History of pulmonary embolism     Hyperlipidemia     Hypertension, essential     Kidney stone     in er for kidney stones(1/14/13), had Dilaudid, saw Dr Goetz Early, did pass one\"    Major depressive disorder     Mantoux: positive     Polycythemia vera(238.4) dx late 1980s    \"per wife-(1/18/13) \"according to Dr Dwayne Laboy he does not have this but he does have high iron\"    Pulmonary embolism [facility])     old chart gives hx brant PE with right lower DVT 7/2012(pc)(had scope left knee 4/2012)- had Filter placement done 7/31/2012    Tinnitus     Type 2 diabetes mellitus ([facility] [address]     dx 2003       Prior to Admission medications    Medication Sig Start Date End Date Taking? Authorizing Provider   lisinopril (PRINIVIL;ZESTRIL) 5 MG tablet TAKE 1 TABLET BY MOUTH ONE TIME A DAY 5/3/21   Bree Bennett MD   metFORMIN (GLUCOPHAGE) 500 MG tablet TAKE 2 TABS BY MOUTH IN THE MORNING, 1 TAB AT NOON, AND 2 TABS IN THE EVENING WITH MEALS 5/3/21   Bree Bennett MD   atorvastatin (LIPITOR) 20 MG tablet TAKE 1 TABLET BY MOUTH ONE TIME A DAY 3/23/21   Bree Bennett MD   pioglitazone (ACTOS) 30 MG tablet Take 1 tablet by mouth daily 3/23/21   Bree Bennett MD   dilTIAZem (CARTIA XT) 300 MG extended release capsule Take 1 capsule by mouth daily 3/23/21   Bree Bennett MD   dapagliflozin Vesna Hole) 10 MG tablet Take 1 tablet by mouth every morning 2/10/21   Bree Bennett MD   diphenoxylate-atropine (LOMOTIL) 2.5-0.025 MG per tablet Take 1 tablet by mouth.  1 to 2 times daily prn    Historical Provider, MD   neomycin LATANOPROST OP    Apply to eye    LISINOPRIL (PRINIVIL;ZESTRIL) 5 MG TABLET    TAKE 1 TABLET BY MOUTH ONE TIME A DAY    METFORMIN (GLUCOPHAGE) 500 MG TABLET    TAKE 2 TABS BY MOUTH IN THE MORNING, 1 TAB AT NOON, AND 2 TABS IN THE EVENING WITH MEALS    MULTIPLE VITAMINS-MINERALS (OCUVITE) TABS ORAL TABLET    Take 1 tablet by mouth daily. NEOMYCIN (MYCIFRADIN) 500 MG TABLET    TAKE 1 TABLET BY MOUTH TWO TIMES A DAY AS DIRECTED    NIACINAMIDE 500 MG TABLET    Take 500 mg by mouth 2 times daily (with meals)    PANTOTHENIC ACID PO    Take 500 mg by mouth daily.     PIOGLITAZONE (ACTOS) 30 MG TABLET    Take 1 tablet by mouth daily    PROBIOTIC PRODUCT (PROBIOTIC DAILY PO)    Take by mouth    VITAMIN D3 (CHOLECALCIFEROL) 400 UNITS TABS TABLET    Take by mouth daily        ALLERGIES     Bee pollen, Pollen extract, and Vicodin [hydrocodone-acetaminophen]    FAMILY HISTORY       Family History   Problem Relation Age of Onset    Arthritis Mother     Cancer Mother         kidney cancer    Miscarriages / Stillbirths Mother     Stroke Mother     Heart Disease Father         MI    High Cholesterol Father     Kidney Disease Brother     Heart Disease Son     Stroke Son         age 32          SOCIAL HISTORY       Social History     Socioeconomic History    Marital status:      Spouse name: None    Number of children: None    Years of education: None    Highest education level: None   Occupational History    None   Tobacco Use    Smoking status: Never Smoker    Smokeless tobacco: Never Used   Substance and Sexual Activity    Alcohol use: No    Drug use: No    Sexual activity: None   Other Topics Concern    None   Social History Narrative    None     Social Determinants of Health     Financial Resource Strain:     Difficulty of Paying Living Expenses:    Food Insecurity:     Worried About Running Out of Food in the Last Year:     Ran Out of Food in the Last Year:    Transportation Needs:     Lack of Transportation (Medical):  Lack of Transportation (Non-Medical):    Physical Activity:     Days of Exercise per Week:     Minutes of Exercise per Session:    Stress:     Feeling of Stress :    Social Connections:     Frequency of Communication with Friends and Family:     Frequency of Social Gatherings with Friends and Family:     Attends Mosque Services:     Active Member of Clubs or Organizations:     Attends Club or Organization Meetings:     Marital Status:    Intimate Partner Violence:     Fear of Current or Ex-Partner:     Emotionally Abused:     Physically Abused:     Sexually Abused:        SCREENINGS               PHYSICAL EXAM    (up to 7 for level 4, 8 or more for level 5)     ED Triage Vitals [06/05/21 1400]   BP Temp Temp Source Pulse Resp SpO2 Height Weight   -- 97 °F (36.1 °C) Infrared 85 16 96 % 6' (1.829 m) 235 lb (106.6 kg)       Physical Exam  Vitals reviewed. Constitutional:       Appearance: He is not ill-appearing or toxic-appearing. HENT:      Head: Normocephalic and atraumatic. Nose: Nose normal. No congestion or rhinorrhea. Mouth/Throat:      Mouth: Mucous membranes are moist.      Pharynx: No oropharyngeal exudate or posterior oropharyngeal erythema. Eyes:      General:         Right eye: No discharge. Left eye: No discharge. Extraocular Movements: Extraocular movements intact. Pupils: Pupils are equal, round, and reactive to light. Cardiovascular:      Rate and Rhythm: Normal rate. Heart sounds: No friction rub. No gallop. Pulmonary:      Effort: Pulmonary effort is normal. No respiratory distress. Comments: Right rib tenderness; no deformity noted  B/l breath sounds  Chest:      Chest wall: Tenderness present. Abdominal:      Palpations: Abdomen is soft. Tenderness: There is no abdominal tenderness. There is no guarding.       Comments: Abdomen soft, non-tender, non-peritoneal  No guarding on abdominal exam Musculoskeletal:         General: No tenderness. Normal range of motion. Cervical back: Normal range of motion and neck supple. No tenderness. Right lower leg: No edema. Left lower leg: No edema. Lymphadenopathy:      Cervical: No cervical adenopathy. Skin:     General: Skin is warm. Capillary Refill: Capillary refill takes less than 2 seconds. Findings: No erythema, lesion or rash. Neurological:      General: No focal deficit present. Mental Status: He is alert and oriented to person, place, and time. DIAGNOSTIC RESULTS     Labs Reviewed - No data to display       RADIOLOGY:     Non-plain film images such as CT, Ultrasound and MRI are read by the radiologist. Plain radiographic images are visualized and preliminarily interpreted by the emergency physician. Interpretation per the Radiologist below, if available at the time of this note:    XR RIBS RIGHT INCLUDE CHEST (MIN 3 VIEWS)   Final Result   No convincing radiographic evidence of displaced right rib fracture is seen. Low lung volumes with scarring/atelectatic changes seen at the left lung base   without confluent airspace opacity, pleural effusion or pneumothorax seen. RECOMMENDATION:   If there is a clinical concern for occult fracture, consider follow-up   examination in 7-10 days. ED BEDSIDE ULTRASOUND:   Performed by ED Physician Lucrezia Cockayne, MD       LABS:  Labs Reviewed - No data to display    All other labs were within normal range or not returned as of this dictation. EMERGENCY DEPARTMENT COURSE and DIFFERENTIAL DIAGNOSIS/MDM:   Vitals:    Vitals:    06/05/21 1400   Pulse: 85   Resp: 16   Temp: 97 °F (36.1 °C)   TempSrc: Infrared   SpO2: 96%   Weight: 235 lb (106.6 kg)   Height: 6' (1.829 m)           MDM  Number of Diagnoses or Management Options  Rib pain  Diagnosis management comments: 69-year-old male presents with right-sided rib pain.   Mechanical fall several instructed to read the package inserts with any medication that was prescribed. Major potential reactions and medication interactions were discussed. The Patient understands that there are numerous possible adverse reactions not covered. The patient was also instructed to arrange follow-up with his or her primary care provider for review of any pending labwork or incidental findings on any radiology results that were obtained. All efforts were made to discuss any incidental findings that require further monitoring. Controlled Substances Monitoring:     No flowsheet data found.     (Please note that portions of this note were completed with a voice recognition program.  Efforts were made to edit the dictations but occasionally words are mis-transcribed.)    Ck Grande MD (electronically signed)  Attending Emergency Physician           Ck Grande MD  06/05/21 8788

## 2021-06-17 ENCOUNTER — OFFICE VISIT (OUTPATIENT)
Dept: FAMILY MEDICINE CLINIC | Age: 71
End: 2021-06-17
Payer: MEDICARE

## 2021-06-17 VITALS
HEIGHT: 72 IN | HEART RATE: 96 BPM | OXYGEN SATURATION: 97 % | BODY MASS INDEX: 30.88 KG/M2 | SYSTOLIC BLOOD PRESSURE: 114 MMHG | WEIGHT: 228 LBS | DIASTOLIC BLOOD PRESSURE: 84 MMHG

## 2021-06-17 DIAGNOSIS — R19.7 DIARRHEA, UNSPECIFIED TYPE: ICD-10-CM

## 2021-06-17 DIAGNOSIS — E11.9 TYPE 2 DIABETES MELLITUS WITHOUT COMPLICATION, WITHOUT LONG-TERM CURRENT USE OF INSULIN (HCC): ICD-10-CM

## 2021-06-17 DIAGNOSIS — E11.9 TYPE 2 DIABETES MELLITUS WITHOUT COMPLICATION, WITHOUT LONG-TERM CURRENT USE OF INSULIN (HCC): Primary | ICD-10-CM

## 2021-06-17 DIAGNOSIS — E78.49 OTHER HYPERLIPIDEMIA: ICD-10-CM

## 2021-06-17 DIAGNOSIS — I10 HYPERTENSION, ESSENTIAL: ICD-10-CM

## 2021-06-17 DIAGNOSIS — H40.89 OTHER SPECIFIED GLAUCOMA, UNSPECIFIED LATERALITY: ICD-10-CM

## 2021-06-17 DIAGNOSIS — K40.90 LEFT INGUINAL HERNIA: ICD-10-CM

## 2021-06-17 DIAGNOSIS — Z11.59 NEED FOR HEPATITIS C SCREENING TEST: ICD-10-CM

## 2021-06-17 LAB
A/G RATIO: 2.2 (ref 1.1–2.2)
ALBUMIN SERPL-MCNC: 4.7 G/DL (ref 3.4–5)
ALP BLD-CCNC: 63 U/L (ref 40–129)
ALT SERPL-CCNC: 15 U/L (ref 10–40)
ANION GAP SERPL CALCULATED.3IONS-SCNC: 13 MMOL/L (ref 3–16)
AST SERPL-CCNC: 11 U/L (ref 15–37)
BASOPHILS ABSOLUTE: 0 K/UL (ref 0–0.2)
BASOPHILS RELATIVE PERCENT: 0.6 %
BILIRUB SERPL-MCNC: 0.6 MG/DL (ref 0–1)
BUN BLDV-MCNC: 15 MG/DL (ref 7–20)
CALCIUM SERPL-MCNC: 9.7 MG/DL (ref 8.3–10.6)
CHLORIDE BLD-SCNC: 105 MMOL/L (ref 99–110)
CO2: 25 MMOL/L (ref 21–32)
CREAT SERPL-MCNC: 0.9 MG/DL (ref 0.8–1.3)
EOSINOPHILS ABSOLUTE: 0.1 K/UL (ref 0–0.6)
EOSINOPHILS RELATIVE PERCENT: 1.8 %
GFR AFRICAN AMERICAN: >60
GFR NON-AFRICAN AMERICAN: >60
GLOBULIN: 2.1 G/DL
GLUCOSE BLD-MCNC: 180 MG/DL (ref 70–99)
HCT VFR BLD CALC: 48.3 % (ref 40.5–52.5)
HEMOGLOBIN: 16.2 G/DL (ref 13.5–17.5)
HEPATITIS C ANTIBODY INTERPRETATION: NORMAL
LYMPHOCYTES ABSOLUTE: 1.6 K/UL (ref 1–5.1)
LYMPHOCYTES RELATIVE PERCENT: 25.9 %
MCH RBC QN AUTO: 31.9 PG (ref 26–34)
MCHC RBC AUTO-ENTMCNC: 33.6 G/DL (ref 31–36)
MCV RBC AUTO: 95.2 FL (ref 80–100)
MONOCYTES ABSOLUTE: 0.5 K/UL (ref 0–1.3)
MONOCYTES RELATIVE PERCENT: 8.6 %
NEUTROPHILS ABSOLUTE: 3.8 K/UL (ref 1.7–7.7)
NEUTROPHILS RELATIVE PERCENT: 63.1 %
PDW BLD-RTO: 14.4 % (ref 12.4–15.4)
PLATELET # BLD: 234 K/UL (ref 135–450)
PMV BLD AUTO: 7.6 FL (ref 5–10.5)
POTASSIUM SERPL-SCNC: 4.4 MMOL/L (ref 3.5–5.1)
RBC # BLD: 5.07 M/UL (ref 4.2–5.9)
SODIUM BLD-SCNC: 143 MMOL/L (ref 136–145)
TOTAL PROTEIN: 6.8 G/DL (ref 6.4–8.2)
WBC # BLD: 6.1 K/UL (ref 4–11)

## 2021-06-17 PROCEDURE — G8417 CALC BMI ABV UP PARAM F/U: HCPCS | Performed by: PHYSICIAN ASSISTANT

## 2021-06-17 PROCEDURE — 3017F COLORECTAL CA SCREEN DOC REV: CPT | Performed by: PHYSICIAN ASSISTANT

## 2021-06-17 PROCEDURE — 99214 OFFICE O/P EST MOD 30 MIN: CPT | Performed by: PHYSICIAN ASSISTANT

## 2021-06-17 PROCEDURE — 1036F TOBACCO NON-USER: CPT | Performed by: PHYSICIAN ASSISTANT

## 2021-06-17 PROCEDURE — 2022F DILAT RTA XM EVC RTNOPTHY: CPT | Performed by: PHYSICIAN ASSISTANT

## 2021-06-17 PROCEDURE — 1123F ACP DISCUSS/DSCN MKR DOCD: CPT | Performed by: PHYSICIAN ASSISTANT

## 2021-06-17 PROCEDURE — 4040F PNEUMOC VAC/ADMIN/RCVD: CPT | Performed by: PHYSICIAN ASSISTANT

## 2021-06-17 PROCEDURE — G8427 DOCREV CUR MEDS BY ELIG CLIN: HCPCS | Performed by: PHYSICIAN ASSISTANT

## 2021-06-17 PROCEDURE — 3051F HG A1C>EQUAL 7.0%<8.0%: CPT | Performed by: PHYSICIAN ASSISTANT

## 2021-06-17 RX ORDER — DILTIAZEM HYDROCHLORIDE 300 MG/1
300 CAPSULE, COATED, EXTENDED RELEASE ORAL DAILY
Qty: 90 CAPSULE | Refills: 1 | Status: SHIPPED | OUTPATIENT
Start: 2021-06-17 | End: 2021-12-10 | Stop reason: SDUPTHER

## 2021-06-17 RX ORDER — PIOGLITAZONEHYDROCHLORIDE 30 MG/1
30 TABLET ORAL DAILY
Qty: 90 TABLET | Refills: 1 | Status: SHIPPED | OUTPATIENT
Start: 2021-06-17 | End: 2021-12-10 | Stop reason: SDUPTHER

## 2021-06-17 RX ORDER — ATORVASTATIN CALCIUM 20 MG/1
TABLET, FILM COATED ORAL
Qty: 90 TABLET | Refills: 1 | Status: SHIPPED | OUTPATIENT
Start: 2021-06-17 | End: 2021-12-10 | Stop reason: SDUPTHER

## 2021-06-17 NOTE — PROGRESS NOTES
6/17/2021    Elzie Flash    Chief Complaint   Patient presents with    Other     4 month follow up    Fall     pt reports fall a few weeks ago, went to the ER to have xrays, xray normal however pt has a question about the final result of the xray, he is not sure how to interpret the finding.  Hernia     pt reports he saw dr. Lea Lockhart recently and pt was informed he has a hernia in the left side of the groin, pt was advised to get surgery by dr. Lea Lockhart       HPI  History obtained from the patient. Cain Morales is a 66 y.o. male who presents today for 4 month follow up. Fall - Patient was thoroughly reading his ED notes after his fall 6/5/21 and did not understand why \"low lung volumes with scarring/atelectatic changes seen at the left lung base\" meant. Discussed this with the patient and reassured him. Hernia - Patient has an annual skin cancer exam with Dr. James Small. Patient states that Dr. James Small told him last week that had a left inguinal hernia and needs to see a surgeon. DM Type 2 -  Compliant with Metformin TID, Actos 30 mg daily, Farxiga 10 mg daily. Patient states that after his last A1C 4 months ago, his Delphia Benoit was increased from 5 to 10 mg. Patient checks his blood glucose before lunch and states that it averages 130-140. Chronic Diarrhea - Patient states that he was evaluated by GI and, \"That was an over a year process. I had two colonoscopies. .. He finally said 'I think you have irritable bowel syndrome. '\" Patient states that he now eats more fiber and has cut back on Lomotil. He states that he used to continuously have issues with diarrhea, but now he only has diarrhea once every 6-8 weeks for about 1-2 days. HLD - Compliant with atorvastatin 20 mg. HTN - Patient is compliant with lisinopril 5 mg daily, diltiazem 30 mg ER daily. Patient denies home blood pressure monitoring. Patient denies HA, vision changes, lightheadedness.      Health Maintenance - Patient's care gaps include retinal exam, annual wellness visit, hep C screen. Patient follows with ophthalmology, Dr. Jamie Hardin. His last retinal exam 2-4-21. He also got a foot exam with Dr. Olya Kelly 10-    REVIEW OF SYMPTOMS  Review of Systems   Constitutional: Negative for chills and fever. Respiratory: Negative for cough and shortness of breath. Gastrointestinal: Negative for diarrhea and vomiting.      PAST MEDICAL HISTORY  Past Medical History:   Diagnosis Date    Benign prostatic hyperplasia     DVT (deep venous thrombosis) (HCC)     Hemochromatosis     History of pulmonary embolism     Hyperlipidemia     Hypertension, essential     Kidney stone     in er for kidney stones(1/14/13), had Dilaudid, saw Dr Elina Ochoa, did pass one\"    Major depressive disorder     Mantoux: positive     Polycythemia vera(238.4) dx late 1980s    \"per wife-(1/18/13) \"according to Dr Luis Dhaliwal he does not have this but he does have high iron\"    Pulmonary embolism [facility])     old chart gives hx brant PE with right lower DVT 7/2012(pc)(had scope left knee 4/2012)- had Filter placement done 7/31/2012    Tinnitus     Type 2 diabetes mellitus ([facility] [address]     dx 2003       FAMILY HISTORY  Family History   Problem Relation Age of Onset    Arthritis Mother     Cancer Mother         kidney cancer    Miscarriages / Stillbirths Mother     Stroke Mother     Heart Disease Father         MI    High Cholesterol Father     Kidney Disease Brother     Heart Disease Son     Stroke Son         age 32       SOCIAL HISTORY  Social History     Socioeconomic History    Marital status:      Spouse name: None    Number of children: None    Years of education: None    Highest education level: None   Occupational History    None   Tobacco Use    Smoking status: Never Smoker    Smokeless tobacco: Never Used   Substance and Sexual Activity    Alcohol use: No    Drug use: No    Sexual activity: None   Other Topics Concern    None   Social History Narrative    None Social Determinants of Health     Financial Resource Strain:     Difficulty of Paying Living Expenses:    Food Insecurity:     Worried About Running Out of Food in the Last Year:     [address] in the Last Year:    Transportation Needs:     Lack of Transportation (Medical):  Lack of Transportation (Non-Medical):    Physical Activity:     Days of Exercise per Week:     Minutes of Exercise per Session:    Stress:     Feeling of Stress :    Social Connections:     Frequency of Communication with Friends and Family:     Frequency of Social Gatherings with Friends and Family:     Attends Muslim Services:     Active Member of Clubs or Organizations:     Attends Club or Organization Meetings:     Marital Status:    Intimate Partner Violence:     Fear of Current or Ex-Partner:     Emotionally Abused:     Physically Abused:     Sexually Abused:         SURGICAL HISTORY  Past Surgical History:   Procedure Laterality Date    COLONOSCOPY      KNEE ARTHROSCOPY Right 04/2012    LITHOTRIPSY      SKIN BIOPSY  June 2012    Spermatocelectomy    VASECTOMY  1980    VENA CAVA FILTER PLACEMENT         CURRENT MEDICATIONS  Current Outpatient Medications   Medication Sig Dispense Refill    pioglitazone (ACTOS) 30 MG tablet Take 1 tablet by mouth daily 90 tablet 1    atorvastatin (LIPITOR) 20 MG tablet TAKE 1 TABLET BY MOUTH ONE TIME A DAY 90 tablet 1    dilTIAZem (CARTIA XT) 300 MG extended release capsule Take 1 capsule by mouth daily 90 capsule 1    lisinopril (PRINIVIL;ZESTRIL) 5 MG tablet TAKE 1 TABLET BY MOUTH ONE TIME A DAY 90 tablet 1    metFORMIN (GLUCOPHAGE) 500 MG tablet TAKE 2 TABS BY MOUTH IN THE MORNING, 1 TAB AT NOON, AND 2 TABS IN THE EVENING WITH MEALS 450 tablet 1    dapagliflozin (FARXIGA) 10 MG tablet Take 1 tablet by mouth every morning 30 tablet 5    diphenoxylate-atropine (LOMOTIL) 2.5-0.025 MG per tablet Take 1 tablet by mouth.  1 to 2 times daily prn      COMBIGAN 0.2-0.5 % ophthalmic solution       LATANOPROST OP Apply to eye      Probiotic Product (PROBIOTIC DAILY PO) Take by mouth      niacinamide 500 MG tablet Take 500 mg by mouth 2 times daily (with meals)      PANTOTHENIC ACID PO Take 500 mg by mouth daily.  Multiple Vitamins-Minerals (OCUVITE) TABS oral tablet Take 1 tablet by mouth daily.  vitamin D3 (CHOLECALCIFEROL) 400 units TABS tablet Take by mouth daily        No current facility-administered medications for this visit. ALLERGIES  Allergies   Allergen Reactions    Bee Pollen     Pollen Extract     Tree Extract     Vicodin [Hydrocodone-Acetaminophen] Nausea And Vomiting       RECENT LABS    Lab Results   Component Value Date    LABA1C 7.4 02/03/2021     Lab Results   Component Value Date    .7 02/03/2021       Lab Results   Component Value Date    CHOL 154 02/03/2021    CHOL 146 06/02/2020    CHOL 167 08/01/2019     Lab Results   Component Value Date    LDLCALC 77 02/03/2021    LDLCALC 68 06/02/2020    LDLCALC 83 08/01/2019       Lab Results   Component Value Date    WBC 7.4 11/19/2020    HGB 16.4 11/19/2020    HCT 47.9 11/19/2020    MCV 93.2 11/19/2020     11/19/2020       PHYSICAL EXAM  /84 (Site: Right Upper Arm, Position: Sitting, Cuff Size: Medium Adult)   Pulse 96   Ht 6' (1.829 m)   Wt 228 lb (103.4 kg)   SpO2 97%   BMI 30.92 kg/m²     Physical Exam  Exam conducted with a chaperone present. Constitutional:       Appearance: Normal appearance. HENT:      Head: Normocephalic and atraumatic. Eyes:      Comments: EOM grossly intact. Cardiovascular:      Rate and Rhythm: Normal rate and regular rhythm. Heart sounds: No murmur heard. No friction rub. No gallop. Pulmonary:      Effort: Pulmonary effort is normal.      Breath sounds: Normal breath sounds. No wheezing, rhonchi or rales. Genitourinary:     Comments: Circumcised male. No penile discharge or lesions. No scrotal swelling or discoloration. Testes descended bilaterally, smooth, no masses. Epididymis nontender. I could not palpate an inguinal hernia. Skin:     General: Skin is warm and dry. Neurological:      Mental Status: He is alert and oriented to person, place, and time. Comments: Cranial nerves II-XII grossly intact   Psychiatric:         Mood and Affect: Mood normal.         Behavior: Behavior normal.         ASSESSMENT & PLAN  1. Type 2 diabetes mellitus without complication, without long-term current use of insulin (HCC)  We will check A1c and advise based on findings. Refilled medication. - pioglitazone (ACTOS) 30 MG tablet; Take 1 tablet by mouth daily  Dispense: 90 tablet; Refill: 1  - Comprehensive Metabolic Panel; Future  - CBC Auto Differential; Future  - Hemoglobin A1C; Future    2. Hypertension, essential  Well-controlled. Refilled medication. - dilTIAZem (CARTIA XT) 300 MG extended release capsule; Take 1 capsule by mouth daily  Dispense: 90 capsule; Refill: 1    3. Left inguinal hernia  I could not palpate an inguinal hernia myself, but referred patient to general surgery for evaluation. Patient prefers to see Sharad Reilly, as his wife follows with her. - ([facility]) - Shira Ann MD, General Surgery, [facility]    4. Other hyperlipidemia  Well-controlled. Refilled medication.  - atorvastatin (LIPITOR) 20 MG tablet; TAKE 1 TABLET BY MOUTH ONE TIME A DAY  Dispense: 90 tablet; Refill: 1    5. Diarrhea, unspecified type  Significant improvement since diet changes. Well-controlled. 6. Other specified glaucoma, unspecified laterality  Patient follows with ophthalmology. 7. Need for hepatitis C screening test  Health maintenance requirement. Patient is agreeable to screening.   - Hepatitis C Antibody; Future          Return in about 4 months (around 10/17/2021).             Electronically signed by Tal Ny PA-C on 6/17/2021

## 2021-06-18 DIAGNOSIS — E11.9 TYPE 2 DIABETES MELLITUS WITHOUT COMPLICATION, WITHOUT LONG-TERM CURRENT USE OF INSULIN (HCC): Primary | ICD-10-CM

## 2021-06-18 LAB
ESTIMATED AVERAGE GLUCOSE: 165.7 MG/DL
HBA1C MFR BLD: 7.4 %

## 2021-06-18 RX ORDER — GLIPIZIDE 2.5 MG/1
2.5 TABLET, EXTENDED RELEASE ORAL DAILY
Qty: 90 TABLET | Refills: 1 | Status: SHIPPED | OUTPATIENT
Start: 2021-06-18 | End: 2021-12-10 | Stop reason: SDUPTHER

## 2021-07-01 ENCOUNTER — VIRTUAL VISIT (OUTPATIENT)
Dept: FAMILY MEDICINE CLINIC | Age: 71
End: 2021-07-01
Payer: MEDICARE

## 2021-07-01 ENCOUNTER — TELEPHONE (OUTPATIENT)
Dept: FAMILY MEDICINE CLINIC | Age: 71
End: 2021-07-01

## 2021-07-01 DIAGNOSIS — Z00.00 ROUTINE GENERAL MEDICAL EXAMINATION AT A HEALTH CARE FACILITY: Primary | ICD-10-CM

## 2021-07-01 PROCEDURE — 3017F COLORECTAL CA SCREEN DOC REV: CPT | Performed by: FAMILY MEDICINE

## 2021-07-01 PROCEDURE — 1123F ACP DISCUSS/DSCN MKR DOCD: CPT | Performed by: FAMILY MEDICINE

## 2021-07-01 PROCEDURE — G0439 PPPS, SUBSEQ VISIT: HCPCS | Performed by: FAMILY MEDICINE

## 2021-07-01 PROCEDURE — 4040F PNEUMOC VAC/ADMIN/RCVD: CPT | Performed by: FAMILY MEDICINE

## 2021-07-01 RX ORDER — M-VIT,TX,IRON,MINS/CALC/FOLIC 27MG-0.4MG
1 TABLET ORAL DAILY
COMMUNITY

## 2021-07-01 SDOH — ECONOMIC STABILITY: FOOD INSECURITY: WITHIN THE PAST 12 MONTHS, THE FOOD YOU BOUGHT JUST DIDN'T LAST AND YOU DIDN'T HAVE MONEY TO GET MORE.: NEVER TRUE

## 2021-07-01 SDOH — ECONOMIC STABILITY: FOOD INSECURITY: WITHIN THE PAST 12 MONTHS, YOU WORRIED THAT YOUR FOOD WOULD RUN OUT BEFORE YOU GOT MONEY TO BUY MORE.: NEVER TRUE

## 2021-07-01 ASSESSMENT — PATIENT HEALTH QUESTIONNAIRE - PHQ9
1. LITTLE INTEREST OR PLEASURE IN DOING THINGS: 0
SUM OF ALL RESPONSES TO PHQ QUESTIONS 1-9: 0
SUM OF ALL RESPONSES TO PHQ QUESTIONS 1-9: 0
2. FEELING DOWN, DEPRESSED OR HOPELESS: 0
SUM OF ALL RESPONSES TO PHQ QUESTIONS 1-9: 0
SUM OF ALL RESPONSES TO PHQ9 QUESTIONS 1 & 2: 0

## 2021-07-01 ASSESSMENT — LIFESTYLE VARIABLES: HOW OFTEN DO YOU HAVE A DRINK CONTAINING ALCOHOL: 0

## 2021-07-01 ASSESSMENT — SOCIAL DETERMINANTS OF HEALTH (SDOH): HOW HARD IS IT FOR YOU TO PAY FOR THE VERY BASICS LIKE FOOD, HOUSING, MEDICAL CARE, AND HEATING?: NOT HARD AT ALL

## 2021-07-01 NOTE — PROGRESS NOTES
Medicare Annual Wellness Visit  Name: Cj Zavala Date: 2021   MRN: [id number] Sex: Male   Age: 66 y.o. Ethnicity: Non-/Non    : 1950 Race: Rhea Young is here for Medicare AWV    Screenings for behavioral, psychosocial and functional/safety risks, and cognitive dysfunction are all negative except as indicated below. These results, as well as other patient data from the [address] form, are documented in Flowsheets linked to this Encounter. Allergies   Allergen Reactions    Bee Pollen     Pollen Extract     Tree Extract     Vicodin [Hydrocodone-Acetaminophen] Nausea And Vomiting       Prior to Visit Medications    Medication Sig Taking? Authorizing Provider   Multiple Vitamins-Minerals (THERAPEUTIC MULTIVITAMIN-MINERALS) tablet Take 1 tablet by mouth daily Yes Historical Provider, MD   glipiZIDE (GLUCOTROL XL) 2.5 MG extended release tablet Take 1 tablet by mouth daily Yes Mariel Sosa PA-C   pioglitazone (ACTOS) 30 MG tablet Take 1 tablet by mouth daily Yes Mariel Sosa PA-C   atorvastatin (LIPITOR) 20 MG tablet TAKE 1 TABLET BY MOUTH ONE TIME A DAY Yes Mariel Sosa PA-C   dilTIAZem (CARTIA XT) 300 MG extended release capsule Take 1 capsule by mouth daily Yes Mariel Sosa PA-C   lisinopril (PRINIVIL;ZESTRIL) 5 MG tablet TAKE 1 TABLET BY MOUTH ONE TIME A DAY Yes Jennifer Benoit MD   metFORMIN (GLUCOPHAGE) 500 MG tablet TAKE 2 TABS BY MOUTH IN THE MORNING, 1 TAB AT NOON, AND 2 TABS IN THE EVENING WITH MEALS Yes Jennifer Benoit MD   dapagliflozin (FARXIGA) 10 MG tablet Take 1 tablet by mouth every morning Yes Jennifer Benoit MD   diphenoxylate-atropine (LOMOTIL) 2.5-0.025 MG per tablet Take 1 tablet by mouth.  1 to 2 times daily prn Yes Historical Provider, MD   COMBIGAN 0.2-0.5 % ophthalmic solution  Yes Historical Provider, MD   LATANOPROST OP Apply to eye Yes Historical Provider, MD   Probiotic Product (PROBIOTIC DAILY PO) Take by mouth Yes Historical Provider, MD   niacinamide 500 MG tablet Take 500 mg by mouth 2 times daily (with meals) Yes Historical Provider, MD   PANTOTHENIC ACID PO Take 500 mg by mouth daily. Yes Historical Provider, MD   Multiple Vitamins-Minerals (OCUVITE) TABS oral tablet Take 1 tablet by mouth daily.    Yes Historical Provider, MD   vitamin D3 (CHOLECALCIFEROL) 400 units TABS tablet Take by mouth daily  Yes Historical Provider, MD       Past Medical History:   Diagnosis Date    Benign prostatic hyperplasia     DVT (deep venous thrombosis) ([facility] [address]     Hemochromatosis     History of pulmonary embolism     Hyperlipidemia     Hypertension, essential     Kidney stone     in er for kidney stones(1/14/13), had Dilaudid, saw Dr Swetha Jang, did pass one\"    Major depressive disorder     Mantoux: positive     Polycythemia vera(238.4) dx late 1980s    \"per wife-(1/18/13) \"according to Dr Miroslaav Perry he does not have this but he does have high iron\"    Pulmonary embolism [facility])     old chart gives hx brant PE with right lower DVT 7/2012(pc)(had scope left knee 4/2012)- had Filter placement done 7/31/2012    Tinnitus     Type 2 diabetes mellitus ([facility] [address]     dx 2003       Past Surgical History:   Procedure Laterality Date    COLONOSCOPY      KNEE ARTHROSCOPY Right 04/2012    LITHOTRIPSY      SKIN BIOPSY  June 2012    Spermatocelectomy    VASECTOMY  1980    VENA CAVA FILTER PLACEMENT         Family History   Problem Relation Age of Onset    Arthritis Mother     Cancer Mother         kidney cancer    Miscarriages / Stillbirths Mother     Stroke Mother     Heart Disease Father         MI    High Cholesterol Father     Kidney Disease Brother     Arthritis Sister     Mental Illness Sister     Bipolar Disorder Sister     No Known Problems Sister     Heart Disease Son     Stroke Son         age 32       CareTeam (Including outside providers/suppliers regularly involved in providing care):   Patient Care Team:  Camilla Renee MD as PCP - General (Family Medicine)  Camilla Renee MD as PCP [facility]    Wt Readings from Last 3 Encounters:   06/30/21 227 lb (103 kg)   06/17/21 228 lb (103.4 kg)   06/05/21 235 lb (106.6 kg)     There were no vitals filed for this visit. There is no height or weight on file to calculate BMI. Based upon direct observation of the patient, evaluation of cognition reveals recent and remote memory intact. Patient's complete Health Risk Assessment and screening values have been reviewed and are found in Flowsheets. The following problems were reviewed today and where indicated follow up appointments were made and/or referrals ordered. Positive Risk Factor Screenings with Interventions:     Fall Risk:  2 or more falls in past year?: no  Fall with injury in past year?: (!) yes (slipped 3-4 weeks ago on a wet railroad tie, fell on right side, developed sharp pain in ribs 3 days after fall, went to ED, had bruised ribs and is no longer sore)  Fall Risk Interventions:    · Patient declines any further evaluation/treatment for this issue        General Health and ACP:  General  In general, how would you say your health is?: Fair  In the past 7 days, have you experienced any of the following?  New or Increased Pain, New or Increased Fatigue, Loneliness, Social Isolation, Stress or Anger?: None of These  Do you get the social and emotional support that you need?: Yes  Do you have a Living Will?: Yes  Advance Directives     Power of [address] [place] [address] Will ACP-Advance Directive ACP-Power of     Not on File Not on File Not on File Not on File      General Health Risk Interventions:  · No Living Will: ACP documents already completed- patient asked to provide copy to the office    Health Habits/Nutrition:  Health Habits/Nutrition  Do you exercise for at least 20 minutes 2-3 times per week?: Yes  Have you lost any weight without trying in the past 3 months?: No  Do you eat only one meal per day?: No  Have you seen the dentist within the past year?: Yes     Health Habits/Nutrition Interventions:  · Nutritional issues:  patient is not ready to address his/her nutritional/weight issues at this time    Hearing/Vision:  No exam data present  Hearing/Vision  Do you or your family notice any trouble with your hearing that hasn't been managed with hearing aids?: (!) Yes (has hearing aids, states he doesn't wear them at home)  Do you have difficulty driving, watching TV, or doing any of your daily activities because of your eyesight?: No  Have you had an eye exam within the past year?: Yes  Hearing/Vision Interventions:  · Hearing concerns:  patient declines any further evaluation/treatment for hearing issues      Personalized Preventive Plan   Current Health Maintenance Status  Immunization History   Administered Date(s) Administered    COVID-19, Moderna, PF, 100mcg/0.5mL 01/10/2021, 02/07/2021    Hep B/Hib (Comvax) 01/09/2009, 02/09/2009, 07/14/2009    Influenza Virus Vaccine 10/21/2013    Influenza, High Dose (Fluzone 65 yrs and older) 10/06/2014, 10/09/2018    Influenza, Quadv, IM, PF (6 mo and older Fluzone, Flulaval, Fluarix, and 3 yrs and older Afluria) 10/21/2013    Influenza, Quadv, adjuvanted, 65 yrs +, IM, PF (Fluad) 10/02/2020    Pneumococcal Conjugate 13-valent ([id number]) 12/14/2015    Pneumococcal Polysaccharide ([id number]) 01/04/2010, 10/02/2020    Td (Adult), 5 Lf Tetanus Toxoid, Pf (Tenivac, Decavac) 12/14/2015    Tdap (Boostrix, Adacel) 02/07/2020    Zoster Live (Zostavax) 10/02/2015    Zoster Recombinant (Shingrix) 01/01/2018, 03/01/2018        Health Maintenance   Topic Date Due    Diabetic retinal exam  06/12/2019    Annual Wellness Visit (AWV)  06/25/2021    Flu vaccine (1) 09/01/2021    Diabetic microalbuminuria test  10/02/2021    Diabetic foot exam  10/16/2021    Lipid screen  02/03/2022    A1C test (Diabetic or Prediabetic) 06/17/2022    Potassium monitoring  06/17/2022    Creatinine monitoring  06/17/2022    DTaP/Tdap/Td vaccine (2 - Td or Tdap) 02/07/2030    Colon cancer screen colonoscopy  12/08/2030    Shingles Vaccine  Completed    Pneumococcal 65+ years Vaccine  Completed    COVID-19 Vaccine  Completed    Hepatitis C screen  Completed    Hepatitis A vaccine  Aged Out    Hib vaccine  Aged Out    Meningococcal (ACWY) vaccine  Aged Out     Recommendations for [employer] Due: see orders and patient instructions/AVS. ANA requested diabetic retinal exam from Dr. Pallavi Fatima office, and they are faxing the report to us. Unable to obtain 3 vital signs due to patient not having equipment to take blood pressure/temperature. Recommended screening schedule for the next 5-10 years is provided to the patient in written form: see Patient Instructions/AVS.    Alejandrina VALVERDE LPN, 9/6/2060, performed the documented evaluation under the direct supervision of the attending physician. Miracle Myers, was evaluated through a synchronous (real-time) audio-video encounter. The patient (or guardian if applicable) is aware that this is a billable service. Verbal consent to proceed has been obtained within the past 12 months. The visit was conducted pursuant to the emergency declaration under the [address] [place] [address] and the [employer] General Act. Patient identification was verified, and a caregiver was present when appropriate. The patient was located in the state of [place], where the provider was credentialed to provide care. Total time spent for this encounter: Not billed by time    --Alejandrina Chua LPN on 3/2/0275 at 0:64 AM    An electronic signature was used to authenticate this note. This encounter was performed under Rosemarie truong MDs, direct supervision, 7/1/2021.

## 2021-07-01 NOTE — TELEPHONE ENCOUNTER
Spoke with Marisol Wilson in Medical Records at Dr. Radha Abarca office (Ophthalmologist), requested diabetic retinal exam be faxed to our office for scanning into .

## 2021-08-10 DIAGNOSIS — E11.9 TYPE 2 DIABETES MELLITUS WITHOUT COMPLICATION, WITHOUT LONG-TERM CURRENT USE OF INSULIN (HCC): ICD-10-CM

## 2021-10-18 ENCOUNTER — OFFICE VISIT (OUTPATIENT)
Dept: FAMILY MEDICINE CLINIC | Age: 71
End: 2021-10-18
Payer: MEDICARE

## 2021-10-18 VITALS
DIASTOLIC BLOOD PRESSURE: 70 MMHG | HEART RATE: 90 BPM | SYSTOLIC BLOOD PRESSURE: 118 MMHG | BODY MASS INDEX: 31.11 KG/M2 | WEIGHT: 229.7 LBS | HEIGHT: 72 IN | OXYGEN SATURATION: 97 %

## 2021-10-18 DIAGNOSIS — E11.9 TYPE 2 DIABETES MELLITUS WITHOUT COMPLICATION, WITHOUT LONG-TERM CURRENT USE OF INSULIN (HCC): Primary | ICD-10-CM

## 2021-10-18 DIAGNOSIS — E78.49 OTHER HYPERLIPIDEMIA: ICD-10-CM

## 2021-10-18 DIAGNOSIS — I10 HYPERTENSION, ESSENTIAL: ICD-10-CM

## 2021-10-18 DIAGNOSIS — E11.9 TYPE 2 DIABETES MELLITUS WITHOUT COMPLICATION, WITHOUT LONG-TERM CURRENT USE OF INSULIN (HCC): ICD-10-CM

## 2021-10-18 DIAGNOSIS — N40.0 BENIGN PROSTATIC HYPERPLASIA WITHOUT LOWER URINARY TRACT SYMPTOMS: Chronic | ICD-10-CM

## 2021-10-18 LAB
CHOLESTEROL, TOTAL: 154 MG/DL (ref 0–199)
CREATININE URINE: 70.6 MG/DL (ref 39–259)
HDLC SERPL-MCNC: 58 MG/DL (ref 40–60)
LDL CHOLESTEROL CALCULATED: 79 MG/DL
MICROALBUMIN UR-MCNC: <1.2 MG/DL
MICROALBUMIN/CREAT UR-RTO: NORMAL MG/G (ref 0–30)
TRIGL SERPL-MCNC: 87 MG/DL (ref 0–150)
VLDLC SERPL CALC-MCNC: 17 MG/DL

## 2021-10-18 PROCEDURE — 1123F ACP DISCUSS/DSCN MKR DOCD: CPT | Performed by: FAMILY MEDICINE

## 2021-10-18 PROCEDURE — G8484 FLU IMMUNIZE NO ADMIN: HCPCS | Performed by: FAMILY MEDICINE

## 2021-10-18 PROCEDURE — 99213 OFFICE O/P EST LOW 20 MIN: CPT | Performed by: FAMILY MEDICINE

## 2021-10-18 PROCEDURE — 3044F HG A1C LEVEL LT 7.0%: CPT | Performed by: FAMILY MEDICINE

## 2021-10-18 PROCEDURE — 4040F PNEUMOC VAC/ADMIN/RCVD: CPT | Performed by: FAMILY MEDICINE

## 2021-10-18 PROCEDURE — 2022F DILAT RTA XM EVC RTNOPTHY: CPT | Performed by: FAMILY MEDICINE

## 2021-10-18 PROCEDURE — 3017F COLORECTAL CA SCREEN DOC REV: CPT | Performed by: FAMILY MEDICINE

## 2021-10-18 PROCEDURE — G0008 ADMIN INFLUENZA VIRUS VAC: HCPCS | Performed by: FAMILY MEDICINE

## 2021-10-18 PROCEDURE — 90694 VACC AIIV4 NO PRSRV 0.5ML IM: CPT | Performed by: FAMILY MEDICINE

## 2021-10-18 PROCEDURE — G8427 DOCREV CUR MEDS BY ELIG CLIN: HCPCS | Performed by: FAMILY MEDICINE

## 2021-10-18 PROCEDURE — G8417 CALC BMI ABV UP PARAM F/U: HCPCS | Performed by: FAMILY MEDICINE

## 2021-10-18 PROCEDURE — 1036F TOBACCO NON-USER: CPT | Performed by: FAMILY MEDICINE

## 2021-10-18 ASSESSMENT — ENCOUNTER SYMPTOMS
EYE PAIN: 0
SHORTNESS OF BREATH: 0
ABDOMINAL PAIN: 0
TROUBLE SWALLOWING: 0
DIARRHEA: 0
NAUSEA: 0
VOMITING: 0
BLOOD IN STOOL: 0
CHEST TIGHTNESS: 0
WHEEZING: 0

## 2021-10-18 NOTE — PROGRESS NOTES
HISTORY  Past Medical History:   Diagnosis Date    Benign prostatic hyperplasia     DVT (deep venous thrombosis) (HCC)     Hemochromatosis     History of pulmonary embolism     Hyperlipidemia     Hypertension, essential     Kidney stone     in er for kidney stones(1/14/13), had Leni, saw Dr Fahad Linton, did pass one\"    Major depressive disorder     Mantoux: positive     Polycythemia vera(238.4) dx late 1980s    \"per wife-(1/18/13) \"according to Dr Onnie Castleman he does not have this but he does have high iron\"    Pulmonary embolism [facility])     old chart gives hx brant PE with right lower DVT 7/2012(pc)(had scope left knee 4/2012)- had Filter placement done 7/31/2012    Tinnitus     Type 2 diabetes mellitus ([facility] [address]     dx 2003       FAMILY HISTORY  Family History   Problem Relation Age of Onset    Arthritis Mother     Cancer Mother         kidney cancer    Miscarriages / Stillbirths Mother     Stroke Mother     Heart Disease Father         MI    High Cholesterol Father     Kidney Disease Brother     Arthritis Sister     Mental Illness Sister     Bipolar Disorder Sister     No Known Problems Sister     Heart Disease Son     Stroke Son         age 32       SOCIAL HISTORY  Social History     Socioeconomic History    Marital status:      Spouse name: Not on file    Number of children: Not on file    Years of education: Not on file    Highest education level: Not on file   Occupational History    Not on file   Tobacco Use    Smoking status: Never Smoker    Smokeless tobacco: Never Used   Vaping Use    Vaping Use: Never used   Substance and Sexual Activity    Alcohol use: No    Drug use: No    Sexual activity: Not on file   Other Topics Concern    Not on file   Social History Narrative    Not on file     Social Determinants of Health     Financial Resource Strain: Low Risk     Difficulty of Paying Living Expenses: Not hard at all   Food Insecurity: No Food Insecurity    Worried About Running Out of Food in the Last Year: Never true    Ran Out of Food in the Last Year: Never true   Transportation Needs:     Lack of Transportation (Medical):      Lack of Transportation (Non-Medical):    Physical Activity:     Days of Exercise per Week:     Minutes of Exercise per Session:    Stress:     Feeling of Stress :    Social Connections:     Frequency of Communication with Friends and Family:     Frequency of Social Gatherings with Friends and Family:     Attends Hindu Services:     Active Member of Clubs or Organizations:     Attends Club or Organization Meetings:     Marital Status:    Intimate Partner Violence:     Fear of Current or Ex-Partner:     Emotionally Abused:     Physically Abused:     Sexually Abused:         SURGICAL HISTORY  Past Surgical History:   Procedure Laterality Date    COLONOSCOPY      KNEE ARTHROSCOPY Right 04/2012    LITHOTRIPSY      SKIN BIOPSY  June 2012    Spermatocelectomy    VASECTOMY  1980    VENA CAVA FILTER PLACEMENT                   CURRENT MEDICATIONS  Current Outpatient Medications   Medication Sig Dispense Refill    dapagliflozin (FARXIGA) 10 MG tablet Take 1 tablet by mouth every morning 30 tablet 5    Multiple Vitamins-Minerals (THERAPEUTIC MULTIVITAMIN-MINERALS) tablet Take 1 tablet by mouth daily      glipiZIDE (GLUCOTROL XL) 2.5 MG extended release tablet Take 1 tablet by mouth daily 90 tablet 1    pioglitazone (ACTOS) 30 MG tablet Take 1 tablet by mouth daily 90 tablet 1    atorvastatin (LIPITOR) 20 MG tablet TAKE 1 TABLET BY MOUTH ONE TIME A DAY 90 tablet 1    dilTIAZem (CARTIA XT) 300 MG extended release capsule Take 1 capsule by mouth daily 90 capsule 1    lisinopril (PRINIVIL;ZESTRIL) 5 MG tablet TAKE 1 TABLET BY MOUTH ONE TIME A DAY 90 tablet 1    metFORMIN (GLUCOPHAGE) 500 MG tablet TAKE 2 TABS BY MOUTH IN THE MORNING, 1 TAB AT NOON, AND 2 TABS IN THE EVENING WITH MEALS 450 tablet 1    COMBIGAN 0.2-0.5 % ophthalmic solution       LATANOPROST OP Apply to eye      Probiotic Product (PROBIOTIC DAILY PO) Take by mouth      niacinamide 500 MG tablet Take 500 mg by mouth 2 times daily (with meals)      PANTOTHENIC ACID PO Take 500 mg by mouth daily.  Multiple Vitamins-Minerals (OCUVITE) TABS oral tablet Take 1 tablet by mouth daily.  vitamin D3 (CHOLECALCIFEROL) 400 units TABS tablet Take by mouth daily        No current facility-administered medications for this visit. ALLERGIES  Allergies   Allergen Reactions    Bee Pollen     Pollen Extract     Tree Extract     Vicodin [Hydrocodone-Acetaminophen] Nausea And Vomiting       PHYSICAL EXAM    /70 (Site: Left Upper Arm, Position: Sitting, Cuff Size: Medium Adult)   Pulse 90   Ht 6' (1.829 m)   Wt 229 lb 11.2 oz (104.2 kg)   SpO2 97%   BMI 31.15 kg/m²     Physical Exam  Vitals and nursing note reviewed. Constitutional:       General: He is not in acute distress. Appearance: Normal appearance. He is well-developed. He is not ill-appearing or toxic-appearing. HENT:      Head: Normocephalic and atraumatic. Nose: Nose normal. No congestion. Mouth/Throat:      Mouth: Mucous membranes are moist.      Pharynx: Oropharynx is clear. Eyes:      Pupils: Pupils are equal, round, and reactive to light. Cardiovascular:      Rate and Rhythm: Normal rate and regular rhythm. Heart sounds: Normal heart sounds. No murmur heard. Pulmonary:      Effort: Pulmonary effort is normal. No respiratory distress. Breath sounds: Normal breath sounds. No wheezing or rales. Abdominal:      Palpations: Abdomen is soft. Musculoskeletal:         General: No swelling. Normal range of motion. Cervical back: Normal range of motion and neck supple. No rigidity. Skin:     General: Skin is warm and dry. Coloration: Skin is not jaundiced. Findings: No bruising. Neurological:      General: No focal deficit present.       Mental Status: He is alert and oriented to person, place, and time. Mental status is at baseline. Cranial Nerves: No cranial nerve deficit. Motor: No weakness. Psychiatric:         Mood and Affect: Mood normal.         Behavior: Behavior normal.         Thought Content: Thought content normal.         ASSESSMENT & PLAN     Diagnosis Orders   1. Type 2 diabetes mellitus without complication, without long-term current use of insulin ([facility])  MICROALBUMIN / CREATININE URINE RATIO   2. Hypertension, essential     3. Other hyperlipidemia  LIPID PANEL   4. Benign prostatic hyperplasia without lower urinary tract symptoms     Patient did receive high-dose flu shot today- we will get urine for microalbumin done along with a lipid panel. Advance directives were placed and his chart. He is to get Covid booster when available to him. He is to follow-up for test results and continue to work on healthy lifestyle. Return in about 4 months (around 2/18/2022).          Electronically signed by Chana Welch MD on 10/18/2021

## 2021-10-28 RX ORDER — LISINOPRIL 5 MG/1
TABLET ORAL
Qty: 90 TABLET | Refills: 0 | Status: SHIPPED | OUTPATIENT
Start: 2021-10-28 | End: 2021-12-10 | Stop reason: SDUPTHER

## 2021-11-10 NOTE — PROGRESS NOTES
Patient Name: Ying Ng  Patient : 1950  Patient MRN: [id number]     Primary Oncologist: Alexa Kramer MD  Referring Provider: Isaias Medina MD     Date of Service: 12/10/2021      Chief Complaint:   Chief Complaint   Patient presents with    Follow-up     He came in for follow-up visit. Patient Active Problem List:     DM (diabetes mellitus) ([address]     BPH (benign prostatic hyperplasia)     Polycythemia     DVT (deep venous thrombosis)-R leg     Diarrhea     Pre-syncope     Type 2 diabetes mellitus (HCC)     Hypertension, essential     Hemochromatosis     Tinnitus     Major depressive disorder     Benign prostatic hyperplasia     History of pulmonary embolism     Mantoux: positive     Other specified glaucoma     Chronic fatigue     Functional diarrhea     HPI:   Mauro Acosta is a pleasant 69-year-old male patient who had bilateral pulmonary embolism and right lower extremity DVT in 2012. He had arthroscopic left knee surgery in 2012 and a history of spermatocele surgery in 2012 by Dr. Penny Burch. These two surgeries resulted in inactivity. He believed that this was the trigger factor for his DVT and PE. He denied any travel history or prior history of blood clots. He had an episode of chest pain and dyspnea on 2012, and went for a CT of the chest at [facility], showing prominent bilateral pulmonary embolism. Venous duplex study of the right lower extremity, due to the leg pain and swelling, showed DVT. He was then hospitalized for further management and started on anticoagulation. He had a Günther Tulip filter placement on 2012, and initially was offered thrombolytic treatment, which he refused. He was in ICU for four days, though he denied any history of hypotension  Blood test showed negative JAK2 study. D-dimer was less than 200. He had heterozygous H63D hemochromatosis.   Venous Doppler study in 2012 showed no evidence for DVT. He was seen by Dr. Duane Lovett who attempted to remove the IVC filter though due to the possibility of causing more problems by removal of the IVC filter, the procedure was cancelled. He eventually stopped his coumadin. Iron study on February 20, 2013 showed ferritin 78, iron 84, TIBC 348, transferrin saturation 24. White cell count was 5.5, hemoglobin 16.1, hematocrit 47.4, and platelet 774. Blood test on August 21, 2013 showed ferritin at 108, iron 169, iron-binding 327, transferrin saturation 35 percent. White cell count was 11.7, hemoglobin 16.6, hematocrit 47.4, platelet 387. He had a kidney stone removed in July 2013 by Dr. Brianna García. Colonoscopy in September 2012 by Dr. Dacia Esposito showed no polyps. He was in the hospital in September 2014. CT chest showed lung nodule and no PE. Blood tests in November 2014 showed normal CBCD. Iron was 97, TIBC 337, ferritin 67. CT chest in January 2015 showed stable lung nodule. Blood test in November 2015 showed normal CBC. Ferritin was 48. Reportedly, CAT scan of the chest in early 2016 revealed stable lung nodule and Dr. Zia Miranda plans to have a followup CAT scan in 18 months. Blood test in November 2016 revealed stable CBC, with white cell 6.3, hemoglobin 14.1, hematocrit 42.6, platelet 152. Ferritin was 78. He had colonoscopy at the age of 71 in 200. Follow-up colonoscopy in 10 years was recommended. He had skin cancer removed from the right ear by Dr. Lucía Herman and lesion to the left temple was frozen. He has been taking vitamin B3 500 mg twice a day prescribed by Dr. Lucía Herman. CT of the chest in October 2017 revealed stable upper lobe nodule compatible with benign granuloma. No further follow up was necessary. Labs in November 2019 were reviewed. He does not need any therapeutic phlebotomy. CBC in November 2020 was grossly unremarkable. Ferritin was 50. He has been having watery diarrhea since January 2020.   He had colonoscopy in February and May 2020. He had upper endoscopic study in November 2020. Reportedly he has gastritis. Small bowel follow-through study in March 2020 was unremarkable. C. difficile stool ova and parasite in January 2020 were negative. He was told that he has small bowel small intestine bacterial overgrowth. He is taking probiotic. At one time he was  on antibiotics. He denied any weight loss though he feels tired. He denied any wheezing or flushing. In October the diarrhea has improved but he still has intermittent watery stool. He will follow up with Dr. Lanette Calvin on December 18, 2020. I discussed with him about potential Octreoscan. He will discuss with GI first before considering Octreoscan. He may call me after the discussion with GI. We discussed about healthy diet and lifestyle. On December 10, 2021 he came in for follow up visit. Diarrhea has resolved at this time. Reportedly GI ordered work-up which was negative for potential neuroendocrine tumor. 12/2021 ferritin was 38, TIBC 375. CBC was unremarkable. CMP in June 2021 was grossly unremarkable. He denied taking any blood thinner. I explained to him that he does not have hereditary hemochromatosis since he only has heterozygous H63D mutation. He denied any bowel habit changes, dark stool or any blood in stool in the urine. Reportedly he had colonoscopy in early 2020, EGD in late 2020 and stool occult in summer 2020. Follow-up colonoscopy in 5 years was recommended. I will order stool occult today. I will repeat CBC and iron study in 6 months. He has any bowel habit changes issue, blood in stool, dark stool or blood in the urine advised to follow-up with me sooner. He may take iron pill every other day. He denies signs and symptoms to suggest recurrent blood clot. He has chronic right lower extremity swelling and I recommend to wear compression stocking. No acute pain. Denied any nausea, vomiting or diarrhea.   No fever or chills. No chest pain, shortness of breath or palpitation. No headache or dizzy spell. No specific bone pain. No melena or hematochezia. Denied any dysuria or hematuria. PAST MEDICAL HISTORY:  Polycythemia in 1989, hypertension, diabetes, arthroscopic left knee surgery in April of 2012, spermatocele surgery in June of 2012, DVT and pulmonary embolism in July of 2012. He was diagnosed with skin cancer in his right ear, which was removed by Dr. Stephy Arias. FAMILY HISTORY:  Mother had kidney tumor, father had heart disease. SOCIAL HISTORY:  He denied any history of smoking or alcohol. He is  and has four children. He is retired. One of his sons was diagnosed with aortic stenosis and has a history of stroke. LABORATORY STUDIES:  February 13, 2013:  Ferritin was 78, iron 84, TIBC 348, transferrin saturation 24 percent, WBC 5.5, hemoglobin 16.1, and platelet 608,605. Review of Systems: \"Per interval history; otherwise 10 point ROS is negative. \"     Vital Signs:  /77 (Site: Left Upper Arm, Position: Sitting, Cuff Size: Large Adult)   Pulse 88   Temp 96.9 °F (36.1 °C) (Temporal)   Resp 16   Ht 6' (1.829 m)   Wt 232 lb 3.2 oz (105.3 kg)   SpO2 97%   BMI 31.49 kg/m²     Physical Exam:  CONSTITUTIONAL: awake, alert, cooperative, no apparent distress   EYES: pupils equal, round and reactive to light, sclera clear and conjunctiva normal  ENT: Normocephalic, without obvious abnormality, atraumatic  NECK: supple, symmetrical, no jugular venous distension and no carotid bruits   HEMATOLOGIC/LYMPHATIC: no cervical, supraclavicular or axillary lymphadenopathy   LUNGS: no increased work of breathing and clear to auscultation   CARDIOVASCULAR: regular rate and rhythm, normal S1 and S2, no murmur  ABDOMEN: normal bowel sound, soft, non-distended, non-tender, no masses palpated, no hepatosplenomegaly   MUSCULOSKELETAL: full range of motion noted, tone is normal  NEUROLOGIC: awake, alert, oriented to name, place and time. Motor skills grossly intact. CN II - XII grossly intact. SKIN: Normal skin color, texture, turgor and no jaundice. appears intact   EXTREMITIES: no LE edema. No cyanosis. Homans signs were negative. Labs:  Hematology:  Lab Results   Component Value Date    WBC 5.8 12/03/2021    RBC 4.97 12/03/2021    HGB 15.9 12/03/2021    HCT 47.1 12/03/2021    MCV 94.8 12/03/2021    MCH 32.0 (H) 12/03/2021    MCHC 33.8 12/03/2021    RDW 14.9 12/03/2021     12/03/2021    MPV 8.5 12/03/2021    SEGSPCT 52.7 12/03/2021    EOSRELPCT 1.9 12/03/2021    BASOPCT 0.3 12/03/2021    LYMPHOPCT 35.6 12/03/2021    MONOPCT 9.5 (H) 12/03/2021    SEGSABS 3.0 12/03/2021    EOSABS 0.1 12/03/2021    BASOSABS 0.0 12/03/2021    LYMPHSABS 2.1 12/03/2021    MONOSABS 0.6 12/03/2021    DIFFTYPE AUTOMATED DIFFERENTIAL 12/03/2021     Chemistry:  Lab Results   Component Value Date     06/17/2021    K 4.4 06/17/2021     06/17/2021    CO2 25 06/17/2021    BUN 15 06/17/2021    CREATININE 0.9 06/17/2021    GLUCOSE 180 (H) 06/17/2021    CALCIUM 9.7 06/17/2021    PROT 6.8 06/17/2021    LABALBU 4.7 06/17/2021    BILITOT 0.6 06/17/2021    ALKPHOS 63 06/17/2021    AST 11 (L) 06/17/2021    ALT 15 06/17/2021    LABGLOM >60 06/17/2021    GFRAA >60 06/17/2021    AGRATIO 2.2 06/17/2021    GLOB 2.1 06/17/2021    POCGLU 248 (H) 08/23/2014     Lab Results   Component Value Date    HOMOCYSTEINE 8.8 07/31/2012     Immunology:  Lab Results   Component Value Date    PROT 6.8 06/17/2021     Coagulation Panel:  Lab Results   Component Value Date    PROTIME 10.3 08/22/2014    INR 0.95 08/22/2014    APTT 25.5 08/22/2014    DDIMER <200 12/27/2012     Observations:  No data recorded        Assessment & Plan:    1. He had a history of right lower extremity DVT and bilateral PE, likely provoked by the prior surgery in July 2012, status post IVC filter. He was treated with anticoagulation, including Coumadin.   He is currently not on  anticoagulation. He does not have any signs or symptoms to suggest recurrent blood clot. 2. He has H63D heterozygous. Labs in November 2019 were reviewed. CBC and iron study in November 2020 were reviewed. 3. He has negative JAK2. He has intermittent watery stool. Has been work-up by GI. I am willing to order Octreoscan to rule out carcinoid however he will discuss this with GI.    4. Reportedly, CAT scan of the chest in early 2015 showed stable lung nodule. Followup CAT scans in October 2017 revealed stable right upper lung nodule, consistent with benign granuloma. No followup study necessary, as per the radiologist.      5.  He has borderline low ferritin. He denied any signs of bleeding. I will order stool occult today. I will repeat iron study prior to next office visit in 6 months. We discussed about healthy diet and life style. RTC in 6 months follow up blood tests, including iron study. All of his questions have been answered for today. Recent imaging and labs were reviewed and discussed with the patient.

## 2021-12-03 ENCOUNTER — HOSPITAL ENCOUNTER (OUTPATIENT)
Dept: INFUSION THERAPY | Age: 71
Discharge: HOME | End: 2021-12-03
Payer: MEDICARE

## 2021-12-03 DIAGNOSIS — E83.110 HEREDITARY HEMOCHROMATOSIS (HCC): ICD-10-CM

## 2021-12-03 LAB
BASOPHILS ABSOLUTE: 0 K/CU MM
BASOPHILS RELATIVE PERCENT: 0.3 % (ref 0–1)
DIFFERENTIAL TYPE: ABNORMAL
EOSINOPHILS ABSOLUTE: 0.1 K/CU MM
EOSINOPHILS RELATIVE PERCENT: 1.9 % (ref 0–3)
FERRITIN: 38 NG/ML (ref 30–400)
HCT VFR BLD CALC: 47.1 % (ref 42–52)
HEMOGLOBIN: 15.9 GM/DL (ref 13.5–18)
IRON: 111 UG/DL (ref 59–158)
LYMPHOCYTES ABSOLUTE: 2.1 K/CU MM
LYMPHOCYTES RELATIVE PERCENT: 35.6 % (ref 24–44)
MCH RBC QN AUTO: 32 PG (ref 27–31)
MCHC RBC AUTO-ENTMCNC: 33.8 % (ref 32–36)
MCV RBC AUTO: 94.8 FL (ref 78–100)
MONOCYTES ABSOLUTE: 0.6 K/CU MM
MONOCYTES RELATIVE PERCENT: 9.5 % (ref 0–4)
PCT TRANSFERRIN: 30 % (ref 10–44)
PDW BLD-RTO: 14.9 % (ref 11.7–14.9)
PLATELET # BLD: 217 K/CU MM (ref 140–440)
PMV BLD AUTO: 8.5 FL (ref 7.5–11.1)
RBC # BLD: 4.97 M/CU MM (ref 4.6–6.2)
SEGMENTED NEUTROPHILS ABSOLUTE COUNT: 3 K/CU MM
SEGMENTED NEUTROPHILS RELATIVE PERCENT: 52.7 % (ref 36–66)
TOTAL IRON BINDING CAPACITY: 375 UG/DL (ref 250–450)
UNSATURATED IRON BINDING CAPACITY: 264 UG/DL (ref 110–370)
WBC # BLD: 5.8 K/CU MM (ref 4–10.5)

## 2021-12-03 PROCEDURE — 82728 ASSAY OF FERRITIN: CPT

## 2021-12-03 PROCEDURE — 85025 COMPLETE CBC W/AUTO DIFF WBC: CPT

## 2021-12-03 PROCEDURE — 83550 IRON BINDING TEST: CPT

## 2021-12-03 PROCEDURE — 36415 COLL VENOUS BLD VENIPUNCTURE: CPT

## 2021-12-03 PROCEDURE — 83540 ASSAY OF IRON: CPT

## 2021-12-10 ENCOUNTER — PATIENT MESSAGE (OUTPATIENT)
Dept: FAMILY MEDICINE CLINIC | Age: 71
End: 2021-12-10

## 2021-12-10 ENCOUNTER — HOSPITAL ENCOUNTER (OUTPATIENT)
Dept: INFUSION THERAPY | Age: 71
Discharge: HOME | End: 2021-12-10
Payer: MEDICARE

## 2021-12-10 ENCOUNTER — OFFICE VISIT (OUTPATIENT)
Dept: ONCOLOGY | Age: 71
End: 2021-12-10
Payer: MEDICARE

## 2021-12-10 VITALS
WEIGHT: 232.2 LBS | OXYGEN SATURATION: 97 % | RESPIRATION RATE: 16 BRPM | DIASTOLIC BLOOD PRESSURE: 77 MMHG | BODY MASS INDEX: 31.45 KG/M2 | SYSTOLIC BLOOD PRESSURE: 138 MMHG | HEART RATE: 88 BPM | TEMPERATURE: 96.9 F | HEIGHT: 72 IN

## 2021-12-10 DIAGNOSIS — Z86.718 HISTORY OF DVT (DEEP VEIN THROMBOSIS): ICD-10-CM

## 2021-12-10 DIAGNOSIS — E11.9 TYPE 2 DIABETES MELLITUS WITHOUT COMPLICATION, WITHOUT LONG-TERM CURRENT USE OF INSULIN (HCC): ICD-10-CM

## 2021-12-10 DIAGNOSIS — E61.1 LOW IRON: Primary | ICD-10-CM

## 2021-12-10 DIAGNOSIS — I10 HYPERTENSION, ESSENTIAL: ICD-10-CM

## 2021-12-10 DIAGNOSIS — E78.49 OTHER HYPERLIPIDEMIA: ICD-10-CM

## 2021-12-10 PROCEDURE — G8417 CALC BMI ABV UP PARAM F/U: HCPCS | Performed by: INTERNAL MEDICINE

## 2021-12-10 PROCEDURE — 1123F ACP DISCUSS/DSCN MKR DOCD: CPT | Performed by: INTERNAL MEDICINE

## 2021-12-10 PROCEDURE — 99213 OFFICE O/P EST LOW 20 MIN: CPT | Performed by: INTERNAL MEDICINE

## 2021-12-10 PROCEDURE — 1036F TOBACCO NON-USER: CPT | Performed by: INTERNAL MEDICINE

## 2021-12-10 PROCEDURE — 99211 OFF/OP EST MAY X REQ PHY/QHP: CPT

## 2021-12-10 PROCEDURE — G8427 DOCREV CUR MEDS BY ELIG CLIN: HCPCS | Performed by: INTERNAL MEDICINE

## 2021-12-10 PROCEDURE — 3017F COLORECTAL CA SCREEN DOC REV: CPT | Performed by: INTERNAL MEDICINE

## 2021-12-10 PROCEDURE — 4040F PNEUMOC VAC/ADMIN/RCVD: CPT | Performed by: INTERNAL MEDICINE

## 2021-12-10 PROCEDURE — G8484 FLU IMMUNIZE NO ADMIN: HCPCS | Performed by: INTERNAL MEDICINE

## 2021-12-10 RX ORDER — ATORVASTATIN CALCIUM 20 MG/1
TABLET, FILM COATED ORAL
Qty: 90 TABLET | Refills: 1 | Status: SHIPPED | OUTPATIENT
Start: 2021-12-10 | End: 2022-06-10 | Stop reason: SDUPTHER

## 2021-12-10 RX ORDER — DILTIAZEM HYDROCHLORIDE 300 MG/1
300 CAPSULE, COATED, EXTENDED RELEASE ORAL DAILY
Qty: 90 CAPSULE | Refills: 1 | Status: SHIPPED | OUTPATIENT
Start: 2021-12-10 | End: 2022-06-22 | Stop reason: SDUPTHER

## 2021-12-10 RX ORDER — GLIPIZIDE 2.5 MG/1
2.5 TABLET, EXTENDED RELEASE ORAL DAILY
Qty: 90 TABLET | Refills: 1 | Status: SHIPPED | OUTPATIENT
Start: 2021-12-10 | End: 2022-06-10 | Stop reason: SDUPTHER

## 2021-12-10 RX ORDER — LISINOPRIL 5 MG/1
TABLET ORAL
Qty: 90 TABLET | Refills: 1 | Status: SHIPPED | OUTPATIENT
Start: 2021-12-10 | End: 2022-08-01 | Stop reason: SDUPTHER

## 2021-12-10 RX ORDER — PIOGLITAZONEHYDROCHLORIDE 30 MG/1
30 TABLET ORAL DAILY
Qty: 90 TABLET | Refills: 1 | Status: SHIPPED | OUTPATIENT
Start: 2021-12-10 | End: 2022-06-22 | Stop reason: SDUPTHER

## 2021-12-10 NOTE — TELEPHONE ENCOUNTER
From: Mylene Moon  To: Dr. Placido Vásquez  Sent: 12/10/2021 10:08 AM EST  Subject: Prescription refills    I need refills on Atorvastatin 20 mg. Candi Butt), Glipizide 2.5mg Candi Butt), Lisinopril 5 mg. Candi Butt), Pioglitazone 30 mg ([facility] mail order), Diltiazem 300 mg. ([facility] mail order). I request 90 pills (3 month supply) with 3 refills on each. Please confirm you have received this request let me know when this is done. Thank you.

## 2021-12-10 NOTE — PROGRESS NOTES
MA Rooming Questions  Patient: Gustavo Huber  MRN: [id number]    Date: 12/10/2021        1. Do you have any new issues? Yes- did have eye surgery      2. Do you need any refills on medications?    no    3. Have you had any imaging done since your last visit?   no    4. Have you been hospitalized or seen in the emergency room since your last visit here?   no    5. Did the patient have a depression screening completed today?  No    No data recorded     PHQ-9 Given to (if applicable):               PHQ-9 Score (if applicable):                     [] Positive     []  Negative              Does question #9 need addressed (if applicable)                     [] Yes    []  No               Ashley Vega CMA

## 2021-12-13 ENCOUNTER — HOSPITAL ENCOUNTER (OUTPATIENT)
Age: 71
Setting detail: SPECIMEN
Discharge: HOME | End: 2021-12-13
Payer: MEDICARE

## 2021-12-13 LAB — DIABETIC RETINOPATHY: NEGATIVE

## 2021-12-13 PROCEDURE — 82270 OCCULT BLOOD FECES: CPT

## 2021-12-14 LAB
CATARACTS: POSITIVE
DIABETIC RETINOPATHY: NEGATIVE
GLAUCOMA: NEGATIVE
INTRAOCULAR PRESSURE EYE: NORMAL
VISUAL ACUITY DISTANCE LEFT EYE: NORMAL
VISUAL ACUITY DISTANCE RIGHT EYE: NORMAL

## 2021-12-15 LAB — HEMOCCULT SP1 STL QL: NEGATIVE

## 2022-02-02 DIAGNOSIS — E11.9 TYPE 2 DIABETES MELLITUS WITHOUT COMPLICATION, WITHOUT LONG-TERM CURRENT USE OF INSULIN (HCC): ICD-10-CM

## 2022-02-18 ENCOUNTER — OFFICE VISIT (OUTPATIENT)
Dept: FAMILY MEDICINE CLINIC | Age: 72
End: 2022-02-18
Payer: MEDICARE

## 2022-02-18 VITALS
DIASTOLIC BLOOD PRESSURE: 78 MMHG | WEIGHT: 228.2 LBS | OXYGEN SATURATION: 97 % | HEART RATE: 105 BPM | BODY MASS INDEX: 30.91 KG/M2 | HEIGHT: 72 IN | SYSTOLIC BLOOD PRESSURE: 118 MMHG

## 2022-02-18 DIAGNOSIS — E78.49 OTHER HYPERLIPIDEMIA: ICD-10-CM

## 2022-02-18 DIAGNOSIS — E83.110 HEREDITARY HEMOCHROMATOSIS (HCC): ICD-10-CM

## 2022-02-18 DIAGNOSIS — E11.9 TYPE 2 DIABETES MELLITUS WITHOUT COMPLICATION, WITHOUT LONG-TERM CURRENT USE OF INSULIN (HCC): Primary | ICD-10-CM

## 2022-02-18 DIAGNOSIS — I10 HYPERTENSION, ESSENTIAL: ICD-10-CM

## 2022-02-18 DIAGNOSIS — E11.9 TYPE 2 DIABETES MELLITUS WITHOUT COMPLICATION, WITHOUT LONG-TERM CURRENT USE OF INSULIN (HCC): ICD-10-CM

## 2022-02-18 LAB
A/G RATIO: 2.1 (ref 1.1–2.2)
ALBUMIN SERPL-MCNC: 4.7 G/DL (ref 3.4–5)
ALP BLD-CCNC: 63 U/L (ref 40–129)
ALT SERPL-CCNC: 13 U/L (ref 10–40)
ANION GAP SERPL CALCULATED.3IONS-SCNC: 14 MMOL/L (ref 3–16)
AST SERPL-CCNC: 11 U/L (ref 15–37)
BILIRUB SERPL-MCNC: 0.5 MG/DL (ref 0–1)
BUN BLDV-MCNC: 17 MG/DL (ref 7–20)
CALCIUM SERPL-MCNC: 10 MG/DL (ref 8.3–10.6)
CHLORIDE BLD-SCNC: 105 MMOL/L (ref 99–110)
CO2: 24 MMOL/L (ref 21–32)
CREAT SERPL-MCNC: 1 MG/DL (ref 0.8–1.3)
GFR AFRICAN AMERICAN: >60
GFR NON-AFRICAN AMERICAN: >60
GLUCOSE BLD-MCNC: 171 MG/DL (ref 70–99)
POTASSIUM SERPL-SCNC: 4.7 MMOL/L (ref 3.5–5.1)
SODIUM BLD-SCNC: 143 MMOL/L (ref 136–145)
TOTAL PROTEIN: 6.9 G/DL (ref 6.4–8.2)

## 2022-02-18 PROCEDURE — 1036F TOBACCO NON-USER: CPT | Performed by: FAMILY MEDICINE

## 2022-02-18 PROCEDURE — G8427 DOCREV CUR MEDS BY ELIG CLIN: HCPCS | Performed by: FAMILY MEDICINE

## 2022-02-18 PROCEDURE — 99213 OFFICE O/P EST LOW 20 MIN: CPT | Performed by: FAMILY MEDICINE

## 2022-02-18 PROCEDURE — 2022F DILAT RTA XM EVC RTNOPTHY: CPT | Performed by: FAMILY MEDICINE

## 2022-02-18 PROCEDURE — G8417 CALC BMI ABV UP PARAM F/U: HCPCS | Performed by: FAMILY MEDICINE

## 2022-02-18 PROCEDURE — 4040F PNEUMOC VAC/ADMIN/RCVD: CPT | Performed by: FAMILY MEDICINE

## 2022-02-18 PROCEDURE — 3046F HEMOGLOBIN A1C LEVEL >9.0%: CPT | Performed by: FAMILY MEDICINE

## 2022-02-18 PROCEDURE — 1123F ACP DISCUSS/DSCN MKR DOCD: CPT | Performed by: FAMILY MEDICINE

## 2022-02-18 PROCEDURE — 3017F COLORECTAL CA SCREEN DOC REV: CPT | Performed by: FAMILY MEDICINE

## 2022-02-18 PROCEDURE — G8484 FLU IMMUNIZE NO ADMIN: HCPCS | Performed by: FAMILY MEDICINE

## 2022-02-18 ASSESSMENT — ENCOUNTER SYMPTOMS
BLOOD IN STOOL: 0
SHORTNESS OF BREATH: 0
TROUBLE SWALLOWING: 0
CHEST TIGHTNESS: 0
EYE PAIN: 0
WHEEZING: 0
NAUSEA: 0
ABDOMINAL PAIN: 0
DIARRHEA: 0
VOMITING: 0

## 2022-02-18 NOTE — PROGRESS NOTES
2/18/2022    Lise Jarrett    Chief Complaint   Patient presents with    3 Month Follow-Up     4 mo/no complaints       HPI  History was obtained from the patient. Ruiz Vigil is a 66 y.o. male who presents today with follow-up on diabetes mellitus type 2, hypertension, polycythemia, fatigue, hemochromatosis, and hyperlipidemia. Patient remains active and pleasant no acute issues or changes. Does have diabetic foot exam done regularly per podiatry last done in October 2021. Her sugars have been reasonable at home weight remains stable gets a moderate amount of exercise does follow closely with hematology for hemochromatosis and polycythemia. Meds overall are tolerated. Mood is bright currently off medication. No recent falls. REVIEW OF SYMPTOMS    Review of Systems   Constitutional: Positive for fatigue. Negative for activity change. HENT: Negative for congestion, hearing loss, mouth sores and trouble swallowing. Eyes: Negative for pain and visual disturbance. Respiratory: Negative for chest tightness, shortness of breath and wheezing. Cardiovascular: Negative for chest pain and palpitations. Gastrointestinal: Negative for abdominal pain, blood in stool, diarrhea, nausea and vomiting. Endocrine: Negative for cold intolerance, polydipsia and polyuria. Genitourinary: Negative for dysuria, frequency and urgency. Musculoskeletal: Negative for arthralgias, gait problem and neck stiffness. Skin: Negative for rash. Allergic/Immunologic: Negative for environmental allergies. Neurological: Negative for dizziness, seizures, speech difficulty and weakness. Hematological: Does not bruise/bleed easily. Psychiatric/Behavioral: Positive for dysphoric mood. Negative for agitation, confusion, hallucinations, self-injury and suicidal ideas. The patient is not nervous/anxious.         PAST MEDICAL HISTORY  Past Medical History:   Diagnosis Date    Benign prostatic hyperplasia     DVT (deep venous thrombosis) ([address]     Hemochromatosis     History of pulmonary embolism     Hyperlipidemia     Hypertension, essential     Kidney stone     in er for kidney stones(1/14/13), had Leni, saw Dr Cara Cassidy, did pass one\"    Major depressive disorder     Mantoux: positive     Polycythemia vera(238.4) dx late 1980s    \"per wife-(1/18/13) \"according to Dr Yaa Rutledge he does not have this but he does have high iron\"    Pulmonary embolism [facility])     old chart gives hx brant PE with right lower DVT 7/2012(pc)(had scope left knee 4/2012)- had Filter placement done 7/31/2012    Tinnitus     Type 2 diabetes mellitus ([address]     dx 2003       FAMILY HISTORY  Family History   Problem Relation Age of Onset    Arthritis Mother     Cancer Mother         kidney cancer    Miscarriages / Stillbirths Mother     Stroke Mother     Heart Disease Father         MI    High Cholesterol Father     Kidney Disease Brother     Arthritis Sister     Mental Illness Sister     Bipolar Disorder Sister     No Known Problems Sister     Heart Disease Son     Stroke Son         age 32       SOCIAL HISTORY  Social History     Socioeconomic History    Marital status:      Spouse name: None    Number of children: None    Years of education: None    Highest education level: None   Occupational History    None   Tobacco Use    Smoking status: Never Smoker    Smokeless tobacco: Never Used   Vaping Use    Vaping Use: Never used   Substance and Sexual Activity    Alcohol use: No    Drug use: No    Sexual activity: None   Other Topics Concern    None   Social History Narrative    None     Social Determinants of Health     Financial Resource Strain: Low Risk     Difficulty of Paying Living Expenses: Not hard at all   Food Insecurity: No Food Insecurity    Worried About Running Out of Food in the Last Year: Never true    Rafa of Food in the Last Year: Never true   Transportation Needs:     Lack of Transportation (Medical): TABS IN THE EVENING WITH MEALS 450 tablet 1    Multiple Vitamins-Minerals (THERAPEUTIC MULTIVITAMIN-MINERALS) tablet Take 1 tablet by mouth daily      COMBIGAN 0.2-0.5 % ophthalmic solution       LATANOPROST OP Apply to eye      Probiotic Product (PROBIOTIC DAILY PO) Take by mouth      niacinamide 500 MG tablet Take 500 mg by mouth 2 times daily (with meals)      PANTOTHENIC ACID PO Take 500 mg by mouth daily.  Multiple Vitamins-Minerals (OCUVITE) TABS oral tablet Take 1 tablet by mouth daily.  vitamin D3 (CHOLECALCIFEROL) 400 units TABS tablet Take by mouth daily        No current facility-administered medications for this visit. ALLERGIES  Allergies   Allergen Reactions    Bee Pollen     Pollen Extract     Tree Extract     Vicodin [Hydrocodone-Acetaminophen] Nausea And Vomiting       PHYSICAL EXAM    /78 (Site: Right Upper Arm, Position: Sitting, Cuff Size: Medium Adult)   Pulse 105   Ht 6' (1.829 m)   Wt 228 lb 3.2 oz (103.5 kg)   SpO2 97%   BMI 30.95 kg/m²     Physical Exam  Vitals and nursing note reviewed. Constitutional:       General: He is not in acute distress. Appearance: He is well-developed. He is not ill-appearing or toxic-appearing. HENT:      Head: Normocephalic and atraumatic. Nose: Nose normal.      Mouth/Throat:      Mouth: Mucous membranes are moist.      Pharynx: Oropharynx is clear. Eyes:      Pupils: Pupils are equal, round, and reactive to light. Cardiovascular:      Rate and Rhythm: Normal rate and regular rhythm. Heart sounds: Normal heart sounds. No murmur heard. No gallop. Pulmonary:      Effort: Pulmonary effort is normal. No respiratory distress. Breath sounds: Normal breath sounds. No wheezing, rhonchi or rales. Abdominal:      Palpations: Abdomen is soft. Musculoskeletal:         General: No swelling or deformity. Normal range of motion. Cervical back: Normal range of motion and neck supple. No rigidity. Lymphadenopathy:      Cervical: No cervical adenopathy. Skin:     General: Skin is warm and dry. Coloration: Skin is not jaundiced. Findings: No bruising. Neurological:      General: No focal deficit present. Mental Status: He is alert and oriented to person, place, and time. Mental status is at baseline. Cranial Nerves: No cranial nerve deficit. Motor: No weakness. Psychiatric:         Mood and Affect: Mood normal.         Behavior: Behavior normal.         Thought Content: Thought content normal.         ASSESSMENT & PLAN     Diagnosis Orders   1. Type 2 diabetes mellitus without complication, without long-term current use of insulin ([facility])  Hemoglobin A1C    Comprehensive Metabolic Panel   2. Hypertension, essential  Comprehensive Metabolic Panel   3. Hereditary hemochromatosis ([facility] [address]     4. Other hyperlipidemia     Would keep him on same regimen. Encouraged to continue to work on healthy lifestyle with regular exercise watch blood sugars closely and monitor. Check A1c and a CMP and have him follow-up for these results. Please note he has had his COVID shots x2 and COVID booster and has had a high-dose flu shot. Return in about 4 months (around 6/18/2022).          Electronically signed by Brendan Harrell MD on 2/18/2022

## 2022-02-19 LAB
ESTIMATED AVERAGE GLUCOSE: 139.9 MG/DL
HBA1C MFR BLD: 6.5 %

## 2022-05-11 NOTE — PROGRESS NOTES
Patient Name: Lyndsey Sánchez  Patient : 1950  Patient MRN: <[id number]>     Primary Oncologist: Clint Macdonald MD  Referring Provider: Panfilo Vasquez MD     Date of Service: 6/10/2022      Chief Complaint:   No chief complaint on file. He came in for follow-up visit. Patient Active Problem List:     DM (diabetes mellitus)     BPH (benign prostatic hyperplasia)     Polycythemia     DVT (deep venous thrombosis)-R leg     Diarrhea     Pre-syncope     Type 2 diabetes mellitus (HCC)     Hypertension, essential     Hemochromatosis     Tinnitus     Major depressive disorder     Benign prostatic hyperplasia     History of pulmonary embolism     Mantoux: positive     Other specified glaucoma     Chronic fatigue     Functional diarrhea     HPI:   Ladarius Stokes is a pleasant 19-year-old male patient who had bilateral pulmonary embolism and right lower extremity DVT in 2012. He had arthroscopic left knee surgery in 2012 and a history of spermatocele surgery in 2012 by Dr. Erika Taveras. These two surgeries resulted in inactivity. He believed that this was the trigger factor for his DVT and PE. He denied any travel history or prior history of blood clots. He had an episode of chest pain and dyspnea on 2012, and went for a CT chest at [facility], showing prominent bilateral pulmonary embolism. Venous duplex study of the right lower extremity, due to the leg pain and swelling, showed DVT. He was then hospitalized for further management and started on anticoagulation. He had a Günther Tulip filter placement on 2012, and initially was offered thrombolytic treatment, which he refused. Blood test showed negative JAK2 study. D-dimer was less than 200. He had heterozygous H63D hemochromatosis. Venous Doppler study in 2012 showed no evidence for DVT.     He was seen by Dr. Stephan Palmer who attempted to remove the IVC filter though due to the possibility of causing more problems by removal of the IVC filter, the procedure was cancelled. He eventually stopped his coumadin. Iron study on February 20, 2013 showed ferritin 78, iron 84, TIBC 348, transferrin saturation 24. White cell count was 5.5, hemoglobin 16.1, hematocrit 47.4, and platelet 140. Blood test on August 21, 2013 showed ferritin at 108, iron 169, iron-binding 327, transferrin saturation 35 percent. White cell count was 11.7, hemoglobin 16.6, hematocrit 47.4, platelet 686. He had a kidney stone removed in July 2013 by Dr. Xiomy Lindquist. Colonoscopy in September 2012 by Dr. Aura Padilla showed no polyps. He was in the hospital in September 2014. CT chest showed lung nodule and no PE. Blood tests in November 2014 showed normal CBCD. Iron was 97, TIBC 337, ferritin 67. CT chest in January 2015 showed stable lung nodule. Blood test in November 2015 showed normal CBC. Ferritin was 48. Reportedly, CAT scan of the chest in early 2016 revealed stable lung nodule and Dr. Leann Albright plans to have a followup CAT scan in 18 months. Blood test in November 2016 revealed stable CBC, with white cell 6.3, hemoglobin 14.1, hematocrit 42.6, platelet 574. Ferritin was 78. He had colonoscopy at the age of 71 in 200. Follow-up colonoscopy in 10 years was recommended. He had skin cancer removed from the right ear by Dr. Anabel Chahal and lesion to the left temple was frozen. He has been taking vitamin B3 500 mg twice a day prescribed by Dr. Anabel Chahal. CT of the chest in October 2017 revealed stable upper lobe nodule compatible with benign granuloma. No further follow up was necessary. Labs in November 2019 were reviewed. He does not need any therapeutic phlebotomy. CBC in November 2020 was grossly unremarkable. Ferritin was 50. He has been having watery diarrhea since January 2020. He had colonoscopy in February and May 2020. He had upper endoscopic study in November 2020. Reportedly he has gastritis.   Small bowel follow-through study in March 2020 was unremarkable. C. difficile stool ova and parasite in January 2020 were negative. He was told that he has small bowel small intestine bacterial overgrowth. He is taking probiotic. At one time he was  on antibiotics. In October the diarrhea has improved but he still has intermittent watery stool. He will follow up with Dr. Dusty Sommer on December 18, 2020. Diarrhea has resolved at this time. Reportedly GI ordered work-up which was negative for potential neuroendocrine tumor. 12/2021 ferritin was 38, TIBC 375. CBC was unremarkable. CMP in June 2021 was grossly unremarkable. He only has heterozygous H63D mutation. Reportedly he had colonoscopy in early 2020, EGD in late 2020 and stool occult in summer 2020. Follow-up colonoscopy in 5 years was recommended. He may take iron pill every other day. On 6/10/2022 he came in for follow up visit. CBC om June 2022 was grossly unremarkable. Ferritin was 76. FOB in May 2022 was negative. He has skin lesion frozen to left frontal head by Dr James Bah recently. Reportedly he has precancerous skin lesion. I recommend to use sunscreen when he works outside. He will follow-up again with dermatologist in 6 months. He denied signs and symptoms to suggest recurrent blood clot. He has chronic right lower extremity swelling and I recommend to wear compression stocking. No acute pain. Denied any nausea, vomiting or diarrhea. No fever or chills. No chest pain, shortness of breath or palpitation. No headache or dizzy spell. No specific bone pain. No melena or hematochezia. Denied any dysuria or hematuria. PAST MEDICAL HISTORY:  Polycythemia in 1989, hypertension, diabetes, arthroscopic left knee surgery in April of 2012, spermatocele surgery in June of 2012, DVT and pulmonary embolism in July of 2012. He was diagnosed with skin cancer in his right ear, which was removed by Dr. James Bah.         FAMILY HISTORY:  Mother had kidney tumor, father had heart disease. SOCIAL HISTORY:  He denied any history of smoking or alcohol. He is  and has four children. He is retired. One of his sons was diagnosed with aortic stenosis and has a history of stroke. LABORATORY STUDIES:  February 13, 2013:  Ferritin was 78, iron 84, TIBC 348, transferrin saturation 24 percent, WBC 5.5, hemoglobin 16.1, and platelet 509,605. Review of Systems: \"Per interval history; otherwise 10 point ROS is negative. \"     Vital Signs: There were no vitals taken for this visit. Physical Exam:  CONSTITUTIONAL: awake, alert, cooperative, no apparent distress   EYES: pupils equal, round and reactive to light, sclera clear and conjunctiva normal  ENT: Normocephalic, without obvious abnormality, atraumatic  NECK: supple, symmetrical, no jugular venous distension and no carotid bruits   HEMATOLOGIC/LYMPHATIC: no cervical, supraclavicular or axillary lymphadenopathy   LUNGS: no increased work of breathing and clear to auscultation   CARDIOVASCULAR: regular rate and rhythm, normal S1 and S2, no murmur  ABDOMEN: normal bowel sound, soft, non-distended, non-tender, no masses palpated, no hepatosplenomegaly   MUSCULOSKELETAL: full range of motion noted, tone is normal  NEUROLOGIC: Motor skills grossly intact. CN II - XII grossly intact. SKIN: Normal skin color, texture, turgor and no jaundice. appears intact   EXTREMITIES: no LE edema. No cyanosis. Homans signs negative.     Labs:  Hematology:  Lab Results   Component Value Date    WBC 5.8 12/03/2021    RBC 4.97 12/03/2021    HGB 15.9 12/03/2021    HCT 47.1 12/03/2021    MCV 94.8 12/03/2021    MCH 32.0 (H) 12/03/2021    MCHC 33.8 12/03/2021    RDW 14.9 12/03/2021     12/03/2021    MPV 8.5 12/03/2021    SEGSPCT 52.7 12/03/2021    EOSRELPCT 1.9 12/03/2021    BASOPCT 0.3 12/03/2021    LYMPHOPCT 35.6 12/03/2021    MONOPCT 9.5 (H) 12/03/2021    SEGSABS 3.0 12/03/2021    EOSABS 0.1 12/03/2021    BASOSABS 0.0 12/03/2021    LYMPHSABS 2.1 12/03/2021    MONOSABS 0.6 12/03/2021    DIFFTYPE AUTOMATED DIFFERENTIAL 12/03/2021     Chemistry:  Lab Results   Component Value Date     02/18/2022    K 4.7 02/18/2022     02/18/2022    CO2 24 02/18/2022    BUN 17 02/18/2022    CREATININE 1.0 02/18/2022    GLUCOSE 171 (H) 02/18/2022    CALCIUM 10.0 02/18/2022    PROT 6.9 02/18/2022    LABALBU 4.7 02/18/2022    BILITOT 0.5 02/18/2022    ALKPHOS 63 02/18/2022    AST 11 (L) 02/18/2022    ALT 13 02/18/2022    LABGLOM >60 02/18/2022    GFRAA >60 02/18/2022    AGRATIO 2.1 02/18/2022    GLOB 2.1 06/17/2021    POCGLU 248 (H) 08/23/2014     Lab Results   Component Value Date    HOMOCYSTEINE 8.8 07/31/2012     Immunology:  Lab Results   Component Value Date    PROT 6.9 02/18/2022     Coagulation Panel:  Lab Results   Component Value Date    PROTIME 10.3 08/22/2014    INR 0.95 08/22/2014    APTT 25.5 08/22/2014    DDIMER <200 12/27/2012     Observations:  No data recorded      Assessment & Plan:  1. He had a history of right lower extremity DVT and bilateral PE, likely provoked by the prior surgery in July 2012, status post IVC filter. He was treated with anticoagulation, including Coumadin. He is currently not on  anticoagulation. He denied symptoms of recurrent blood clot. 2. He has H63D heterozygous. Labs in November 2019 were reviewed. CBC and iron study in November 2020 were reviewed. Recent labs in June 2020 were reviewed. He is not taking any iron supplement. 3. He has negative JAK2.     4. Reportedly, CAT scan of the chest in early 2015 showed stable lung nodule. Followup CAT scans in October 2017 revealed stable right upper lung nodule, consistent with benign granuloma. No followup study necessary, as per the radiologist.      We discussed about healthy diet and life style. RTC in 6 months follow up blood tests, including iron study. All of his questions have been answered for today.       Recent imaging and

## 2022-05-27 DIAGNOSIS — E03.8 OTHER SPECIFIED HYPOTHYROIDISM: Primary | ICD-10-CM

## 2022-05-27 DIAGNOSIS — E03.8 OTHER SPECIFIED HYPOTHYROIDISM: ICD-10-CM

## 2022-05-27 LAB
T4 FREE: 1.3 NG/DL (ref 0.9–1.8)
TSH REFLEX: 3.71 UIU/ML (ref 0.27–4.2)

## 2022-06-03 ENCOUNTER — TELEPHONE (OUTPATIENT)
Dept: FAMILY MEDICINE CLINIC | Age: 72
End: 2022-06-03

## 2022-06-03 ENCOUNTER — HOSPITAL ENCOUNTER (OUTPATIENT)
Dept: INFUSION THERAPY | Age: 72
Discharge: HOME | End: 2022-06-03
Payer: MEDICARE

## 2022-06-03 ENCOUNTER — HOSPITAL ENCOUNTER (OUTPATIENT)
Age: 72
Setting detail: SPECIMEN
Discharge: HOME | End: 2022-06-03
Payer: MEDICARE

## 2022-06-03 DIAGNOSIS — Z86.718 HISTORY OF DVT (DEEP VEIN THROMBOSIS): ICD-10-CM

## 2022-06-03 DIAGNOSIS — E61.1 LOW IRON: ICD-10-CM

## 2022-06-03 LAB
BASOPHILS ABSOLUTE: 0 K/CU MM
BASOPHILS RELATIVE PERCENT: 0.3 % (ref 0–1)
DIFFERENTIAL TYPE: ABNORMAL
EOSINOPHILS ABSOLUTE: 0.1 K/CU MM
EOSINOPHILS RELATIVE PERCENT: 1.5 % (ref 0–3)
FERRITIN: 76 NG/ML (ref 30–400)
HCT VFR BLD CALC: 49.1 % (ref 42–52)
HEMOGLOBIN: 16.4 GM/DL (ref 13.5–18)
IRON: 107 UG/DL (ref 59–158)
LYMPHOCYTES ABSOLUTE: 1.9 K/CU MM
LYMPHOCYTES RELATIVE PERCENT: 28 % (ref 24–44)
MCH RBC QN AUTO: 31.7 PG (ref 27–31)
MCHC RBC AUTO-ENTMCNC: 33.4 % (ref 32–36)
MCV RBC AUTO: 95 FL (ref 78–100)
MONOCYTES ABSOLUTE: 0.6 K/CU MM
MONOCYTES RELATIVE PERCENT: 9 % (ref 0–4)
PCT TRANSFERRIN: 30 % (ref 10–44)
PDW BLD-RTO: 14.4 % (ref 11.7–14.9)
PLATELET # BLD: 250 K/CU MM (ref 140–440)
PMV BLD AUTO: 8.5 FL (ref 7.5–11.1)
RBC # BLD: 5.17 M/CU MM (ref 4.6–6.2)
SEGMENTED NEUTROPHILS ABSOLUTE COUNT: 4.2 K/CU MM
SEGMENTED NEUTROPHILS RELATIVE PERCENT: 61.2 % (ref 36–66)
TOTAL IRON BINDING CAPACITY: 361 UG/DL (ref 250–450)
UNSATURATED IRON BINDING CAPACITY: 254 UG/DL (ref 110–370)
WBC # BLD: 6.8 K/CU MM (ref 4–10.5)

## 2022-06-03 PROCEDURE — 83550 IRON BINDING TEST: CPT

## 2022-06-03 PROCEDURE — 82270 OCCULT BLOOD FECES: CPT

## 2022-06-03 PROCEDURE — 82728 ASSAY OF FERRITIN: CPT

## 2022-06-03 PROCEDURE — 36415 COLL VENOUS BLD VENIPUNCTURE: CPT

## 2022-06-03 PROCEDURE — 83540 ASSAY OF IRON: CPT

## 2022-06-03 PROCEDURE — 85025 COMPLETE CBC W/AUTO DIFF WBC: CPT

## 2022-06-03 NOTE — TELEPHONE ENCOUNTER
Patient called and stated that he does NOT need a call back.  Patient has found the answer to his question

## 2022-06-06 LAB — HEMOCCULT SP1 STL QL: NEGATIVE

## 2022-06-09 DIAGNOSIS — E78.49 OTHER HYPERLIPIDEMIA: ICD-10-CM

## 2022-06-09 DIAGNOSIS — E11.9 TYPE 2 DIABETES MELLITUS WITHOUT COMPLICATION, WITHOUT LONG-TERM CURRENT USE OF INSULIN (HCC): ICD-10-CM

## 2022-06-10 ENCOUNTER — HOSPITAL ENCOUNTER (OUTPATIENT)
Dept: INFUSION THERAPY | Age: 72
Discharge: HOME | End: 2022-06-10

## 2022-06-10 ENCOUNTER — OFFICE VISIT (OUTPATIENT)
Dept: ONCOLOGY | Age: 72
End: 2022-06-10
Payer: MEDICARE

## 2022-06-10 VITALS
OXYGEN SATURATION: 96 % | RESPIRATION RATE: 16 BRPM | SYSTOLIC BLOOD PRESSURE: 127 MMHG | BODY MASS INDEX: 30.5 KG/M2 | HEART RATE: 92 BPM | DIASTOLIC BLOOD PRESSURE: 72 MMHG | HEIGHT: 72 IN | TEMPERATURE: 97.9 F | WEIGHT: 225.2 LBS

## 2022-06-10 DIAGNOSIS — Z86.718 HISTORY OF DVT (DEEP VEIN THROMBOSIS): Primary | ICD-10-CM

## 2022-06-10 PROCEDURE — 1036F TOBACCO NON-USER: CPT | Performed by: INTERNAL MEDICINE

## 2022-06-10 PROCEDURE — 99213 OFFICE O/P EST LOW 20 MIN: CPT | Performed by: INTERNAL MEDICINE

## 2022-06-10 PROCEDURE — G8427 DOCREV CUR MEDS BY ELIG CLIN: HCPCS | Performed by: INTERNAL MEDICINE

## 2022-06-10 PROCEDURE — 3017F COLORECTAL CA SCREEN DOC REV: CPT | Performed by: INTERNAL MEDICINE

## 2022-06-10 PROCEDURE — 1123F ACP DISCUSS/DSCN MKR DOCD: CPT | Performed by: INTERNAL MEDICINE

## 2022-06-10 PROCEDURE — G8417 CALC BMI ABV UP PARAM F/U: HCPCS | Performed by: INTERNAL MEDICINE

## 2022-06-10 RX ORDER — ATORVASTATIN CALCIUM 20 MG/1
TABLET, FILM COATED ORAL
Qty: 90 TABLET | Refills: 1 | Status: SHIPPED | OUTPATIENT
Start: 2022-06-10

## 2022-06-10 RX ORDER — GLIPIZIDE 2.5 MG/1
2.5 TABLET, EXTENDED RELEASE ORAL DAILY
Qty: 90 TABLET | Refills: 1 | Status: SHIPPED | OUTPATIENT
Start: 2022-06-10 | End: 2022-10-26

## 2022-06-10 ASSESSMENT — PATIENT HEALTH QUESTIONNAIRE - PHQ9
SUM OF ALL RESPONSES TO PHQ QUESTIONS 1-9: 0
SUM OF ALL RESPONSES TO PHQ QUESTIONS 1-9: 0
2. FEELING DOWN, DEPRESSED OR HOPELESS: 0
SUM OF ALL RESPONSES TO PHQ QUESTIONS 1-9: 0
SUM OF ALL RESPONSES TO PHQ QUESTIONS 1-9: 0
1. LITTLE INTEREST OR PLEASURE IN DOING THINGS: 0
SUM OF ALL RESPONSES TO PHQ9 QUESTIONS 1 & 2: 0

## 2022-06-10 NOTE — PROGRESS NOTES
MA Rooming Questions  Patient: Renato Wilder  MRN: [id number]    Date: 6/10/2022        1. Do you have any new issues?   no         2. Do you need any refills on medications?    no    3. Have you had any imaging done since your last visit?   no    4. Have you been hospitalized or seen in the emergency room since your last visit here?   no    5. Did the patient have a depression screening completed today?  Yes    PHQ-9 Total Score: 0 (6/10/2022  9:13 AM)       PHQ-9 Given to (if applicable):               PHQ-9 Score (if applicable):                     [] Positive     []  Negative              Does question #9 need addressed (if applicable)                     [] Yes    []  No               Edward Wong CMA

## 2022-06-22 ENCOUNTER — OFFICE VISIT (OUTPATIENT)
Dept: FAMILY MEDICINE CLINIC | Age: 72
End: 2022-06-22
Payer: MEDICARE

## 2022-06-22 VITALS
WEIGHT: 222.8 LBS | DIASTOLIC BLOOD PRESSURE: 70 MMHG | RESPIRATION RATE: 24 BRPM | HEIGHT: 72 IN | HEART RATE: 93 BPM | BODY MASS INDEX: 30.18 KG/M2 | SYSTOLIC BLOOD PRESSURE: 124 MMHG | OXYGEN SATURATION: 97 %

## 2022-06-22 DIAGNOSIS — E78.49 OTHER HYPERLIPIDEMIA: ICD-10-CM

## 2022-06-22 DIAGNOSIS — E11.9 TYPE 2 DIABETES MELLITUS WITHOUT COMPLICATION, WITHOUT LONG-TERM CURRENT USE OF INSULIN (HCC): ICD-10-CM

## 2022-06-22 DIAGNOSIS — E83.110 HEREDITARY HEMOCHROMATOSIS (HCC): ICD-10-CM

## 2022-06-22 DIAGNOSIS — I10 HYPERTENSION, ESSENTIAL: ICD-10-CM

## 2022-06-22 DIAGNOSIS — E11.9 TYPE 2 DIABETES MELLITUS WITHOUT COMPLICATION, WITHOUT LONG-TERM CURRENT USE OF INSULIN (HCC): Primary | ICD-10-CM

## 2022-06-22 LAB
CHOLESTEROL, TOTAL: 153 MG/DL (ref 0–199)
HDLC SERPL-MCNC: 55 MG/DL (ref 40–60)
LDL CHOLESTEROL CALCULATED: 76 MG/DL
TRIGL SERPL-MCNC: 111 MG/DL (ref 0–150)
VLDLC SERPL CALC-MCNC: 22 MG/DL

## 2022-06-22 PROCEDURE — 3017F COLORECTAL CA SCREEN DOC REV: CPT | Performed by: FAMILY MEDICINE

## 2022-06-22 PROCEDURE — 99213 OFFICE O/P EST LOW 20 MIN: CPT | Performed by: FAMILY MEDICINE

## 2022-06-22 PROCEDURE — G8417 CALC BMI ABV UP PARAM F/U: HCPCS | Performed by: FAMILY MEDICINE

## 2022-06-22 PROCEDURE — 3044F HG A1C LEVEL LT 7.0%: CPT | Performed by: FAMILY MEDICINE

## 2022-06-22 PROCEDURE — 1123F ACP DISCUSS/DSCN MKR DOCD: CPT | Performed by: FAMILY MEDICINE

## 2022-06-22 PROCEDURE — 1036F TOBACCO NON-USER: CPT | Performed by: FAMILY MEDICINE

## 2022-06-22 PROCEDURE — 2022F DILAT RTA XM EVC RTNOPTHY: CPT | Performed by: FAMILY MEDICINE

## 2022-06-22 PROCEDURE — G8427 DOCREV CUR MEDS BY ELIG CLIN: HCPCS | Performed by: FAMILY MEDICINE

## 2022-06-22 RX ORDER — DILTIAZEM HYDROCHLORIDE 300 MG/1
300 CAPSULE, COATED, EXTENDED RELEASE ORAL DAILY
Qty: 90 CAPSULE | Refills: 1 | Status: SHIPPED | OUTPATIENT
Start: 2022-06-22

## 2022-06-22 RX ORDER — PIOGLITAZONEHYDROCHLORIDE 30 MG/1
30 TABLET ORAL DAILY
Qty: 90 TABLET | Refills: 1 | Status: SHIPPED | OUTPATIENT
Start: 2022-06-22

## 2022-06-22 ASSESSMENT — ENCOUNTER SYMPTOMS
BLOOD IN STOOL: 0
NAUSEA: 0
CHEST TIGHTNESS: 0
EYE PAIN: 0
WHEEZING: 0
VOMITING: 0
SHORTNESS OF BREATH: 0
ABDOMINAL PAIN: 0
TROUBLE SWALLOWING: 0
DIARRHEA: 0

## 2022-06-22 NOTE — PROGRESS NOTES
6/22/2022    Eunice Stephen    Chief Complaint   Patient presents with    3 Month Follow-Up    Immunizations     Discuss shingles and pneumonia vaccines. Would like to make sure he is up to date.  Other     When out in heat and he has been standing for a while(15 min), when he tries to walk again he gets lightheaded & dizzy. HPI  History was obtained from the patient. Rosalino Burleson is a 67 y.o. male who presents today with routine follow-up. He does have a history of hypertension diabetes mellitus type 2 as well as hyperlipidemia and hemochromatosis followed by hematology. Patient's current meds are well-tolerated. He does remain active. No recent chest pain reported mood is fairly positive and meds are tolerated he denies shortness of breath or chest pain. Labs done in May look quite good-thyroid labs and CBC as well as CMP from earlier in the year were fine. Last A1c was 6.5% this needs to be repeated. His current immunizations including COVID shots, pneumonia shots, and single shots appear up-to-date. REVIEW OF SYMPTOMS    Review of Systems   Constitutional: Negative for activity change and fatigue. HENT: Negative for congestion, hearing loss, mouth sores and trouble swallowing. Eyes: Negative for pain and visual disturbance. Respiratory: Negative for chest tightness, shortness of breath and wheezing. Cardiovascular: Negative for chest pain and palpitations. Gastrointestinal: Negative for abdominal pain, blood in stool, diarrhea, nausea and vomiting. Endocrine: Negative for cold intolerance, polydipsia and polyuria. Genitourinary: Negative for dysuria, frequency and urgency. Musculoskeletal: Negative for arthralgias, gait problem and neck stiffness. Skin: Negative for rash. Allergic/Immunologic: Negative for environmental allergies. Neurological: Negative for dizziness, seizures, speech difficulty and weakness. Hematological: Does not bruise/bleed easily. Psychiatric/Behavioral: Negative for agitation, confusion, hallucinations, self-injury and suicidal ideas.        PAST MEDICAL HISTORY  Past Medical History:   Diagnosis Date    Benign prostatic hyperplasia     DVT (deep venous thrombosis) (HCC)     Hemochromatosis     History of pulmonary embolism     Hyperlipidemia     Hypertension, essential     Kidney stone     in er for kidney stones(1/14/13), had Dilaudid, saw Dr Naveen De Anda, did pass one\"    Major depressive disorder     Mantoux: positive     Polycythemia vera(238.4) dx late 1980s    \"per wife-(1/18/13) \"according to Dr Mckenna Lou he does not have this but he does have high iron\"    Pulmonary embolism [facility])     old chart gives hx brant PE with right lower DVT 7/2012(pc)(had scope left knee 4/2012)- had Filter placement done 7/31/2012    Tinnitus     Type 2 diabetes mellitus ([facility] [address]     dx 2003       FAMILY HISTORY  Family History   Problem Relation Age of Onset    Arthritis Mother     Cancer Mother         kidney cancer    Miscarriages / Stillbirths Mother     Stroke Mother     Heart Disease Father         MI    High Cholesterol Father     Kidney Disease Brother     Arthritis Sister     Mental Illness Sister     Bipolar Disorder Sister     No Known Problems Sister     Heart Disease Son     Stroke Son         age 32       SOCIAL HISTORY  Social History     Socioeconomic History    Marital status:      Spouse name: Not on file    Number of children: Not on file    Years of education: Not on file    Highest education level: Not on file   Occupational History    Not on file   Tobacco Use    Smoking status: Never Smoker    Smokeless tobacco: Never Used   Vaping Use    Vaping Use: Never used   Substance and Sexual Activity    Alcohol use: No    Drug use: No    Sexual activity: Not on file   Other Topics Concern    Not on file   Social History Narrative    Not on file     Social Determinants of Health     Financial Resource Strain: Low Risk     Difficulty of Paying Living Expenses: Not hard at all   Food Insecurity: No Food Insecurity    Worried About Running Out of Food in the Last Year: Never true    Rafa of Food in the Last Year: Never true   Transportation Needs:     Lack of Transportation (Medical): Not on file    Lack of Transportation (Non-Medical):  Not on file   Physical Activity:     Days of Exercise per Week: Not on file    Minutes of Exercise per Session: Not on file   Stress:     Feeling of Stress : Not on file   Social Connections:     Frequency of Communication with Friends and Family: Not on file    Frequency of Social Gatherings with Friends and Family: Not on file    Attends Sabianist Services: Not on file    Active Member of [address] [place] [address] [employer] or Organizations: Not on file    Attends Club or Organization Meetings: Not on file    Marital Status: Not on file   Intimate Partner Violence:     Fear of Current or Ex-Partner: Not on file    Emotionally Abused: Not on file    Physically Abused: Not on file    Sexually Abused: Not on file   Housing Stability:     Unable to Pay for Housing in the Last Year: Not on file    Number of [place] in the Last Year: Not on file    Unstable Housing in the Last Year: Not on file        SURGICAL HISTORY  Past Surgical History:   Procedure Laterality Date    COLONOSCOPY      KNEE ARTHROSCOPY Right 04/2012    LITHOTRIPSY      SKIN BIOPSY  June 2012    [address]  Current Outpatient Medications   Medication Sig Dispense Refill    dilTIAZem (CARTIA XT) 300 MG extended release capsule Take 1 capsule by mouth daily 90 capsule 1    pioglitazone (ACTOS) 30 MG tablet Take 1 tablet by mouth daily 90 tablet 1    glipiZIDE (GLUCOTROL XL) 2.5 MG extended release tablet Take 1 tablet by mouth daily 90 tablet 1    atorvastatin (LIPITOR) 20 MG tablet TAKE 1 TABLET BY MOUTH ONE TIME A DAY 90 tablet 1    metFORMIN (GLUCOPHAGE) 500 MG tablet TAKE 2 TABS BY MOUTH IN THE MORNING, 1 TAB AT NOON, AND 2 TABS IN THE EVENING WITH MEALS 450 tablet 0    dapagliflozin (FARXIGA) 10 MG tablet Take 1 tablet by mouth every morning 30 tablet 5    lisinopril (PRINIVIL;ZESTRIL) 5 MG tablet TAKE 1 TABLET BY MOUTH EVERY DAY 90 tablet 1    Multiple Vitamins-Minerals (THERAPEUTIC MULTIVITAMIN-MINERALS) tablet Take 1 tablet by mouth daily      COMBIGAN 0.2-0.5 % ophthalmic solution       LATANOPROST OP Apply to eye      Probiotic Product (PROBIOTIC DAILY PO) Take by mouth      niacinamide 500 MG tablet Take 500 mg by mouth 2 times daily (with meals)      PANTOTHENIC ACID PO Take 500 mg by mouth daily.  Multiple Vitamins-Minerals (OCUVITE) TABS oral tablet Take 1 tablet by mouth daily.  vitamin D3 (CHOLECALCIFEROL) 400 units TABS tablet Take by mouth daily Patient states taking 1000 units daily. No current facility-administered medications for this visit. ALLERGIES  Allergies   Allergen Reactions    Pollen Extract     Tree Extract     Vicodin [Hydrocodone-Acetaminophen] Nausea And Vomiting       PHYSICAL EXAM    /70 (Site: Right Upper Arm, Position: Sitting, Cuff Size: Medium Adult)   Pulse 93   Resp 24   Ht 6' (1.829 m)   Wt 222 lb 12.8 oz (101.1 kg)   SpO2 97%   BMI 30.22 kg/m²     Physical Exam  Vitals and nursing note reviewed. Constitutional:       General: He is not in acute distress. Appearance: He is well-developed. He is not ill-appearing. HENT:      Head: Normocephalic and atraumatic. Eyes:      Pupils: Pupils are equal, round, and reactive to light. Cardiovascular:      Rate and Rhythm: Normal rate and regular rhythm. Heart sounds: Normal heart sounds. Pulmonary:      Effort: Pulmonary effort is normal.      Breath sounds: Normal breath sounds. Abdominal:      Palpations: Abdomen is soft. Musculoskeletal:         General: Normal range of motion. Cervical back: Normal range of motion and neck supple. Skin:     General: Skin is warm and dry. Neurological:      Mental Status: He is alert and oriented to person, place, and time. Psychiatric:         Thought Content: Thought content normal.         ASSESSMENT & PLAN     Diagnosis Orders   1. Type 2 diabetes mellitus without complication, without long-term current use of insulin ([facility])  pioglitazone (ACTOS) 30 MG tablet    Hemoglobin A1C   2. Hypertension, essential  dilTIAZem (CARTIA XT) 300 MG extended release capsule   3. Other hyperlipidemia  LIPID PANEL   4. Hereditary hemochromatosis ([facility] [address]     He is to continue with healthy lifestyle & would provide refills on meds as needed check A1c and lipid panel today. Call with questions or problems. He is to keep appointments with subspecialists. Return in about 4 months (around 10/22/2022).          Electronically signed by Puma Jauregui MD on 6/22/2022

## 2022-06-23 LAB
ESTIMATED AVERAGE GLUCOSE: 139.9 MG/DL
HBA1C MFR BLD: 6.5 %

## 2022-07-21 ENCOUNTER — TELEMEDICINE (OUTPATIENT)
Dept: FAMILY MEDICINE CLINIC | Age: 72
End: 2022-07-21
Payer: MEDICARE

## 2022-07-21 DIAGNOSIS — Z00.00 MEDICARE ANNUAL WELLNESS VISIT, SUBSEQUENT: Primary | ICD-10-CM

## 2022-07-21 PROCEDURE — G0439 PPPS, SUBSEQ VISIT: HCPCS | Performed by: FAMILY MEDICINE

## 2022-07-21 PROCEDURE — 3017F COLORECTAL CA SCREEN DOC REV: CPT | Performed by: FAMILY MEDICINE

## 2022-07-21 PROCEDURE — 1123F ACP DISCUSS/DSCN MKR DOCD: CPT | Performed by: FAMILY MEDICINE

## 2022-07-21 SDOH — ECONOMIC STABILITY: FOOD INSECURITY: WITHIN THE PAST 12 MONTHS, YOU WORRIED THAT YOUR FOOD WOULD RUN OUT BEFORE YOU GOT MONEY TO BUY MORE.: NEVER TRUE

## 2022-07-21 SDOH — ECONOMIC STABILITY: FOOD INSECURITY: WITHIN THE PAST 12 MONTHS, THE FOOD YOU BOUGHT JUST DIDN'T LAST AND YOU DIDN'T HAVE MONEY TO GET MORE.: NEVER TRUE

## 2022-07-21 ASSESSMENT — PATIENT HEALTH QUESTIONNAIRE - PHQ9
SUM OF ALL RESPONSES TO PHQ9 QUESTIONS 1 & 2: 0
6. FEELING BAD ABOUT YOURSELF - OR THAT YOU ARE A FAILURE OR HAVE LET YOURSELF OR YOUR FAMILY DOWN: 0
SUM OF ALL RESPONSES TO PHQ QUESTIONS 1-9: 1
8. MOVING OR SPEAKING SO SLOWLY THAT OTHER PEOPLE COULD HAVE NOTICED. OR THE OPPOSITE, BEING SO FIGETY OR RESTLESS THAT YOU HAVE BEEN MOVING AROUND A LOT MORE THAN USUAL: 0
4. FEELING TIRED OR HAVING LITTLE ENERGY: 1
SUM OF ALL RESPONSES TO PHQ QUESTIONS 1-9: 1
3. TROUBLE FALLING OR STAYING ASLEEP: 0
SUM OF ALL RESPONSES TO PHQ QUESTIONS 1-9: 1
5. POOR APPETITE OR OVEREATING: 0
7. TROUBLE CONCENTRATING ON THINGS, SUCH AS READING THE NEWSPAPER OR WATCHING TELEVISION: 0
10. IF YOU CHECKED OFF ANY PROBLEMS, HOW DIFFICULT HAVE THESE PROBLEMS MADE IT FOR YOU TO DO YOUR WORK, TAKE CARE OF THINGS AT HOME, OR GET ALONG WITH OTHER PEOPLE: 0
9. THOUGHTS THAT YOU WOULD BE BETTER OFF DEAD, OR OF HURTING YOURSELF: 0
1. LITTLE INTEREST OR PLEASURE IN DOING THINGS: 0
2. FEELING DOWN, DEPRESSED OR HOPELESS: 0
SUM OF ALL RESPONSES TO PHQ QUESTIONS 1-9: 1

## 2022-07-21 ASSESSMENT — SOCIAL DETERMINANTS OF HEALTH (SDOH): HOW HARD IS IT FOR YOU TO PAY FOR THE VERY BASICS LIKE FOOD, HOUSING, MEDICAL CARE, AND HEATING?: NOT HARD AT ALL

## 2022-07-21 ASSESSMENT — LIFESTYLE VARIABLES
HOW MANY STANDARD DRINKS CONTAINING ALCOHOL DO YOU HAVE ON A TYPICAL DAY: PATIENT DOES NOT DRINK
HOW OFTEN DO YOU HAVE A DRINK CONTAINING ALCOHOL: NEVER

## 2022-07-21 NOTE — PROGRESS NOTES
Medicare Annual Wellness Visit    Tresa Montana is here for Medicare AWV    Assessment & Plan   Medicare annual wellness visit, subsequent    Recommendations for Preventive Services Due: see orders and patient instructions/AVS.  Recommended screening schedule for the next 5-10 years is provided to the patient in written form: see Patient Instructions/AVS.     No follow-ups on file. Subjective       Patient's complete Health Risk Assessment and screening values have been reviewed and are found in Flowsheets. The following problems were reviewed today and where indicated follow up appointments were made and/or referrals ordered. Positive Risk Factor Screenings with Interventions:              Health Habits/Nutrition:  Physical Activity: Sufficiently Active    Days of Exercise per Week: 3 days    Minutes of Exercise per Session: 60 min     Have you lost any weight without trying in the past 3 months?: No     Have you seen the dentist within the past year?: Yes  Health Habits/Nutrition Interventions:  Nutritional issues:  patient is not ready to address his/her nutritional/weight issues at this time             Objective      Patient-Reported Vitals  No data recorded    Unable to obtain 3 vital signs due to patient not having equipment to take blood pressure/temperature. Allergies   Allergen Reactions    Pollen Extract     Tree Extract     Vicodin [Hydrocodone-Acetaminophen] Nausea And Vomiting     Prior to Visit Medications    Medication Sig Taking?  Authorizing Provider   dilTIAZem (CARTIA XT) 300 MG extended release capsule Take 1 capsule by mouth daily Yes Deon Burrell MD   pioglitazone (ACTOS) 30 MG tablet Take 1 tablet by mouth daily Yes Deon Burrell MD   glipiZIDE (GLUCOTROL XL) 2.5 MG extended release tablet Take 1 tablet by mouth daily Yes Deon Burrell MD   atorvastatin (LIPITOR) 20 MG tablet TAKE 1 TABLET BY MOUTH ONE TIME A DAY Yes Deon Burrell MD   metFORMIN (GLUCOPHAGE) 500 MG tablet TAKE 2 TABS BY MOUTH IN THE MORNING, 1 TAB AT NOON, AND 2 TABS IN THE EVENING WITH MEALS Yes Lyubov Rowley MD   dapagliflozin (FARXIGA) 10 MG tablet Take 1 tablet by mouth every morning Yes Lyubov Rowley MD   lisinopril (PRINIVIL;ZESTRIL) 5 MG tablet TAKE 1 TABLET BY MOUTH EVERY DAY Yes Lyubov Rowley MD   Multiple Vitamins-Minerals (THERAPEUTIC MULTIVITAMIN-MINERALS) tablet Take 1 tablet by mouth daily Yes Historical Provider, MD   COMBIGAN 0.2-0.5 % ophthalmic solution  Yes Historical Provider, MD   LATANOPROST OP Apply to eye Yes Historical Provider, MD   Probiotic Product (PROBIOTIC DAILY PO) Take by mouth Yes Historical Provider, MD   niacinamide 500 MG tablet Take 500 mg by mouth 2 times daily (with meals) Yes Historical Provider, MD   PANTOTHENIC ACID PO Take 500 mg by mouth daily. Yes Historical Provider, MD   Multiple Vitamins-Minerals (OCUVITE) TABS oral tablet Take 1 tablet by mouth daily. Yes Historical Provider, MD   vitamin D3 (CHOLECALCIFEROL) 400 units TABS tablet Take by mouth daily Patient states taking 1000 units daily. Yes Historical Provider, MD Willard (Including outside providers/suppliers regularly involved in providing care):   Patient Care Team:  Lyubov Rowley MD as PCP - General (Family Medicine)  Lyubov Rowley MD as PCP - [facility] Empaneled Provider     Reviewed and updated this visit:  Allergies  Meds               I, Clifm Sleeper, LPN, 6/94/9384, performed the documented evaluation under the direct supervision of the attending physicianCj Valle, was evaluated through a synchronous (real-time) audio encounter. The patient (or guardian if applicable) is aware that this is a billable service, which includes applicable co-pays. This Virtual Visit was conducted with patient's (and/or legal guardian's) consent.  The visit was conducted pursuant to the emergency declaration under the [address] and the

## 2022-07-21 NOTE — PATIENT INSTRUCTIONS
Personalized Preventive Plan for Monica Pereira - 7/21/2022  Medicare offers a range of preventive health benefits. Some of the tests and screenings are paid in full while other may be subject to a deductible, co-insurance, and/or copay. Some of these benefits include a comprehensive review of your medical history including lifestyle, illnesses that may run in your family, and various assessments and screenings as appropriate. After reviewing your medical record and screening and assessments performed today your provider may have ordered immunizations, labs, imaging, and/or referrals for you. A list of these orders (if applicable) as well as your Preventive Care list are included within your After Visit Summary for your review. Other Preventive Recommendations:    A preventive eye exam performed by an eye specialist is recommended every 1-2 years to screen for glaucoma; cataracts, macular degeneration, and other eye disorders. A preventive dental visit is recommended every 6 months. Try to get at least 150 minutes of exercise per week or 10,000 steps per day on a pedometer . Order or download the FREE \"Exercise & Physical Activity: Your Everyday Guide\" from [employer]. Call 9-539.869.7650 or search [employer] online. You need 5137-2104 mg of calcium and 3454-9205 IU of vitamin D per day. It is possible to meet your calcium requirement with diet alone, but a vitamin D supplement is usually necessary to meet this goal.  When exposed to the sun, use a sunscreen that protects against both UVA and UVB radiation with an SPF of 30 or greater. Reapply every 2 to 3 hours or after sweating, drying off with a towel, or swimming. Always wear a seat belt when traveling in a car. Always wear a helmet when riding a bicycle or motorcycle.

## 2022-07-28 ENCOUNTER — HOSPITAL ENCOUNTER (EMERGENCY)
Age: 72
Discharge: HOME | End: 2022-07-28
Attending: EMERGENCY MEDICINE
Payer: MEDICARE

## 2022-07-28 ENCOUNTER — APPOINTMENT (OUTPATIENT)
Dept: GENERAL RADIOLOGY | Age: 72
End: 2022-07-28
Payer: MEDICARE

## 2022-07-28 VITALS
DIASTOLIC BLOOD PRESSURE: 90 MMHG | OXYGEN SATURATION: 95 % | HEIGHT: 72 IN | TEMPERATURE: 97.9 F | SYSTOLIC BLOOD PRESSURE: 138 MMHG | RESPIRATION RATE: 16 BRPM | WEIGHT: 222 LBS | HEART RATE: 102 BPM | BODY MASS INDEX: 30.07 KG/M2

## 2022-07-28 DIAGNOSIS — S60.222A CONTUSION OF LEFT HAND, INITIAL ENCOUNTER: Primary | ICD-10-CM

## 2022-07-28 PROCEDURE — 99283 EMERGENCY DEPT VISIT LOW MDM: CPT

## 2022-07-28 PROCEDURE — 73130 X-RAY EXAM OF HAND: CPT

## 2022-07-28 ASSESSMENT — PAIN DESCRIPTION - DESCRIPTORS: DESCRIPTORS: SHARP

## 2022-07-28 ASSESSMENT — PAIN SCALES - GENERAL: PAINLEVEL_OUTOF10: 7

## 2022-07-28 ASSESSMENT — PAIN DESCRIPTION - LOCATION: LOCATION: HAND

## 2022-07-28 ASSESSMENT — PAIN - FUNCTIONAL ASSESSMENT: PAIN_FUNCTIONAL_ASSESSMENT: 0-10

## 2022-07-28 ASSESSMENT — PAIN DESCRIPTION - ORIENTATION: ORIENTATION: LEFT

## 2022-07-28 ASSESSMENT — PAIN DESCRIPTION - FREQUENCY: FREQUENCY: INTERMITTENT

## 2022-07-29 NOTE — ED PROVIDER NOTES
CHIEF COMPLAINT    Chief Complaint   Patient presents with    Hand Injury     Left 4th finger     HPI  Leigh Harvey is a 67 y.o. male who presents to the ED with complaints of left hand injury. Patient was working on the lawn more earlier when his hand slipped and struck the housing unit. He predominately has pain and swelling to fourth metacarpal phalangeal joint of left hand. Pain describes a throbbing stabbing pain rated 7/10 exacerbated with palpation and movement. Nothing makes it better. Patient is right-hand dominant with no previous injury to left hand. Pain does not radiate. He denies numbness, tingling, nausea, vomiting      REVIEW OF SYSTEMS  Constitutional: No fever, chills or recent illness. Eye: No visual changes  HENT: No earache or sore throat. Resp: No SOB or productive cough. Cardio: No chest pain or palpitations. GI: No abdominal pain, nausea, vomiting, constipation or diarrhea. No melena. : No dysuria, urgency or frequency. Endocrine: No heat intolerance, no cold intolerance, no polydipsia   Lymphatics: No adenopathy  Musculoskeletal: Complains of left hand injury  Neuro: No headaches. Psych: No homicidal or suicidal thoughts  Skin: No rash, No itching. ?  ?   PAST MEDICAL HISTORY  Past Medical History:   Diagnosis Date    Benign prostatic hyperplasia     DVT (deep venous thrombosis) (HCC)     Hemochromatosis     History of pulmonary embolism     Hyperlipidemia     Hypertension, essential     Kidney stone     in er for kidney stones(1/14/13), had Dilaudid, saw Dr Arville Paget, did pass one\"    Major depressive disorder     Mantoux: positive     Polycythemia vera(238.4) dx late 1980s    \"per wife-(1/18/13) \"according to Dr Lenore Hunter he does not have this but he does have high iron\"    Pulmonary embolism [facility])     old chart gives hx brant PE with right lower DVT 7/2012(pc)(had scope left knee 4/2012)- had Filter placement done 7/31/2012    Tinnitus     Type 2 diabetes mellitus ([facility] [address]     dx MOUTH IN THE MORNING, 1 TAB AT NOON, AND 2 TABS IN THE EVENING WITH MEALS    MULTIPLE VITAMINS-MINERALS (OCUVITE) TABS ORAL TABLET    Take 1 tablet by mouth daily. MULTIPLE VITAMINS-MINERALS (THERAPEUTIC MULTIVITAMIN-MINERALS) TABLET    Take 1 tablet by mouth daily    NIACINAMIDE 500 MG TABLET    Take 500 mg by mouth 2 times daily (with meals)    PANTOTHENIC ACID PO    Take 500 mg by mouth daily. PIOGLITAZONE (ACTOS) 30 MG TABLET    Take 1 tablet by mouth daily    PROBIOTIC PRODUCT (PROBIOTIC DAILY PO)    Take by mouth    VITAMIN D3 (CHOLECALCIFEROL) 400 UNITS TABS TABLET    Take by mouth daily Patient states taking 1000 units daily. ALLERGIES  Allergies   Allergen Reactions    Pollen Extract     Tree Extract     Vicodin [Hydrocodone-Acetaminophen] Nausea And Vomiting       Nursing notes reviewed by myself for past medical history, family history, social history, surgical history, current medications, and allergies. PHYSICAL EXAM  VITAL SIGNS: Triage VS:    ED Triage Vitals [07/28/22 2137]   Enc Vitals Group      BP (!) 138/90      Heart Rate (!) 102      Resp 16      Temp 97.9 °F (36.6 °C)      Temp Source Infrared      SpO2 95 %      Weight 222 lb (100.7 kg)      Height 6' (1.829 m)      Head Circumference       Peak Flow       Pain Score       Pain Loc       Pain Edu? Excl. in [address]? Constitutional: Well developed, Well nourished, nontoxic appearing  HENT: Normocephalic, Atraumatic, Bilateral external ears normal, Nose normal.   Eyes: Conjunctiva normal, No discharge. No scleral icterus. Neck: Normal range of motion, No tenderness, Supple. Lymphatic: No lymphadenopathy noted. Cardiovascular: Normal heart rate  Thorax & Lungs: No respiratory distress  Skin: Warm, Dry, Pink, No mottling, No erythema, No rash. Extremities:  No cyanosis, Normal perfusion, No clubbing.    Musculoskeletal: Small skin abrasion overlying dorsal aspect of left fourth metacarpal phalangeal joint that is tender timeline  ? New Prescriptions    No medications on file     FINAL IMPRESSION  1. Contusion of left hand, initial encounter      Electronically signed by:  [address], DO, 7/28/2022         [address], DO  07/28/22 1940 Annia Harden,   07/28/22 6902

## 2022-07-29 NOTE — ED TRIAGE NOTES
Patient presents to ED with c/o of left hand injury. Per patient he was working on his mower when the wrench slipped and he jammed his left hand into a metal park of the mower.

## 2022-07-29 NOTE — ED NOTES
Patient prepared for and ready to be discharged. Patient discharged at this time in no acute distress after verbalizing understanding of discharge instructions. Patient left after receiving After Visit Summary instructions.        Sophie Oconnor RN  07/28/22 4801

## 2022-08-01 DIAGNOSIS — E11.9 TYPE 2 DIABETES MELLITUS WITHOUT COMPLICATION, WITHOUT LONG-TERM CURRENT USE OF INSULIN (HCC): ICD-10-CM

## 2022-08-01 RX ORDER — LISINOPRIL 5 MG/1
TABLET ORAL
Qty: 90 TABLET | Refills: 1 | Status: SHIPPED | OUTPATIENT
Start: 2022-08-01

## 2022-08-22 ENCOUNTER — OFFICE VISIT (OUTPATIENT)
Dept: FAMILY MEDICINE CLINIC | Age: 72
End: 2022-08-22
Payer: OTHER GOVERNMENT

## 2022-08-22 VITALS
WEIGHT: 222.6 LBS | HEIGHT: 72 IN | DIASTOLIC BLOOD PRESSURE: 66 MMHG | OXYGEN SATURATION: 95 % | HEART RATE: 84 BPM | SYSTOLIC BLOOD PRESSURE: 108 MMHG | BODY MASS INDEX: 30.15 KG/M2

## 2022-08-22 DIAGNOSIS — M54.50 ACUTE LEFT-SIDED LOW BACK PAIN WITHOUT SCIATICA: Primary | ICD-10-CM

## 2022-08-22 PROCEDURE — 1036F TOBACCO NON-USER: CPT | Performed by: FAMILY MEDICINE

## 2022-08-22 PROCEDURE — 99213 OFFICE O/P EST LOW 20 MIN: CPT | Performed by: FAMILY MEDICINE

## 2022-08-22 PROCEDURE — 1123F ACP DISCUSS/DSCN MKR DOCD: CPT | Performed by: FAMILY MEDICINE

## 2022-08-22 PROCEDURE — 3017F COLORECTAL CA SCREEN DOC REV: CPT | Performed by: FAMILY MEDICINE

## 2022-08-22 PROCEDURE — G8427 DOCREV CUR MEDS BY ELIG CLIN: HCPCS | Performed by: FAMILY MEDICINE

## 2022-08-22 PROCEDURE — G8417 CALC BMI ABV UP PARAM F/U: HCPCS | Performed by: FAMILY MEDICINE

## 2022-08-22 RX ORDER — METHYLPREDNISOLONE 4 MG/1
TABLET ORAL
Qty: 1 KIT | Refills: 0 | Status: SHIPPED | OUTPATIENT
Start: 2022-08-22 | End: 2022-08-28

## 2022-08-22 RX ORDER — TIZANIDINE 4 MG/1
4 TABLET ORAL 3 TIMES DAILY PRN
Qty: 25 TABLET | Refills: 0 | Status: SHIPPED | OUTPATIENT
Start: 2022-08-22 | End: 2022-10-26

## 2022-08-22 ASSESSMENT — ENCOUNTER SYMPTOMS
ABDOMINAL PAIN: 0
NAUSEA: 0
TROUBLE SWALLOWING: 0
WHEEZING: 0
CHEST TIGHTNESS: 0
VOMITING: 0
EYE PAIN: 0
BACK PAIN: 1
SHORTNESS OF BREATH: 0
DIARRHEA: 0
BLOOD IN STOOL: 0

## 2022-08-22 NOTE — PROGRESS NOTES
8/22/2022    Mor Rodriguez    Chief Complaint   Patient presents with    Lower Back Pain     Lower left side back pain. Six days ago tying shoes. When he stood up he had sharp pain in lower left side. Eased up some after that. Yesterday leaned over bathroom sink a little to far to see in mirror and had a sharp pain again. Could hardly walk yesterday. Currently a 6 on the pain scale. HPI  History was obtained from the patient and his wife. Evelin Stokes is a 67 y.o. male who presents today with history of 6 days of left low back pain. Started as listed above while tying shoes got somewhat better then leaned over to look in the mirror closely and had another flare of pain in same area. Patient is having no bladder or bowel changes. No radiation of pain into his legs. Is having occasional spasming. No history of rash in this area. He has been using Tylenol and ice or heat. It is a tiny bit better than what it had been. He has had a little bit of back issue in the past but nothing this severe. REVIEW OF SYMPTOMS    Review of Systems   Constitutional:  Negative for activity change and fatigue. HENT:  Negative for congestion, hearing loss, mouth sores and trouble swallowing. Eyes:  Negative for pain and visual disturbance. Respiratory:  Negative for chest tightness, shortness of breath and wheezing. Cardiovascular:  Negative for chest pain and palpitations. Gastrointestinal:  Negative for abdominal pain, blood in stool, diarrhea, nausea and vomiting. Genitourinary:  Negative for dysuria, frequency and urgency. Musculoskeletal:  Positive for arthralgias, back pain, gait problem and myalgias. Negative for neck stiffness. Patient with left low back pain worse with certain positions and movements. Some spasticity noted at times. No radicular component noted -other than position changes no acute injury   Skin:  Negative for rash. Allergic/Immunologic: Negative for environmental allergies. Neurological:  Negative for dizziness, seizures, speech difficulty and weakness. Hematological:  Does not bruise/bleed easily. Psychiatric/Behavioral:  Negative for agitation, confusion, hallucinations and suicidal ideas. The patient is not nervous/anxious.       PAST MEDICAL HISTORY  Past Medical History:   Diagnosis Date    Benign prostatic hyperplasia     DVT (deep venous thrombosis) (HCC)     Hemochromatosis     History of pulmonary embolism     Hyperlipidemia     Hypertension, essential     Kidney stone     in er for kidney stones(1/14/13), had Dilaudid, saw Dr Florin Miranda, did pass one\"    Major depressive disorder     Mantoux: positive     Polycythemia vera(238.4) dx late 1980s    \"per wife-(1/18/13) \"according to Dr Sophie Rodrigues he does not have this but he does have high iron\"    Pulmonary embolism [facility])     old chart gives hx brant PE with right lower DVT 7/2012(pc)(had scope left knee 4/2012)- had Filter placement done 7/31/2012    Tinnitus     Type 2 diabetes mellitus ([facility] [address]     dx 2003       FAMILY HISTORY  Family History   Problem Relation Age of Onset    Arthritis Mother     Cancer Mother         kidney cancer    Miscarriages / Stillbirths Mother     Stroke Mother     Heart Disease Father         MI    High Cholesterol Father     Kidney Disease Brother     Arthritis Sister     Mental Illness Sister     Bipolar Disorder Sister     No Known Problems Sister     Heart Disease Son     Stroke Son         age 32       SOCIAL HISTORY  Social History     Socioeconomic History    Marital status:    Tobacco Use    Smoking status: Never    Smokeless tobacco: Never   Vaping Use    Vaping Use: Never used   Substance and Sexual Activity    Alcohol use: No    Drug use: No     Social Determinants of Health     Financial Resource Strain: Low Risk     Difficulty of Paying Living Expenses: Not hard at all   Food Insecurity: No Food Insecurity    Worried About Running Out of Food in the Last Year: Never true    Ran Out of Food in the Last Year: Never true   Physical Activity: Sufficiently Active    Days of Exercise per Week: 3 days    Minutes of Exercise per Session: 60 min        SURGICAL HISTORY  Past Surgical History:   Procedure Laterality Date    COLONOSCOPY      KNEE ARTHROSCOPY Right 04/2012    LITHOTRIPSY      SKIN BIOPSY  June 2012    Spermatocelectomy    VASECTOMY  1980    VENA CAVA FILTER PLACEMENT                   CURRENT MEDICATIONS  Current Outpatient Medications   Medication Sig Dispense Refill    methylPREDNISolone (MEDROL DOSEPACK) 4 MG tablet Take by mouth. 1 kit 0    tiZANidine (ZANAFLEX) 4 MG tablet Take 1 tablet by mouth 3 times daily as needed (muscle spasm) 25 tablet 0    metFORMIN (GLUCOPHAGE) 500 MG tablet TAKE 2 TABS BY MOUTH IN THE MORNING, 1 TAB AT NOON, AND 2 TABS IN THE EVENING WITH MEALS 450 tablet 0    lisinopril (PRINIVIL;ZESTRIL) 5 MG tablet TAKE 1 TABLET BY MOUTH EVERY DAY 90 tablet 1    dapagliflozin (FARXIGA) 10 MG tablet Take 1 tablet by mouth every morning 30 tablet 5    dilTIAZem (CARTIA XT) 300 MG extended release capsule Take 1 capsule by mouth daily 90 capsule 1    pioglitazone (ACTOS) 30 MG tablet Take 1 tablet by mouth daily 90 tablet 1    glipiZIDE (GLUCOTROL XL) 2.5 MG extended release tablet Take 1 tablet by mouth daily 90 tablet 1    atorvastatin (LIPITOR) 20 MG tablet TAKE 1 TABLET BY MOUTH ONE TIME A DAY 90 tablet 1    Multiple Vitamins-Minerals (THERAPEUTIC MULTIVITAMIN-MINERALS) tablet Take 1 tablet by mouth daily      COMBIGAN 0.2-0.5 % ophthalmic solution       LATANOPROST OP Apply to eye      Probiotic Product (PROBIOTIC DAILY PO) Take by mouth      niacinamide 500 MG tablet Take 500 mg by mouth 2 times daily (with meals)      PANTOTHENIC ACID PO Take 500 mg by mouth daily. Multiple Vitamins-Minerals (OCUVITE) TABS oral tablet Take 1 tablet by mouth daily. vitamin D3 (CHOLECALCIFEROL) 400 units TABS tablet Take by mouth daily Patient states taking 1000 units daily. Cranial Nerves: No cranial nerve deficit. Motor: No weakness. Psychiatric:         Mood and Affect: Mood normal.         Behavior: Behavior normal.         Thought Content: Thought content normal.       ASSESSMENT & PLAN     Diagnosis Orders   1. Acute left-sided low back pain without sciatica        He is to alternate ice with heat but especially use ice to area of pain given some gentle stretches to do for low back. Treating with a Medrol 4 mg Dosepak and may have some tizanidine 4 mg 3 times daily as needed muscle spasm use especially at night. He is to monitor blood sugars as Medrol may elevate sugar slightly. He is to see how symptoms do over the next few days if symptoms worsen or persist may need further evaluation with imaging and/or physical therapy eval etc.  Call with questions or changes. Return if symptoms worsen or fail to improve.          Electronically signed by Whitfield Hashimoto, MD on 8/22/2022

## 2022-08-25 ENCOUNTER — HOSPITAL ENCOUNTER (OUTPATIENT)
Dept: GENERAL RADIOLOGY | Age: 72
Discharge: HOME | End: 2022-08-25
Payer: MEDICARE

## 2022-08-25 ENCOUNTER — HOSPITAL ENCOUNTER (OUTPATIENT)
Age: 72
Discharge: HOME | End: 2022-08-25
Payer: MEDICARE

## 2022-08-25 ENCOUNTER — TELEPHONE (OUTPATIENT)
Dept: FAMILY MEDICINE CLINIC | Age: 72
End: 2022-08-25

## 2022-08-25 DIAGNOSIS — M54.50 ACUTE LEFT-SIDED LOW BACK PAIN WITHOUT SCIATICA: ICD-10-CM

## 2022-08-25 DIAGNOSIS — M54.50 ACUTE LEFT-SIDED LOW BACK PAIN WITHOUT SCIATICA: Primary | ICD-10-CM

## 2022-08-25 PROCEDURE — 72100 X-RAY EXAM L-S SPINE 2/3 VWS: CPT

## 2022-08-25 NOTE — TELEPHONE ENCOUNTER
Please send order for LS spine x-rays to [facility] and also set of PT at [facility] for this left-sided back pain. We will see how he does with physical therapy and a bit more time depending on his response there and LS-spine x-ray results may need further imaging.

## 2022-08-25 NOTE — TELEPHONE ENCOUNTER
Patient called and stated that his back pain is worse. Requesting a order for imaging. Send order to [facility].  Call patient and advise

## 2022-08-31 DIAGNOSIS — I71.40 ABDOMINAL AORTIC ANEURYSM (AAA) WITHOUT RUPTURE: Primary | ICD-10-CM

## 2022-09-01 ENCOUNTER — HOSPITAL ENCOUNTER (OUTPATIENT)
Dept: ULTRASOUND IMAGING | Age: 72
Discharge: HOME | End: 2022-09-01
Payer: MEDICARE

## 2022-09-01 DIAGNOSIS — I71.40 ABDOMINAL AORTIC ANEURYSM (AAA) WITHOUT RUPTURE: ICD-10-CM

## 2022-09-01 PROCEDURE — 76775 US EXAM ABDO BACK WALL LIM: CPT

## 2022-09-01 PROCEDURE — 93978 VASCULAR STUDY: CPT

## 2022-09-02 ENCOUNTER — TELEPHONE (OUTPATIENT)
Dept: FAMILY MEDICINE CLINIC | Age: 72
End: 2022-09-02

## 2022-09-02 PROBLEM — I71.40 ABDOMINAL AORTIC ANEURYSM (AAA) WITHOUT RUPTURE (HCC): Status: ACTIVE | Noted: 2022-09-02

## 2022-09-02 NOTE — TELEPHONE ENCOUNTER
Spoke to patient per Dr. Leti Rebollar note: Please let patient know that ultrasound did show a 3.7 cm aneurysm this is only an moderate range. Recommendation is for repeat ultrasound in 2 years. Questions patient had are attached to the result note.

## 2022-09-07 ENCOUNTER — TELEPHONE (OUTPATIENT)
Dept: FAMILY MEDICINE CLINIC | Age: 72
End: 2022-09-07

## 2022-09-07 NOTE — LETTER
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 If you have any questions or concerns, please don't hesitate to call.     Sincerely,        Ken Villa MD

## 2022-09-07 NOTE — TELEPHONE ENCOUNTER
To Dr. 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         Fax#  [phone]    Joel Hdz at [facility] ( for the [facility])      Patient's Phone Number---  Phone:  [phone]

## 2022-09-09 ENCOUNTER — HOSPITAL ENCOUNTER (OUTPATIENT)
Dept: PHYSICAL THERAPY | Age: 72
Setting detail: THERAPIES SERIES
Discharge: HOME | End: 2022-09-09
Payer: MEDICARE

## 2022-09-09 PROCEDURE — 97110 THERAPEUTIC EXERCISES: CPT

## 2022-09-09 PROCEDURE — 97161 PT EVAL LOW COMPLEX 20 MIN: CPT

## 2022-09-09 ASSESSMENT — PAIN DESCRIPTION - LOCATION: LOCATION: BACK

## 2022-09-09 ASSESSMENT — PAIN DESCRIPTION - FREQUENCY: FREQUENCY: CONTINUOUS

## 2022-09-09 ASSESSMENT — PAIN SCALES - GENERAL: PAINLEVEL_OUTOF10: 1

## 2022-09-09 ASSESSMENT — PAIN DESCRIPTION - DESCRIPTORS: DESCRIPTORS: ACHING;DULL

## 2022-09-09 ASSESSMENT — PAIN - FUNCTIONAL ASSESSMENT: PAIN_FUNCTIONAL_ASSESSMENT: ACTIVITIES ARE NOT PREVENTED

## 2022-09-09 ASSESSMENT — PAIN DESCRIPTION - ONSET: ONSET: SUDDEN

## 2022-09-09 ASSESSMENT — PAIN DESCRIPTION - PAIN TYPE: TYPE: ACUTE PAIN

## 2022-09-09 ASSESSMENT — PAIN DESCRIPTION - ORIENTATION: ORIENTATION: LEFT

## 2022-09-09 NOTE — PLAN OF CARE
Outpatient Physical Therapy           [place]           [] Phone: [phone]   Fax: [phone]  [place]           [] Phone: [phone]   Fax: [phone]     To: Dr. Hemal London, *     From: Elyse Kevin, PT, DPT, OCS    Patient: Allyson Olson       : 1950  Diagnosis: Low back pain, unspecified [M54.50]    Treatment Diagnosis: LBP, hip ext weakness, flexiiblity restrictions  Date: 2022    Physical Therapy Certification/Re-Certification Form  Dear Dr. Nanci Bonilla  The following patient has been evaluated for physical therapy services and for therapy to continue, insurance requires physician review of the treatment plan initially and every 90 days. Please review the attached evaluation and/or summary of the patient's plan of care, and verify that you agree therapy should continue by signing the attached document and sending it back to our office. Assessment:     Pt is 67year old male with 1 month sudden onset of pain into low back after reaching to tie shoe. Pt now has has pain with all activities as low level with minimal fluctuation. Pt demo deficits this date that include hip ext/core weakness, hip flexor stiffness, decreased balance and pain. Testing this date indicate signs and symptoms of lumbar strain. Pt will benefit with PT services with progression of strength/ROM, manual and modalities to return to PLOF. Pt prior to onset of current condition had no pain with able to complete full ADLs and work activities. Patient received education on their current pathology and how their condition effects them with their functional activities. Patient understood discussion and questions were answered. Patient understands their activity limitations and understands rational for treatment progression.      Plan of Care/Treatment to date:  [x] Therapeutic Exercise  [x] Modalities:  [x] Therapeutic Activity     [] Ultrasound  [x] Electrical Stimulation  [] Gait Training      [] Cervical Traction [] Lumbar Traction  [x] Neuromuscular Re-education    [x] Cold/hotpack [] Iontophoresis   [x] Instruction in HEP      [] Vasopneumatic    [] Dry Needling  [x] Manual Therapy               [] Aquatic Therapy       Other:          Frequency/Duration:  # Days per week: [] 1 day # Weeks: [] 1 week [x] 5 weeks     [x] 2 days   [] 2 weeks [] 6 weeks     [] 3 days   [] 3 weeks [] 7 weeks     [] 4 days   [] 4 weeks [] 8 weeks         [] 9 weeks [] 10 weeks         [] 11 weeks [] 12 weeks    Rehab Potential/Progress: [] Excellent [x] Good [] Fair  [] Poor     Goals:    Patient goals: improve pain. Short term goals  Time Frame for Short term goals: Defer to [address] Term Goals  Time Frame for Long Term Goals: 5 weeks 10/16/22  Pt will demo I with HEP/symptom management. Pt will demo able to complete bridge without increase in pain to demo improved hip ext strength and tolerance. Pt will demo full lumbar flex AROM without increase in pain to ease reaching to floor. Pt will demo Oswestry <8% disability to demo improved function. Pt will demo normaal hip flexor flexibility to ease improve standing posture and symptoms. Electronically signed by:  Franchesca Dai PT, DPT, OCS 9/9/2022, 9:39 AM    9/9/2022 9:39 AM       If you have any questions or concerns, please don't hesitate to call.   Thank you for your referral.      Physician Signature:________________________________Date:_________ TIME: _____  By signing above, therapists plan is approved by physician

## 2022-09-09 NOTE — FLOWSHEET NOTE
Outpatient Physical Therapy  [place]           [x] Phone: [phone]   Fax: [phone]  [place]           [] Phone: [phone]   Fax: [phone]        Physical Therapy Daily Treatment Note  Date:  2022    Patient Name:  Gustavo Huber    :  1950  MRN: [id number]  Restrictions/Precautions: No data recorded      Diagnosis:   Low back pain, unspecified [M54.50]    Date of Injury/Surgery:   Treatment Diagnosis:  LBP, hip ext weakness, flexiiblity restrictions  Insurance/Certification information:  [employer]   Referring Physician:  Jack Kirkpatrick, *     PCP: Dhaval Gale MD  Next Doctor Visit:    Plan of care signed (Y/N):  N, sent 22   Outcome Measure: Oswestry: 14% disability   Visit# / total visits:   1/10 per POC  Pain level: 1/10   Goals:     Patient goals: improve pain. Short term goals  Time Frame for Short term goals: Defer to [address] Term Goals  Time Frame for Long Term Goals: 5 weeks 10/16/22  Pt will demo I with HEP/symptom management. Pt will demo able to complete bridge without increase in pain to demo improved hip ext strength and tolerance. Pt will demo full lumbar flex AROM without increase in pain to ease reaching to floor. Pt will demo Oswestry <8% disability to demo improved function. Pt will demo normaal hip flexor flexibility to ease improve standing posture and symptoms. Summary of Evaluation:   Pt is 67year old male with 1 month sudden onset of pain into low back after reaching to tie shoe. Pt now has has pain with all activities as low level with minimal fluctuation. Pt demo deficits this date that include hip ext/core weakness, hip flexor stiffness, decreased balance and pain. Testing this date indicate signs and symptoms of lumbar strain. Pt will benefit with PT services with progression of strength/ROM, manual and modalities to return to PLOF.  Pt prior to onset of current condition had no pain with able to complete full ADLs and work activities. Patient received education on their current pathology and how their condition effects them with their functional activities. Patient understood discussion and questions were answered. Patient understands their activity limitations and understands rational for treatment progression. Subjective:  See keven         Any changes in Ambulatory Summary Sheet? None        Objective:  See eval   COVID screening questions were asked and patient attested that there had been no contact or symptoms        Exercises: (No more than 4 columns)   Exercise/Equipment 9/9/22   #1 Date Date           WARM UP         Nu step             TABLE      *Supine hip flexor stretch  30\"x2     *Sitting L side lumbar flexion stretch  15\"x3 to the R     Bridge with black pad       Prone trunk raise         LTR                                     STANDING      *Hip ext  10x2     *Paloff press  RTB 10x2                                        PROPRIOCEPTION                                    MODALITIES                      Other Therapeutic Activities/Education:  Patient received education on their current pathology and how their condition effects them with their functional activities. Patient understood discussion and questions were answered. Patient understands their activity limitations and understands rational for treatment progression. Home Exercise Program:  HO issued, reviewed and discussed with patient. Pt agreed to comply. Manual Treatments:  --      Modalities:  --      Communication with other providers:  POC sent 9/9/22       Assessment:  (Response towards treatment session) (Pain Rating)     Pt is 67year old male with 1 month sudden onset of pain into low back after reaching to tie shoe. Pt now has has pain with all activities as low level with minimal fluctuation. Pt demo deficits this date that include hip ext/core weakness, hip flexor stiffness, decreased balance and pain. Testing this date indicate signs and symptoms of lumbar strain. Pt will benefit with PT services with progression of strength/ROM, manual and modalities to return to PLOF. Pt prior to onset of current condition had no pain with able to complete full ADLs and work activities. Patient received education on their current pathology and how their condition effects them with their functional activities. Patient understood discussion and questions were answered. Patient understands their activity limitations and understands rational for treatment progression. Plan for Next Session:   Specific Instructions for Next Treatment: review HEP, hip flexor stretching, hip extensor/core strengthening, paraspinal stretching, modalities PRN.     Time In / Time Out:    [id number]        Timed Code/Total Treatment Minutes:  14/45'   14' TE, 1 PT eval       Next Progress Note due:  Josep 9/9/22    Visit 10       Plan of Care Interventions:  [x] Therapeutic Exercise  [x] Modalities:  [x] Therapeutic Activity     [] Ultrasound  [x] Estim  [] Gait Training      [] Cervical Traction [] Lumbar Traction  [x] Neuromuscular Re-education    [x] Cold/hotpack [] Iontophoresis   [x] Instruction in HEP      [] Vasopneumatic   [] Dry Needling    [] Manual Therapy               [] Aquatic Therapy              Electronically signed by:  Rudy Hale PT, DPT, OCS  9/9/2022, 9:40 AM    9/9/2022 9:40 AM

## 2022-09-15 ENCOUNTER — HOSPITAL ENCOUNTER (OUTPATIENT)
Dept: PHYSICAL THERAPY | Age: 72
Setting detail: THERAPIES SERIES
Discharge: HOME | End: 2022-09-15
Payer: MEDICARE

## 2022-09-15 PROCEDURE — 97110 THERAPEUTIC EXERCISES: CPT

## 2022-09-15 NOTE — FLOWSHEET NOTE
complete full ADLs and work activities. Patient received education on their current pathology and how their condition effects them with their functional activities. Patient understood discussion and questions were answered. Patient understands their activity limitations and understands rational for treatment progression. Subjective:  1/10 pain currently. Min change in pain. Unable to complete supine hip flexor stretch without increase in pain. Any changes in Ambulatory Summary Sheet? None        Objective:     COVID screening questions were asked and patient attested that there had been no contact or symptoms    Able to complete supine hip flexor stretch with contralateral LE knee flexed   Good technique with all other exercises   No reported pain with all activities below. Exercises: (No more than 4 columns)   Exercise/Equipment 9/9/22   #1 9/15/22  #2 Date           WARM UP         Nu step    Lv4   3'           TABLE      *Supine hip flexor stretch  30\"x2 30\"x2    *Sitting L side lumbar flexion stretch  15\"x3 to the R discussed    Bridge with black pad   GSB 10x2     Prone trunk raise         LTR   10x2     DKTC  15\"x4                            STANDING      *Hip ext  10x2 10x    *Paloff press  RTB 10x2 RTB 10x                                       PROPRIOCEPTION                                    MODALITIES                      Other Therapeutic Activities/Education:  --      Home Exercise Program:  HO issued, reviewed and discussed with patient. Pt agreed to comply. Manual Treatments:  --      Modalities:  --      Communication with other providers:  POC sent 9/9/22       Assessment:  (Response towards treatment session) (Pain Rating)  Reviewed HEP, good technique. Altered hip flexor stretch with contralateral knee flexed to reduced lumbar ext with leg drop off table with now no longer painful. Added additional activities this date without issue.  Low irritability and disability with plan to go on vacation and complete HEP for the next week. Will assess for adverse effects and progress functionally as tolerated. Pt would continue to benefit from skilled therapy interventions to address remaining impairments, improve mobility and strength and progress toward goal completion while reducing risk for re-injury or further decline. Reported no increase in pain at 1/10 post tx. Pt is 67year old male with 1 month sudden onset of pain into low back after reaching to tie shoe. Pt now has has pain with all activities as low level with minimal fluctuation. Pt demo deficits this date that include hip ext/core weakness, hip flexor stiffness, decreased balance and pain. Testing this date indicate signs and symptoms of lumbar strain. Pt will benefit with PT services with progression of strength/ROM, manual and modalities to return to [facility]. Pt prior to onset of current condition had no pain with able to complete full ADLs and work activities. Patient received education on their current pathology and how their condition effects them with their functional activities. Patient understood discussion and questions were answered. Patient understands their activity limitations and understands rational for treatment progression. Plan for Next Session:   Specific Instructions for Next Treatment: review HEP, hip flexor stretching, hip extensor/core strengthening, paraspinal stretching, modalities PRN.     Time In / Time Out:    1756-5971        Timed Code/Total Treatment Minutes:  30/30'      30' TE      Next Progress Note due:  Staceyal 9/9/22    Visit 10       Plan of Care Interventions:  [x] Therapeutic Exercise  [x] Modalities:  [x] Therapeutic Activity     [] Ultrasound  [x] Estim  [] Gait Training      [] Cervical Traction [] Lumbar Traction  [x] Neuromuscular Re-education    [x] Cold/hotpack [] Iontophoresis   [x] Instruction in HEP      [] Vasopneumatic   [] Dry Needling    [] Manual Therapy [] Aquatic Therapy              Electronically signed by:  Keanu Braxton, PT, DPT, OCS  9/15/2022, 11:33 AM    9/15/2022 11:33 AM

## 2022-09-21 ENCOUNTER — HOSPITAL ENCOUNTER (OUTPATIENT)
Dept: PHYSICAL THERAPY | Age: 72
Setting detail: THERAPIES SERIES
Discharge: HOME | End: 2022-09-21
Payer: MEDICARE

## 2022-09-21 PROCEDURE — 97112 NEUROMUSCULAR REEDUCATION: CPT

## 2022-09-21 PROCEDURE — 97110 THERAPEUTIC EXERCISES: CPT

## 2022-09-21 PROCEDURE — G0283 ELEC STIM OTHER THAN WOUND: HCPCS

## 2022-09-21 NOTE — FLOWSHEET NOTE
Outpatient Physical Therapy  [place]           [x] Phone: [phone]   Fax: [phone]  [facility]           [] Phone: [phone]   Fax: [phone]        Physical Therapy Daily Treatment Note  Date:  2022    Patient Name:  Agustina Mahmood    :  1950  MRN: [id number]  Restrictions/Precautions: No data recorded      Diagnosis:   Low back pain, unspecified [M54.50]    Date of Injury/Surgery:   Treatment Diagnosis:  LBP, hip ext weakness, flexiblity restrictions  Insurance/Certification information:  [employer]   Referring Physician:  Olegario Mcbride, *     PCP: Prashant Reed MD  Next Doctor Visit:    Plan of care signed (Y/N):  N, sent 22   Outcome Measure: Oswestry: 14% disability   Visit# / total visits:   3/10 per POC  Pain level: 1/10   Goals:     Patient goals: improve pain. Short term goals  Time Frame for Short term goals: Defer to [address] Term Goals  Time Frame for Long Term Goals: 5 weeks 10/16/22  Pt will demo I with HEP/symptom management. Pt will demo able to complete bridge without increase in pain to demo improved hip ext strength and tolerance. Pt will demo full lumbar flex AROM without increase in pain to ease reaching to floor. Pt will demo Oswestry <8% disability to demo improved function. Pt will demo normal hip flexor flexibility to ease improve standing posture and symptoms. Summary of Evaluation:   Pt is 67year old male with 1 month sudden onset of pain into low back after reaching to tie shoe. Pt now has has pain with all activities as low level with minimal fluctuation. Pt demo deficits this date that include hip ext/core weakness, hip flexor stiffness, decreased balance and pain. Testing this date indicate signs and symptoms of lumbar strain. Pt will benefit with PT services with progression of strength/ROM, manual and modalities to return to PLOF.  Pt prior to onset of current condition had no pain with able to complete full ADLs and work activities. Patient received education on their current pathology and how their condition effects them with their functional activities. Patient understood discussion and questions were answered. Patient understands their activity limitations and understands rational for treatment progression. Subjective:  1/10 pain currently. Min change in pain. He hasn't done his HEP consistently because he was on a vacation for the past 5 days. Any changes in Ambulatory Summary Sheet? None        Objective:     COVID screening questions were asked and patient attested that there had been no contact or symptoms    Able to complete supine hip flexor stretch with contralateral LE knee flexed and pulled to chest  Good technique with all other exercises   Reported pain with bridges. Able to perform with black boster. Cued for hip locking with standing activities    Exercises: (No more than 4 columns)   Exercise/Equipment 9/9/22   #1 9/15/22  #2 Date           WARM UP         Nu step    Lv4   3'  Lv 5 5'         TABLE      *Supine hip flexor stretch  30\"x2 30\"x2 30s x 2   *Sitting L side lumbar flexion stretch  15\"x3 to the R discussed    Bridge with black pad   GSB 10x2  GSB 10x  Black wedge x 10 improved hip ext. Prone trunk raise       LTR   10x2  10x 3\" ea. DKTC  15\"x4    PPT   X 10 3\"   PPT with add   X 10 3\"   HL SL alt abd w TB   X 10 ea. GTB   Prone quad stretch   30 s x 2 ea. HS curl prone   X 10 ea. Prone hip ext. Knee bent   X 10 ea. STANDING      *Hip ext  10x2 10x    *Paloff press  RTB 10x2 RTB 10x RTB x 10 ea. Ext row   GTB x 10                                PROPRIOCEPTION                                    MODALITIES      Estim +HP   10' IFC             Other Therapeutic Activities/Education: educated on proper bed mobility and muscle recruitment to avoid pain. Pt verbalized understanding.       Home Exercise Program:  HO issued, reviewed and discussed with patient. Pt agreed to comply. Access Code: [email or web address]  URL: [email or web address]  Date: 09/21/2022  Prepared by: Pedro Vo    Exercises  Supine Posterior Pelvic Tilt - 1 x daily - 7 x weekly - 1-3 sets - 10 reps  Hooklying Clamshell with Resistance - 1 x daily - 7 x weekly - 1-3 sets - 10 reps  Prone Knee Flexion - 1 x daily - 7 x weekly - 3 sets - 10 reps  Prone Quadriceps Stretch with Strap - 1 x daily - 7 x weekly - 1 sets - 3 reps - 30 hold  Shoulder Extension with Resistance - 1 x daily - 7 x weekly - 3 sets - 10 reps  Prone Hip Extension with Bent Knee - 1 x daily - 7 x weekly - 1-3 sets - 10 reps      Manual Treatments:  --      Modalities:  imani IFC L LB 10' + HP LB in sitting      Communication with other providers:  POC sent 9/9/22       Assessment:  Pt demonstrated good tolerance to new exercises and no pain. Very low tolerance to bridges at this time with increase pain. Improved tolerance without pain with prone ext. Decrease flexibility and core/glut weakness limitations. Updated HEP . Pt would continue to benefit from skilled therapy interventions to address remaining impairments, improve mobility and strength and progress toward goal completion while reducing risk for re-injury or further decline. Pain: 0/10 post tx     Pt is 67year old male with 1 month sudden onset of pain into low back after reaching to tie shoe. Pt now has has pain with all activities as low level with minimal fluctuation. Pt demo deficits this date that include hip ext/core weakness, hip flexor stiffness, decreased balance and pain. Testing this date indicate signs and symptoms of lumbar strain. Pt will benefit with PT services with progression of strength/ROM, manual and modalities to return to PLOF. Pt prior to onset of current condition had no pain with able to complete full ADLs and work activities.  Patient received education on their current pathology and how their condition effects them with their functional activities. Patient understood discussion and questions were answered. Patient understands their activity limitations and understands rational for treatment progression. Plan for Next Session:   Specific Instructions for Next Treatment: review HEP, hip flexor stretching, hip extensor/core strengthening, paraspinal stretching, modalities PRN.     Time In / Time Out:    7165-1280        Timed Code/Total Treatment Minutes: 50'/60' 2 TE 1 NR 1 estim      Next Progress Note due:  Eval 9/9/22    Visit 10       Plan of Care Interventions:  [x] Therapeutic Exercise  [x] Modalities:  [x] Therapeutic Activity     [] Ultrasound  [x] Estim  [] Gait Training      [] Cervical Traction [] Lumbar Traction  [x] Neuromuscular Re-education    [x] Cold/hotpack [] Iontophoresis   [x] Instruction in HEP      [] Vasopneumatic   [] Dry Needling    [] Manual Therapy               [] Aquatic Therapy              Electronically signed by:  Bruno Banks PTA, CLT  9/21/2022, 9:20 AM

## 2022-09-23 ENCOUNTER — HOSPITAL ENCOUNTER (OUTPATIENT)
Dept: PHYSICAL THERAPY | Age: 72
Setting detail: THERAPIES SERIES
Discharge: HOME | End: 2022-09-23
Payer: MEDICARE

## 2022-09-23 PROCEDURE — 97110 THERAPEUTIC EXERCISES: CPT

## 2022-09-23 PROCEDURE — 97112 NEUROMUSCULAR REEDUCATION: CPT

## 2022-09-23 PROCEDURE — G0283 ELEC STIM OTHER THAN WOUND: HCPCS

## 2022-09-23 NOTE — FLOWSHEET NOTE
Outpatient Physical Therapy  [place]           [x] Phone: [phone]   Fax: [phone]  [facility]aciel[facility] Re[facility]           [] Phone: [phone]   Fax: [phone]        Physical Therapy Daily Treatment Note  Date:  2022    Patient Name:  Miguel Bustillos    :  1950  MRN: [id number]  Restrictions/Precautions: No data recorded      Diagnosis:   Low back pain, unspecified [M54.50]    Date of Injury/Surgery:   Treatment Diagnosis:  LBP, hip ext weakness, flexiblity restrictions  Insurance/Certification information:  [facility]   Referring Physician:  Jayesh Sousa, *     PCP: Yovani Sapp MD  Next Doctor Visit:    Plan of care signed (Y/N):  Yes   Outcome Measure: Oswestry: 14% disability   Visit# / total visits:   /10 per POC  Pain level: 0/10 110 twinge with Sit>stand  Goals:     Patient goals: improve pain. Short term goals  Time Frame for Short term goals: Defer to [address] Term Goals  Time Frame for Long Term Goals: 5 weeks 10/16/22  Pt will demo I with HEP/symptom management. Pt will demo able to complete bridge without increase in pain to demo improved hip ext strength and tolerance. Pt will demo full lumbar flex AROM without increase in pain to ease reaching to floor. Pt will demo Oswestry <8% disability to demo improved function. Pt will demo normal hip flexor flexibility to ease improve standing posture and symptoms. Summary of Evaluation:   Pt is 67year old male with 1 month sudden onset of pain into low back after reaching to tie shoe. Pt now has has pain with all activities as low level with minimal fluctuation. Pt demo deficits this date that include hip ext/core weakness, hip flexor stiffness, decreased balance and pain. Testing this date indicate signs and symptoms of lumbar strain. Pt will benefit with PT services with progression of strength/ROM, manual and modalities to return to PLOF.  Pt prior to onset of current condition had no pain with able to complete full ADLs and work activities. Patient received education on their current pathology and how their condition effects them with their functional activities. Patient understood discussion and questions were answered. Patient understands their activity limitations and understands rational for treatment progression. Subjective:  0/10 pain currently walking in. He does get a twinge when standing up. He feels like he is getting better. His hamstring did cramp up on him during his exercises. Bed mobility is getting better    Any changes in Ambulatory Summary Sheet? None        Objective:     COVID screening questions were asked and patient attested that there had been no contact or symptoms    Able to complete supine hip flexor stretch with contralateral LE knee flexed and pulled to chest no pain    R quad/ hip flexor increased tightness and decreased ROM. Improved post stretches  Improved PPT with cued to avoid glut recruitment  Reported pain with bridges. Able to perform with black maribeler. Cued for hip locking with standing activities    Exercises: (No more than 4 columns)   Exercise/Equipment 9/9/22   #1 9/15/22  #2 09/21/22 #3 9/23/22 #4            WARM UP       Scifit   Lv4   3'  Lv 5 5' Lv 5 5'          TABLE       *Supine hip flexor stretch  30\"x2 30\"x2 30s x 2 30s x 2   *Sitting L side lumbar flexion stretch  15\"x3 to the R discussed     Bridge with black pad   GSB 10x2  GSB 10x  Black wedge x 10 improved hip ext. Black wedge  10x   Prone trunk raise        LTR   10x2  10x 3\" ea. 10x 3\" ea. DKTC  15\"x4     PPT   X 10 3\" X 10 5\"   PPT with add   X 10 3\" X 10 5\"   HL SL alt abd w TB   X 10 ea. GTB X 15 ea. GTB   Prone quad stretch   30 s x 2 ea. 30 s x 2/3 L/R.   HS curl prone   X 10 ea. X 10 ea. Prone hip ext. Knee bent   X 10 ea. X 15 ea. STANDING       *Hip ext  10x2 10x     *Paloff press  RTB 10x2 RTB 10x RTB x 10 ea. RTB x 15 ea.    Ext row   GTB x 10 GTB x 15 PROPRIOCEPTION                                          MODALITIES       Estim +HP   10' IFC 10' IFC HP              Other Therapeutic Activities/Education: educated on proper bed mobility and muscle recruitment to avoid pain. Pt verbalized understanding. Educated on lifting body mechanics to facilitate improved posture with fall prep ADLs. Home Exercise Program:  HO issued, reviewed and discussed with patient. Pt agreed to comply. Access Code: [email or web address]  URL: [email or web address]  Date: 09/21/2022  Prepared by: Checo Opelika    Exercises  Supine Posterior Pelvic Tilt - 1 x daily - 7 x weekly - 1-3 sets - 10 reps  Hooklying Clamshell with Resistance - 1 x daily - 7 x weekly - 1-3 sets - 10 reps  Prone Knee Flexion - 1 x daily - 7 x weekly - 3 sets - 10 reps  Prone Quadriceps Stretch with Strap - 1 x daily - 7 x weekly - 1 sets - 3 reps - 30 hold  Shoulder Extension with Resistance - 1 x daily - 7 x weekly - 3 sets - 10 reps  Prone Hip Extension with Bent Knee - 1 x daily - 7 x weekly - 1-3 sets - 10 reps      Manual Treatments:  --      Modalities:  estim IFC L LB 10' + HP LB in sitting      Communication with other providers:  POC sent 9/9/22       Assessment:  Pt demonstrated good tolerance to increased and no pain. Some improved tolerance to bridges at this time. Decrease flexibility and core/glut weakness limitations. Issue body mechanics HO to improve lifting posture. Pt would continue to benefit from skilled therapy interventions to address remaining impairments, improve mobility and strength and progress toward goal completion while reducing risk for re-injury or further decline. Pain: 0/10 post tx     Pt is 67year old male with 1 month sudden onset of pain into low back after reaching to tie shoe. Pt now has has pain with all activities as low level with minimal fluctuation.  Pt demo deficits this date that include hip ext/core weakness, hip flexor stiffness, decreased balance and pain. Testing this date indicate signs and symptoms of lumbar strain. Pt will benefit with PT services with progression of strength/ROM, manual and modalities to return to PLOF. Pt prior to onset of current condition had no pain with able to complete full ADLs and work activities. Patient received education on their current pathology and how their condition effects them with their functional activities. Patient understood discussion and questions were answered. Patient understands their activity limitations and understands rational for treatment progression. Plan for Next Session:   Specific Instructions for Next Treatment: review HEP, hip flexor stretching, hip extensor/core strengthening, paraspinal stretching, modalities PRN.     Time In / Time Out:    6480-2874    Timed Code/Total Treatment Minutes: 50'/60' 2 TE 1 NR 1 estim      Next Progress Note due:  oJsep 9/9/22    Visit 10       Plan of Care Interventions:  [x] Therapeutic Exercise  [x] Modalities:  [x] Therapeutic Activity     [] Ultrasound  [x] Estim  [] Gait Training      [] Cervical Traction [] Lumbar Traction  [x] Neuromuscular Re-education    [x] Cold/hotpack [] Iontophoresis   [x] Instruction in HEP      [] Vasopneumatic   [] Dry Needling    [] Manual Therapy               [] Aquatic Therapy              Electronically signed by:  Lindy Irvin PTA, CLT  9/23/2022, 9:00 AM

## 2022-09-26 ENCOUNTER — HOSPITAL ENCOUNTER (OUTPATIENT)
Dept: PHYSICAL THERAPY | Age: 72
Setting detail: THERAPIES SERIES
Discharge: HOME | End: 2022-09-26
Payer: MEDICARE

## 2022-09-26 PROCEDURE — 97110 THERAPEUTIC EXERCISES: CPT

## 2022-09-26 PROCEDURE — 97112 NEUROMUSCULAR REEDUCATION: CPT

## 2022-09-26 PROCEDURE — G0283 ELEC STIM OTHER THAN WOUND: HCPCS

## 2022-09-29 ENCOUNTER — HOSPITAL ENCOUNTER (OUTPATIENT)
Dept: PHYSICAL THERAPY | Age: 72
Setting detail: THERAPIES SERIES
Discharge: HOME | End: 2022-09-29
Payer: MEDICARE

## 2022-09-29 PROCEDURE — 97112 NEUROMUSCULAR REEDUCATION: CPT

## 2022-09-29 PROCEDURE — 97110 THERAPEUTIC EXERCISES: CPT

## 2022-09-29 NOTE — FLOWSHEET NOTE
Outpatient Physical Therapy  [place]           [x] Phone: [phone]   Fax: [phone]  [place]           [] Phone: [phone]   Fax: [phone]        Physical Therapy Daily Treatment Note  Date:  2022    Patient Name:  Tresa Montana    :  1950  MRN: [id number]  Restrictions/Precautions: No data recorded      Diagnosis:   Low back pain, unspecified [M54.50]    Date of Injury/Surgery:   Treatment Diagnosis:  LBP, hip ext weakness, flexiblity restrictions  Insurance/Certification information:  [employer]   Referring Physician:  Nimco Chawla, *     PCP: Eun Dawn MD  Next Doctor Visit:    Plan of care signed (Y/N):  Yes   Outcome Measure: Oswestry: 14% disability   Visit# / total visits:   6/10 per POC  Pain level:  0/10    Goals:     Patient goals: improve pain. Short term goals  Time Frame for Short term goals: Defer to [address] Term Goals  Time Frame for Long Term Goals: 5 weeks 10/16/22  Pt will demo I with HEP/symptom management. Pt will demo able to complete bridge without increase in pain to demo improved hip ext strength and tolerance. Pt will demo full lumbar flex AROM without increase in pain to ease reaching to floor. Pt will demo Oswestry <8% disability to demo improved function. Pt will demo normal hip flexor flexibility to ease improve standing posture and symptoms. Summary of Evaluation:   Pt is 67year old male with 1 month sudden onset of pain into low back after reaching to tie shoe. Pt now has has pain with all activities as low level with minimal fluctuation. Pt demo deficits this date that include hip ext/core weakness, hip flexor stiffness, decreased balance and pain. Testing this date indicate signs and symptoms of lumbar strain. Pt will benefit with PT services with progression of strength/ROM, manual and modalities to return to PLOF.  Pt prior to onset of current condition had no pain with able to complete full ADLs and work activities. Patient received education on their current pathology and how their condition effects them with their functional activities. Patient understood discussion and questions were answered. Patient understands their activity limitations and understands rational for treatment progression. Subjective:  0/10 pain currently. Seldom with occasional twist with 1/10 pain 3x a week when typically not paying attention. Any changes in Ambulatory Summary Sheet? None        Objective:     COVID screening questions were asked and patient attested that there had been no contact or symptoms    I with HEP/symptom management  No pain with bridges with SB with limited ROM but no pain with \"pressure\"    Reports no pain with bed mobility but completed supine to sit with heavy abdominal activation with lacking roll prior to sitting up. Exercises: (No more than 4 columns)   Exercise/Equipment 9/23/22 #4 9/26/22/ #5 9/29/22   #6           WARM UP      Scifit Lv 5 5' Lv 5 5' Lv5  5'          TABLE      *Supine hip flexor stretch  30s x 2 30s x 2    Supine quad stretch with strap  30s x 2    *Sitting L side lumbar flexion stretch       Bridge with black pad  Black wedge  10x Low bridge x 10 with GTB  Black wedge  10x   GSB 10x2   Prone trunk raise   Unable  Low press up x 5 pain free range    LTR  10x 3\" ea. - 10x2 3\"   DKTC   15\"x4   PPT X 10 5\" X 15 5\"    PPT with add X 10 5\" X 15 5\"    HL SL alt abd w TB X 15 ea. GTB X 20 ea. GTB    Prone quad stretch 30 s x 2/3 L/R. 1' x 2 ea. HS curl prone X 10 ea. Prone hip ext. Knee bent X 15 ea. X 15 ea. Sitting trunk rotations    12# 10x2                                                    STANDING      *Hip ext       *Paloff press  RTB x 15 ea. RTB x 15 ea.  FM 10# 10x2    Ext row GTB x 15 GTB x 15    Single arm row FM   13# 10x2                      k    PROPRIOCEPTION      DLS on BOSU   37\"O5   min perturbations    Wobbleboard    20\"x3 each dir Lateral throw into rebounder    6# 10x2               MODALITIES      Estim +HP 10' IFC HP 10' IFC HP Declined              Other Therapeutic Activities/Education: educated on proper bed mobility and muscle recruitment to avoid pain. Pt verbalized understanding. Educated on lifting body mechanics to facilitate improved posture with fall prep ADLs. Home Exercise Program:  HO issued, reviewed and discussed with patient. Pt agreed to comply. Access Code: [email or web address]  URL: [email or web address]  Date: 09/21/2022  Prepared by: Isaias Green    Exercises  Supine Posterior Pelvic Tilt - 1 x daily - 7 x weekly - 1-3 sets - 10 reps  Hooklying Clamshell with Resistance - 1 x daily - 7 x weekly - 1-3 sets - 10 reps  Prone Knee Flexion - 1 x daily - 7 x weekly - 3 sets - 10 reps  Prone Quadriceps Stretch with Strap - 1 x daily - 7 x weekly - 1 sets - 3 reps - 30 hold  Shoulder Extension with Resistance - 1 x daily - 7 x weekly - 3 sets - 10 reps  Prone Hip Extension with Bent Knee - 1 x daily - 7 x weekly - 1-3 sets - 10 reps      Manual Treatments:  --      Modalities:      Communication with other providers:  POC sent 9/9/22       Assessment:  Pt demonstrated good tolerance with no pain with bridge and added standing exercises. Added additional balance with core recovery and dynamic core activities with tension with no increase in pain. CGA with light assist with more wobbleboard than BOSU. Will assess next visit with added activities this date. Pt would continue to benefit from skilled therapy interventions to address remaining impairments, improve mobility and strength and progress toward goal completion while reducing risk for re-injury or further decline    Pain: 0/10 post tx     Pt is 67year old male with 1 month sudden onset of pain into low back after reaching to tie shoe. Pt now has has pain with all activities as low level with minimal fluctuation.  Pt demo deficits this date that include hip ext/core weakness, hip flexor stiffness, decreased balance and pain. Testing this date indicate signs and symptoms of lumbar strain. Pt will benefit with PT services with progression of strength/ROM, manual and modalities to return to PLOF. Pt prior to onset of current condition had no pain with able to complete full ADLs and work activities. Patient received education on their current pathology and how their condition effects them with their functional activities. Patient understood discussion and questions were answered. Patient understands their activity limitations and understands rational for treatment progression. Plan for Next Session:   Specific Instructions for Next Treatment:  hip flexor stretching, hip extensor/core strengthening, paraspinal stretching, modalities PRN.     Time In / Time Out:    0533-7130    Timed Code/Total Treatment Minutes: 43/43'    2 33' TE 1 NR        Next Progress Note due:  Josep 9/9/22    Visit 10       Plan of Care Interventions:  [x] Therapeutic Exercise  [x] Modalities:  [x] Therapeutic Activity     [] Ultrasound  [x] Estim  [] Gait Training      [] Cervical Traction [] Lumbar Traction  [x] Neuromuscular Re-education    [x] Cold/hotpack [] Iontophoresis   [x] Instruction in HEP      [] Vasopneumatic   [] Dry Needling    [] Manual Therapy               [] Aquatic Therapy              Electronically signed by:  Jamari Guevara, PT, DPT, OCS     9/29/2022 7:06 AM

## 2022-10-03 ENCOUNTER — HOSPITAL ENCOUNTER (OUTPATIENT)
Dept: PHYSICAL THERAPY | Age: 72
Setting detail: THERAPIES SERIES
Discharge: HOME | End: 2022-10-03
Payer: MEDICARE

## 2022-10-03 PROCEDURE — 97112 NEUROMUSCULAR REEDUCATION: CPT

## 2022-10-03 PROCEDURE — 97110 THERAPEUTIC EXERCISES: CPT

## 2022-10-03 NOTE — FLOWSHEET NOTE
Outpatient Physical Therapy  [place]           [x] Phone: [phone]   Fax: [phone]  [place]           [] Phone: [phone]   Fax: [phone]        Physical Therapy Daily Treatment Note  Date:  10/3/2022    Patient Name:  Aleja Moon    :  1950  MRN: [id number]  Restrictions/Precautions: No data recorded      Diagnosis:   Low back pain, unspecified [M54.50]    Date of Injury/Surgery:   Treatment Diagnosis:  LBP, hip ext weakness, flexiblity restrictions  Insurance/Certification information:  [employer]   Referring Physician:  Jessie Jamil, *     PCP: Ramiro Alvares MD  Next Doctor Visit:    Plan of care signed (Y/N):  Yes   Outcome Measure: Oswestry: 14% disability   Visit# / total visits:   7/10 per POC  Pain level:  0/10    Goals:     Patient goals: improve pain. Short term goals  Time Frame for Short term goals: Defer to [address] Term Goals  Time Frame for Long Term Goals: 5 weeks 10/16/22  Pt will demo I with HEP/symptom management. Pt will demo able to complete bridge without increase in pain to demo improved hip ext strength and tolerance. Pt will demo full lumbar flex AROM without increase in pain to ease reaching to floor. Pt will demo Oswestry <8% disability to demo improved function. Pt will demo normal hip flexor flexibility to ease improve standing posture and symptoms. Summary of Evaluation:   Pt is 67year old male with 1 month sudden onset of pain into low back after reaching to tie shoe. Pt now has has pain with all activities as low level with minimal fluctuation. Pt demo deficits this date that include hip ext/core weakness, hip flexor stiffness, decreased balance and pain. Testing this date indicate signs and symptoms of lumbar strain. Pt will benefit with PT services with progression of strength/ROM, manual and modalities to return to PLOF.  Pt prior to onset of current condition had no pain with able to complete full ADLs and work activities. Patient received education on their current pathology and how their condition effects them with their functional activities. Patient understood discussion and questions were answered. Patient understands their activity limitations and understands rational for treatment progression. Subjective:  0/10 pain currently. Feel mildly tight in LB when he wakes up in the morning. He feels he purposefully avoid motin that would cause the twinge so he hasn't felt any since last tx. Any changes in Ambulatory Summary Sheet? None        Objective:     COVID screening questions were asked and patient attested that there had been no contact or symptoms    I with HEP/symptom management  No pain with bridges with SB with limited ROM but no pain   Low ROM with bridges    Reports no pain with bed mobility with improved bed mobility body mechanics. R trunk rotation noted with A/P wobble exercise    Exercises: (No more than 4 columns)   Exercise/Equipment 9/23/22 #4 9/26/22/ #5 9/29/22   #6 10/3/22 #7            WARM UP       Scifit Lv 5 5' Lv 5 5' Lv5  5'  Lv 6 5'          TABLE       *Supine hip flexor stretch  30s x 2 30s x 2     Supine quad stretch with strap  30s x 2     *Sitting L side lumbar flexion stretch        Bridge with black pad  Black wedge  10x Low bridge x 10 with GTB  Black wedge  10x   GSB 10x2 GSB 10x2  low   Prone trunk raise   Unable  Low press up x 5 pain free range     LTR  10x 3\" ea. - 10x2 3\" X 5 ea. DKTC   15\"x4 5\" x 10 ea. PPT X 10 5\" X 15 5\"     PPT with add X 10 5\" X 15 5\"     HL SL alt abd w TB X 15 ea. GTB X 20 ea. GTB     Prone quad stretch 30 s x 2/3 L/R. 1' x 2 ea. HS curl prone X 10 ea. Prone hip ext. Knee bent X 15 ea. X 15 ea. Sitting trunk rotations    12# 10x2  10# 10 x 2  Full rotations                                                           STANDING       *Hip ext        *Paloff press  RTB x 15 ea. RTB x 15 ea.  FM 10# 10x2  FM 10# 10x2 tx     Pt is 67year old male with 1 month sudden onset of pain into low back after reaching to tie shoe. Pt now has has pain with all activities as low level with minimal fluctuation. Pt demo deficits this date that include hip ext/core weakness, hip flexor stiffness, decreased balance and pain. Testing this date indicate signs and symptoms of lumbar strain. Pt will benefit with PT services with progression of strength/ROM, manual and modalities to return to PLOF. Pt prior to onset of current condition had no pain with able to complete full ADLs and work activities. Patient received education on their current pathology and how their condition effects them with their functional activities. Patient understood discussion and questions were answered. Patient understands their activity limitations and understands rational for treatment progression. Plan for Next Session:   Specific Instructions for Next Treatment:  hip flexor stretching, hip extensor/core strengthening, paraspinal stretching, modalities PRN.     Time In / Time Out:    6251/7306    Timed Code/Total Treatment Minutes: 45'/45'    2TE 1 NR        Next Progress Note due:  Josep 9/9/22    Visit 10       Plan of Care Interventions:  [x] Therapeutic Exercise  [x] Modalities:  [x] Therapeutic Activity     [] Ultrasound  [x] Estim  [] Gait Training      [] Cervical Traction [] Lumbar Traction  [x] Neuromuscular Re-education    [x] Cold/hotpack [] Iontophoresis   [x] Instruction in HEP      [] Vasopneumatic   [] Dry Needling    [] Manual Therapy               [] Aquatic Therapy              Electronically signed by:  Evgeny Bradford PTA, CLT 10/3/2022 8:55 AM

## 2022-10-05 ENCOUNTER — HOSPITAL ENCOUNTER (OUTPATIENT)
Dept: PHYSICAL THERAPY | Age: 72
Setting detail: THERAPIES SERIES
Discharge: HOME | End: 2022-10-05
Payer: MEDICARE

## 2022-10-05 PROCEDURE — 97110 THERAPEUTIC EXERCISES: CPT

## 2022-10-05 PROCEDURE — 97530 THERAPEUTIC ACTIVITIES: CPT

## 2022-10-05 PROCEDURE — 97112 NEUROMUSCULAR REEDUCATION: CPT

## 2022-10-05 NOTE — FLOWSHEET NOTE
Outpatient Physical Therapy  [place]           [x] Phone: [phone]   Fax: [phone]  [place]           [] Phone: [phone]   Fax: [phone]        Physical Therapy Daily Treatment Note  Date:  10/5/2022    Patient Name:  Jennifer Carlos    :  1950  MRN: [id number]  Restrictions/Precautions: No data recorded      Diagnosis:   Low back pain, unspecified [M54.50]    Date of Injury/Surgery:   Treatment Diagnosis:  LBP, hip ext weakness, flexiblity restrictions  Insurance/Certification information:  [employer]   Referring Physician:  Germaine Maxwell, *     PCP: Fidelina Jean-Baptiste MD  Next Doctor Visit:    Plan of care signed (Y/N):  Yes   Outcome Measure: Oswestry: 14% disability   Visit# / total visits:   8/10 per POC  Pain level:  1/10  R LB twinge  Goals:     Patient goals: improve pain. Short term goals  Time Frame for Short term goals: Defer to [address] Term Goals  Time Frame for Long Term Goals: 5 weeks 10/16/22  Pt will demo I with HEP/symptom management. Pt will demo able to complete bridge without increase in pain to demo improved hip ext strength and tolerance. Pt will demo full lumbar flex AROM without increase in pain to ease reaching to floor. Pt will demo Oswestry <8% disability to demo improved function. Pt will demo normal hip flexor flexibility to ease improve standing posture and symptoms. Summary of Evaluation:   Pt is 67year old male with 1 month sudden onset of pain into low back after reaching to tie shoe. Pt now has has pain with all activities as low level with minimal fluctuation. Pt demo deficits this date that include hip ext/core weakness, hip flexor stiffness, decreased balance and pain. Testing this date indicate signs and symptoms of lumbar strain. Pt will benefit with PT services with progression of strength/ROM, manual and modalities to return to PLOF.  Pt prior to onset of current condition had no pain with able to complete full ADLs and work activities. Patient received education on their current pathology and how their condition effects them with their functional activities. Patient understood discussion and questions were answered. Patient understands their activity limitations and understands rational for treatment progression. Subjective:  Pt stepping backward of a side walk while carrying a dog and felt a sharp twinge his R LB      Any changes in Ambulatory Summary Sheet? None        Objective:     COVID screening questions were asked and patient attested that there had been no contact or symptoms    Pelvis misalignment L posterior rotation. Corrected with MT  I with HEP/symptom management    Initial low bridge. Improved with a few AAROM for end range. No pain. Able to perform prone press ups no pain    Posterior pelvic tilt with sitting posture. Reports no pain with bed mobility with improved bed mobility body mechanics. R trunk rotation noted with A/P wobble exercise    Exercises: (No more than 4 columns)   Exercise/Equipment 9/26/22/ #5 9/29/22   #6 10/3/22 #7 10/5/22 #8            WARM UP       Scifit Lv 5 5' Lv5  5'  Lv 6 5' Lv 6 5'          TABLE       *Supine hip flexor stretch  30s x 2      Supine quad stretch with strap 30s x 2      *Sitting L side lumbar flexion stretch        Bridge with black pad  Low bridge x 10 with GTB  Black wedge  10x   GSB 10x2 GSB 10x2  low GSB 10 x 2     Prone trunk raise  Unable  Low press up x 5 pain free range      LTR  - 10x2 3\" X 5 ea. DKTC  15\"x4 5\" x 10 ea. X10  DL on SB   PPT X 15 5\"      PPT with add X 15 5\"      HL SL alt abd w TB X 20 ea. GTB      Prone quad stretch 1' x 2 ea. Prone press ups    2x5   UE strength  limitations   HS curl prone       Prone hip ext. Knee bent X 15 ea.       Sitting trunk rotations   12# 10x2  10# 10 x 2  Full rotations 10# 10 x 2  Full rotations   Sitting W trunk flexion/SB    X5 5\" STANDING       *Hip ext        *Paloff press  RTB x 15 ea. FM 10# 10x2  FM 10# 10x2  FM 10# 10x2    Ext row GTB x 15      Single arm row FM  13# 10x2 FM 13# 10x2  FM 13# 10x2                             PROPRIOCEPTION       DLS on BOSU  03\"K2   min perturbations  1' x 2 min instability    Wobbleboard   20\"x3 each dir  1' x 2 ea. 1' x 2 min instability  1 finger   Lateral throw into rebounder   6# 10x2                   MODALITIES       Estim +HP 10' IFC HP Declined  x x              Other Therapeutic Activities/Education: educated on proper bed mobility and muscle recruitment to avoid pain. Pt verbalized understanding. Educated on lifting body mechanics to facilitate improved posture with fall prep ADLs. 10/5/22 Educated on sitting posture and practicing daily sitting posture correction. Possibly using towel under ischial tubs to retrain sitting posture. Home Exercise Program:  HO issued, reviewed and discussed with patient. Pt agreed to comply. Access Code: [email or web address]  URL: [email or web address]  Date: 09/21/2022  Prepared by: Yolanda Vidal    Exercises  Supine Posterior Pelvic Tilt - 1 x daily - 7 x weekly - 1-3 sets - 10 reps  Hooklying Clamshell with Resistance - 1 x daily - 7 x weekly - 1-3 sets - 10 reps  Prone Knee Flexion - 1 x daily - 7 x weekly - 3 sets - 10 reps  Prone Quadriceps Stretch with Strap - 1 x daily - 7 x weekly - 1 sets - 3 reps - 30 hold  Shoulder Extension with Resistance - 1 x daily - 7 x weekly - 3 sets - 10 reps  Prone Hip Extension with Bent Knee - 1 x daily - 7 x weekly - 1-3 sets - 10 reps      Manual Treatments:  MET Pelvis alignment, leg pull, shot gun      Modalities:      Communication with other providers:  POC sent 9/9/22       Assessment:  Pt demonstrated good tolerance with no pain with new exercises and good pelvic alignment. He demonstrated some increase trunk flexion tolerance. Challenged with proper sitting posture.  Noted glut and core strength deficits with some improvement. Will assess next visit with added activities this date. Pt would continue to benefit from skilled therapy interventions to address remaining impairments, improve mobility and strength and progress toward goal completion while reducing risk for re-injury or further decline    Pain: 0/10 post tx looser     Pt is 67year old male with 1 month sudden onset of pain into low back after reaching to tie shoe. Pt now has has pain with all activities as low level with minimal fluctuation. Pt demo deficits this date that include hip ext/core weakness, hip flexor stiffness, decreased balance and pain. Testing this date indicate signs and symptoms of lumbar strain. Pt will benefit with PT services with progression of strength/ROM, manual and modalities to return to PLOF. Pt prior to onset of current condition had no pain with able to complete full ADLs and work activities. Patient received education on their current pathology and how their condition effects them with their functional activities. Patient understood discussion and questions were answered. Patient understands their activity limitations and understands rational for treatment progression. Plan for Next Session:   Specific Instructions for Next Treatment: Check pelvic alignment, hip extensor/core strengthening, paraspinal stretching, modalities PRN.     Time In / Time Out:   0845/0930    Timed Code/Total Treatment Minutes: 45'/45'    1TE 1 NR  1 MT      Next Progress Note due:  Josep 9/9/22    Visit 10       Plan of Care Interventions:  [x] Therapeutic Exercise  [x] Modalities:  [x] Therapeutic Activity     [] Ultrasound  [x] Estim  [] Gait Training      [] Cervical Traction [] Lumbar Traction  [x] Neuromuscular Re-education    [x] Cold/hotpack [] Iontophoresis   [x] Instruction in HEP      [] Vasopneumatic   [] Dry Needling    [] Manual Therapy               [] Aquatic Therapy              Electronically signed by:  Erika Jerome PTA, CLT 10/5/2022 8:43 AM

## 2022-10-10 ENCOUNTER — HOSPITAL ENCOUNTER (OUTPATIENT)
Dept: PHYSICAL THERAPY | Age: 72
Setting detail: THERAPIES SERIES
Discharge: HOME | End: 2022-10-10
Payer: MEDICARE

## 2022-10-10 PROCEDURE — 97112 NEUROMUSCULAR REEDUCATION: CPT

## 2022-10-10 PROCEDURE — 97110 THERAPEUTIC EXERCISES: CPT

## 2022-10-10 NOTE — FLOWSHEET NOTE
full ADLs and work activities. Patient received education on their current pathology and how their condition effects them with their functional activities. Patient understood discussion and questions were answered. Patient understands their activity limitations and understands rational for treatment progression. Subjective:  Pt stated he hasn't had any pain. Feel like he is doing a lot better. Any changes in Ambulatory Summary Sheet? None        Objective:     COVID screening questions were asked and patient attested that there had been no contact or symptoms    Able to perform full bridge no pain  I with HEP/symptom management  Oswestry 6% disability    Initial mid range bridge. Improved to Full AROM . No pain. Able to perform prone press ups no pain  Mild Posterior pelvic tilt with sitting posture. Reports no pain with bed mobility with improved bed mobility body mechanics. Exercises: (No more than 4 columns)   Exercise/Equipment 9/26/22/ #5 9/29/22   #6 10/3/22 #7 10/5/22 #8 10/10/22 #9          OSW 3/50  6% disability   WARM UP        Scifit Lv 5 5' Lv5  5'  Lv 6 5' Lv 6 5' Lv 6 5'           TABLE        *Supine hip flexor stretch  30s x 2       Supine quad stretch with strap 30s x 2       *Sitting L side lumbar flexion stretch         Bridge with black pad  Low bridge x 10 with GTB  Black wedge  10x   GSB 10x2 GSB 10x2  low GSB 10 x 2   GSB x8  Standard x 10  Full ROM   Prone trunk raise  Unable  Low press up x 5 pain free range       LTR  - 10x2 3\" X 5 ea. DKTC  15\"x4 5\" x 10 ea. X10  DL on SB X10  DL on SB 5\"   PPT X 15 5\"       PPT with add X 15 5\"       HL SL alt abd w TB X 20 ea. GTB       Prone quad stretch 1' x 2 ea. Prone press ups    2x5   UE strength  limitations 2x5   UE strength  limitations   HS curl prone        Prone hip ext. Knee bent X 15 ea.        Sitting trunk rotations   12# 10x2  10# 10 x 2  Full rotations 10# 10 x 2  Full rotations 10# 10 x 2  Full rotations Sitting W trunk flexion/SB    X5 5\" X10 5\"                                                           STANDING        *Hip ext         *Paloff press  RTB x 15 ea. FM 10# 10x2  FM 10# 10x2  FM 10# 10x2  FM 13# 10x2   Ext row GTB x 15       Single arm row FM  13# 10x2 FM 13# 10x2  FM 13# 10x2 FM 17# 10x2                                PROPRIOCEPTION        DLS on BOSU  20\"P2   min perturbations  1' x 2 min instability     Wobbleboard   20\"x3 each dir  1' x 2 ea. 1' x 2 min instability  1 finger    Lateral throw into rebounder   6# 10x2                      MODALITIES        Estim +HP 10' IFC HP Declined  x x x               Other Therapeutic Activities/Education: educated on proper bed mobility and muscle recruitment to avoid pain. Pt verbalized understanding. Educated on lifting body mechanics to facilitate improved posture with fall prep ADLs. 10/5/22 Educated on sitting posture and practicing daily sitting posture correction. Possibly using towel under ischial tubs to retrain sitting posture. Home Exercise Program:  HO issued, reviewed and discussed with patient. Pt agreed to comply. Access Code: [email or web address]  URL: [email or web address]  Date: 09/21/2022  Prepared by: Usama Mcnally    Exercises  Supine Posterior Pelvic Tilt - 1 x daily - 7 x weekly - 1-3 sets - 10 reps  Hooklying Clamshell with Resistance - 1 x daily - 7 x weekly - 1-3 sets - 10 reps  Prone Knee Flexion - 1 x daily - 7 x weekly - 3 sets - 10 reps  Prone Quadriceps Stretch with Strap - 1 x daily - 7 x weekly - 1 sets - 3 reps - 30 hold  Shoulder Extension with Resistance - 1 x daily - 7 x weekly - 3 sets - 10 reps  Prone Hip Extension with Bent Knee - 1 x daily - 7 x weekly - 1-3 sets - 10 reps      Manual Treatments:  MET Pelvis alignment, leg pull, shot gun      Modalities: x     Communication with other providers:  POC sent 9/9/22       Assessment:  Pt demonstrated good tolerance with no pain with increase weight.  Able to perform full

## 2022-10-13 ENCOUNTER — TELEPHONE (OUTPATIENT)
Dept: FAMILY MEDICINE CLINIC | Age: 72
End: 2022-10-13

## 2022-10-13 ENCOUNTER — HOSPITAL ENCOUNTER (OUTPATIENT)
Age: 72
Setting detail: SPECIMEN
Discharge: HOME | End: 2022-10-13
Payer: MEDICARE

## 2022-10-13 ENCOUNTER — OFFICE VISIT (OUTPATIENT)
Dept: FAMILY MEDICINE CLINIC | Age: 72
End: 2022-10-13
Payer: MEDICARE

## 2022-10-13 VITALS
TEMPERATURE: 97 F | RESPIRATION RATE: 12 BRPM | OXYGEN SATURATION: 96 % | HEART RATE: 100 BPM | HEIGHT: 72 IN | WEIGHT: 219.2 LBS | BODY MASS INDEX: 29.69 KG/M2

## 2022-10-13 DIAGNOSIS — J06.9 VIRAL URI WITH COUGH: Primary | ICD-10-CM

## 2022-10-13 PROCEDURE — G8484 FLU IMMUNIZE NO ADMIN: HCPCS | Performed by: PHYSICIAN ASSISTANT

## 2022-10-13 PROCEDURE — 99213 OFFICE O/P EST LOW 20 MIN: CPT | Performed by: PHYSICIAN ASSISTANT

## 2022-10-13 PROCEDURE — G8427 DOCREV CUR MEDS BY ELIG CLIN: HCPCS | Performed by: PHYSICIAN ASSISTANT

## 2022-10-13 PROCEDURE — G8417 CALC BMI ABV UP PARAM F/U: HCPCS | Performed by: PHYSICIAN ASSISTANT

## 2022-10-13 PROCEDURE — U0005 INFEC AGEN DETEC AMPLI PROBE: HCPCS

## 2022-10-13 PROCEDURE — U0003 INFECTIOUS AGENT DETECTION BY NUCLEIC ACID (DNA OR RNA); SEVERE ACUTE RESPIRATORY SYNDROME CORONAVIRUS 2 (SARS-COV-2) (CORONAVIRUS DISEASE [COVID-19]), AMPLIFIED PROBE TECHNIQUE, MAKING USE OF HIGH THROUGHPUT TECHNOLOGIES AS DESCRIBED BY CMS-2020-01-R: HCPCS

## 2022-10-13 PROCEDURE — 3017F COLORECTAL CA SCREEN DOC REV: CPT | Performed by: PHYSICIAN ASSISTANT

## 2022-10-13 PROCEDURE — 1123F ACP DISCUSS/DSCN MKR DOCD: CPT | Performed by: PHYSICIAN ASSISTANT

## 2022-10-13 PROCEDURE — 1036F TOBACCO NON-USER: CPT | Performed by: PHYSICIAN ASSISTANT

## 2022-10-13 RX ORDER — BENZONATATE 200 MG/1
200 CAPSULE ORAL 3 TIMES DAILY PRN
Qty: 30 CAPSULE | Refills: 0 | Status: SHIPPED | OUTPATIENT
Start: 2022-10-13

## 2022-10-13 NOTE — PROGRESS NOTES
10/13/22  Jason Herzog  1950    FLU/COVID-19 CLINIC EVALUATION    HPI SYMPTOMS:    Employer: Retired    [x] Fevers  [x] Chills  [x] Cough  [] Coughing up blood  [] Chest Congestion  [x] Nasal Congestion  [] Feeling short of breath  [] Sometimes  [] Frequently  [] All the time  [] Chest pain  [] Headaches  []Tolerable  [] Severe  [x] Sore throat  [] Muscle aches  [] Nausea  [] Vomiting  []Unable to keep fluids down  [x] Diarrhea  []Severe    [x] OTHER SYMPTOMS:    Fatigue  Dizziness    Symptom Duration:   [] 1  [x] 2   [] 3   [] 4    [] 5   [] 6   [] 7   [] 8   [] 9   [] 10   [] 11   [] 12   [] 13   [] 14   [] Longer than 14 days    Symptom course:   [] Worsening     [x] Stable     [] Improving    RISK FACTORS:    [] Pregnant or possibly pregnant  [x] Age over 61  [x] Diabetes  [] Heart disease  [] Asthma  [] COPD/Other chronic lung diseases  [] Active Cancer  [] On Chemotherapy  [] Taking oral steroids  [] History Lymphoma/Leukemia  [] Close contact with a lab confirmed COVID-19 patient within 14 days of symptom onset  [] History of travel from affected geographical areas within 14 days of symptom onset       VITALS:  There were no vitals filed for this visit. TESTS:    POCT FLU:  [] Positive     []Negative    ASSESSMENT:    [] Flu  [] Possible COVID-19  [] Strep    PLAN:    [] Discharge home with written instructions for:  [] Flu management  [] Possible COVID-19 infection with self-quarantine and management of symptoms  [] Follow-up with primary care physician or emergency department if worsens  [] Evaluation per physician/NP/PA in clinic  [] Sent to ER       An  electronic signature was used to authenticate this note.      --Annia Foster on 10/13/2022 at 8:55 AM

## 2022-10-13 NOTE — PROGRESS NOTES
10/13/2022    [facility]    Chief Complaint   Patient presents with    Cough       HPI  History was obtained from patient and his wife. Ericka Hazel is a 67 y.o. male who presents today with complaints of 2-day history of tactile fever, chills, cough, nasal congestion, sore throat, diarrhea and fatigue. He has had a few brief spells of dizziness. He denies chest pain, chest tightness or heaviness, shortness of breath or wheezing, leg pain or swelling. He denies nausea, vomiting, abdominal pain, bloody or black stools, loss of taste or smell. He has been taking Tylenol as needed. He reports good p.o. intake and urinary output. His wife has been sick with similar symptoms. She believes she has some type of cold. She has not been COVID tested. No known exposure to the virus. Ericka Hazel is vaccinated against LMVZA-50, boosted x2 with Garth Early. He also received his annual influenza vaccine 2 days ago.       PAST MEDICAL HISTORY  Past Medical History:   Diagnosis Date    Benign prostatic hyperplasia     DVT (deep venous thrombosis) (HCC)     Hemochromatosis     History of pulmonary embolism     Hyperlipidemia     Hypertension, essential     Kidney stone     in er for kidney stones(1/14/13), had Dilaudid, saw Dr Ki Sargent, did pass one\"    Major depressive disorder     Mantoux: positive     Polycythemia vera(238.4) dx late 1980s    \"per wife-(1/18/13) \"according to Dr Hess Gave he does not have this but he does have high iron\"    Pulmonary embolism [facility])     old chart gives hx brant PE with right lower DVT 7/2012(pc)(had scope left knee 4/2012)- had Filter placement done 7/31/2012    Tinnitus     Type 2 diabetes mellitus ([facility] [address]     dx 2003       FAMILY HISTORY  Family History   Problem Relation Age of Onset    Arthritis Mother     Cancer Mother         kidney cancer    Miscarriages / Stillbirths Mother     Stroke Mother     Heart Disease Father         MI    High Cholesterol Father     Kidney Disease Brother     Arthritis Sister Mental Illness Sister     Bipolar Disorder Sister     No Known Problems Sister     Heart Disease Son     Stroke Son         age 32       SOCIAL HISTORY  Social History     Socioeconomic History    Marital status:    Tobacco Use    Smoking status: Never    Smokeless tobacco: Never   Vaping Use    Vaping Use: Never used   Substance and Sexual Activity    Alcohol use: No    Drug use: No     Social Determinants of Health     Financial Resource Strain: Low Risk     Difficulty of Paying Living Expenses: Not hard at all   Food Insecurity: No Food Insecurity    Worried About Running Out of Food in the Last Year: Never true    Ran Out of Food in the Last Year: Never true   Physical Activity: Sufficiently Active    Days of Exercise per Week: 3 days    Minutes of Exercise per Session: 60 min        SURGICAL HISTORY  Past Surgical History:   Procedure Laterality Date    COLONOSCOPY      KNEE ARTHROSCOPY Right 04/2012    LITHOTRIPSY      SKIN BIOPSY  June 2012    Spermatocelectomy    VASECTOMY  1980    VENA CAVA FILTER PLACEMENT         CURRENT MEDICATIONS  Current Outpatient Medications   Medication Sig Dispense Refill    tiZANidine (ZANAFLEX) 4 MG tablet Take 1 tablet by mouth 3 times daily as needed (muscle spasm) 25 tablet 0    metFORMIN (GLUCOPHAGE) 500 MG tablet TAKE 2 TABS BY MOUTH IN THE MORNING, 1 TAB AT NOON, AND 2 TABS IN THE EVENING WITH MEALS 450 tablet 0    lisinopril (PRINIVIL;ZESTRIL) 5 MG tablet TAKE 1 TABLET BY MOUTH EVERY DAY 90 tablet 1    dapagliflozin (FARXIGA) 10 MG tablet Take 1 tablet by mouth every morning 30 tablet 5    dilTIAZem (CARTIA XT) 300 MG extended release capsule Take 1 capsule by mouth daily 90 capsule 1    pioglitazone (ACTOS) 30 MG tablet Take 1 tablet by mouth daily 90 tablet 1    glipiZIDE (GLUCOTROL XL) 2.5 MG extended release tablet Take 1 tablet by mouth daily 90 tablet 1    atorvastatin (LIPITOR) 20 MG tablet TAKE 1 TABLET BY MOUTH ONE TIME A DAY 90 tablet 1    Multiple Vitamins-Minerals (THERAPEUTIC MULTIVITAMIN-MINERALS) tablet Take 1 tablet by mouth daily      COMBIGAN 0.2-0.5 % ophthalmic solution       LATANOPROST OP Apply to eye      Probiotic Product (PROBIOTIC DAILY PO) Take by mouth      niacinamide 500 MG tablet Take 500 mg by mouth 2 times daily (with meals)      PANTOTHENIC ACID PO Take 500 mg by mouth daily. Multiple Vitamins-Minerals (OCUVITE) TABS oral tablet Take 1 tablet by mouth daily. vitamin D3 (CHOLECALCIFEROL) 400 units TABS tablet Take by mouth daily Patient states taking 1000 units daily. No current facility-administered medications for this visit. ALLERGIES  Allergies   Allergen Reactions    Pollen Extract     Tree Extract     Vicodin [Hydrocodone-Acetaminophen] Nausea And Vomiting       PHYSICAL EXAM    Pulse 100   Temp 97 °F (36.1 °C)   Resp 12   Ht 6' (1.829 m)   Wt 219 lb 3.2 oz (99.4 kg)   SpO2 96%   BMI 29.73 kg/m²     Constitutional:  Well developed, well nourished. Pleasant and cooperative. No acute distress  HENT:  Normocephalic, atraumatic, bilateral external ears normal, bilateral ear canals and TMs normal, oropharynx moist, postnasal drip noted. No petechiae or exudate. Uvula midline and benign. Airway patent. Nasal mucosa congested. Clear rhinorrhea. Eyes:  conjunctiva normal, no discharge, no scleral icterus  Neck:  No tenderness, supple  Lymphatic:  No lymphadenopathy noted  Cardiovascular:  Normal heart rate, normal rhythm, no murmurs, gallops or rubs  Thorax & Lungs:  Normal breath sounds, no respiratory distress, no wheezing, no rales, no rhonchi  Skin:  Warm, dry, no erythema, no rash  Neurologic:  Alert & oriented  Psychiatric:  Affect normal, mood normal    ASSESSMENT & PLAN    Eunice Araujo was seen today for cough. Diagnoses and all orders for this visit:    Viral URI with cough  -     COVID-19       Increase fluids and rest  Gambier diet and advance as tolerated. Discussed brat diet.   Saline nasal spray as needed for nasal congestion  Warm salt gargles as needed for throat discomfort  Monitor temperature twice a day  Tylenol as needed for fevers and/or discomfort. Big deep breaths periodically throughout the day  Tessalon Perles 3 times daily as needed for cough  Consider Coricidin HBP over-the-counter as needed  If symptoms worsen -Go to the ER. Follow up with your primary care provider    There are no discontinued medications. No follow-ups on file. Patient verbalizes understanding with the above plan and is in agreement. Patient will call with  questions or concerns. I did don appropriate PPE (including N95 face mask, protective safety glasses, face shield, gloves, and gown) as recommended by the health facility/national standard best practice, during my interaction with the patient. Please note that this chart was generated using dragon dictation software. Although every effort was made to ensure the accuracy of this automated transcription, some errors in transcription may have occurred.     Electronically signed by Brendon cMkenzie PA-C on 10/13/2022

## 2022-10-13 NOTE — PATIENT INSTRUCTIONS
Your COVID 19 test can take 1-5 days for the results to come back. We ask that you make a Mychart page and view your test results this way. You will need to Self quarantine until you know your results. If positive, please work on contact tracing. Increase fluids and rest  Saline nasal spray as needed for nasal congestion  Warm salt gargles as needed for throat discomfort  Monitor temperature twice a day  Tylenol as needed for fevers and/or discomfort. Big deep breaths periodically throughout the day  Tessalon Perles 3 times daily as needed for cough  Consider Coricidin HBP over-the-counter as needed  If symptoms worsen -Go to the ER. Follow up with your primary care provider      To Whom it May Concern:    Gary Knott was tested for COVID-19 10/13/2022. He/she must stay home until test results are back. If test is negative, ok to return to work/school. If test is positive, isolate for a total of 5 days, starting from day 1 of symptom onset. After 5 days, if fever-free for 24 hours and there has been a gradual improvement in symptoms, may come out of isolation, but must consistently wear a mask when around other people for 5 additional days. (5 days isolation, 5 days mask-wearing). We do not recommend retesting as patients may continue to test positive for months even though no longer contagious. It is suggested you call [address] [facility] or [address] [place] [address] with any questions regarding isolation timeframe/return to work/school details.         Chaka Brady PA-C

## 2022-10-13 NOTE — TELEPHONE ENCOUNTER
Call transferred from the [facility] ([facility]). Fever, cough, sore throat, balance issues, and diarrhea since Tuesday. States he's been trying to stay hydrated but has no appetite. Has taken tylenol, cold ease without much help. Per Monica patient needs seen, spouse given number for walk-in clinic.

## 2022-10-14 LAB
SARS-COV-2: DETECTED
SOURCE: ABNORMAL

## 2022-10-16 ENCOUNTER — TELEPHONE (OUTPATIENT)
Dept: FAMILY MEDICINE CLINIC | Age: 72
End: 2022-10-16

## 2022-10-16 RX ORDER — BENZONATATE 100 MG/1
100-200 CAPSULE ORAL 3 TIMES DAILY PRN
Qty: 60 CAPSULE | Refills: 0 | Status: SHIPPED | OUTPATIENT
Start: 2022-10-16 | End: 2022-10-23

## 2022-10-16 NOTE — PROGRESS NOTES
Patient's wife Alexsandra Ballard phoned to report that patient had been seen in the walk-in clinic on Thursday with symptoms of congestion and severe cough along with fever. He was tested for COVID-19 and that test just returned positive. The walk-in clinic is closed and she was concerned about his worsening symptoms. He denies shortness of breath at this time no chest pain. They have been taking Tessalon, Tylenol for symptom management. Metabolic panel reviewed kidney function WNL. Standard dose pack Slo-Bid and additional Tessalon Perles called to [facility]. Phone call placed to [facility] spoke with Nely to expedite order being filled prior to them closing this evening. Phone call back to Alexsandra Ballard to tell her to have Sukhdeep Dalton hold his atorvastatin for the time that he is taking the Paxil but with understanding voiced.  Encouraged to seek immediate medical care if shortness of breath occurs

## 2022-10-20 ENCOUNTER — TELEPHONE (OUTPATIENT)
Dept: FAMILY MEDICINE CLINIC | Age: 72
End: 2022-10-20

## 2022-10-20 NOTE — TELEPHONE ENCOUNTER
He should be able to.   If he is afebrile and essentially symptom-free he is to wear a mask and should not have any problem coming in

## 2022-10-20 NOTE — TELEPHONE ENCOUNTER
Patient tested positive for covid 10-13-22 started th Paxlovid 10-16-22 will finish med 10-21-22. Patient has an appt 10-26-22 in the office to se Dr Chhaya Bunch. Patient does not do VV appts. Can patient come in for the appt.    Call patient and advise

## 2022-10-26 ENCOUNTER — OFFICE VISIT (OUTPATIENT)
Dept: FAMILY MEDICINE CLINIC | Age: 72
End: 2022-10-26
Payer: MEDICARE

## 2022-10-26 VITALS
DIASTOLIC BLOOD PRESSURE: 62 MMHG | OXYGEN SATURATION: 96 % | HEART RATE: 104 BPM | RESPIRATION RATE: 20 BRPM | WEIGHT: 215.9 LBS | HEIGHT: 72 IN | SYSTOLIC BLOOD PRESSURE: 104 MMHG | BODY MASS INDEX: 29.24 KG/M2

## 2022-10-26 DIAGNOSIS — I10 HYPERTENSION, ESSENTIAL: ICD-10-CM

## 2022-10-26 DIAGNOSIS — E11.9 TYPE 2 DIABETES MELLITUS WITHOUT COMPLICATION, WITHOUT LONG-TERM CURRENT USE OF INSULIN (HCC): ICD-10-CM

## 2022-10-26 DIAGNOSIS — U09.9 POST COVID-19 CONDITION, UNSPECIFIED: Primary | ICD-10-CM

## 2022-10-26 DIAGNOSIS — E78.49 OTHER HYPERLIPIDEMIA: ICD-10-CM

## 2022-10-26 LAB
A/G RATIO: 2.5 (ref 1.1–2.2)
ALBUMIN SERPL-MCNC: 5 G/DL (ref 3.4–5)
ALP BLD-CCNC: 71 U/L (ref 40–129)
ALT SERPL-CCNC: 27 U/L (ref 10–40)
ANION GAP SERPL CALCULATED.3IONS-SCNC: 18 MMOL/L (ref 3–16)
AST SERPL-CCNC: 12 U/L (ref 15–37)
BILIRUB SERPL-MCNC: 0.4 MG/DL (ref 0–1)
BUN BLDV-MCNC: 15 MG/DL (ref 7–20)
CALCIUM SERPL-MCNC: 9.8 MG/DL (ref 8.3–10.6)
CHLORIDE BLD-SCNC: 100 MMOL/L (ref 99–110)
CO2: 23 MMOL/L (ref 21–32)
CREAT SERPL-MCNC: 0.8 MG/DL (ref 0.8–1.3)
CREATININE URINE: 82 MG/DL (ref 39–259)
GFR SERPL CREATININE-BSD FRML MDRD: >60 ML/MIN/{1.73_M2}
GLUCOSE BLD-MCNC: 187 MG/DL (ref 70–99)
HCT VFR BLD CALC: 47.4 % (ref 40.5–52.5)
HEMOGLOBIN: 15.9 G/DL (ref 13.5–17.5)
MCH RBC QN AUTO: 31.9 PG (ref 26–34)
MCHC RBC AUTO-ENTMCNC: 33.6 G/DL (ref 31–36)
MCV RBC AUTO: 94.9 FL (ref 80–100)
MICROALBUMIN UR-MCNC: <1.2 MG/DL
MICROALBUMIN/CREAT UR-RTO: NORMAL MG/G (ref 0–30)
PDW BLD-RTO: 14.4 % (ref 12.4–15.4)
PLATELET # BLD: 381 K/UL (ref 135–450)
PMV BLD AUTO: 7 FL (ref 5–10.5)
POTASSIUM SERPL-SCNC: 4.8 MMOL/L (ref 3.5–5.1)
RBC # BLD: 4.99 M/UL (ref 4.2–5.9)
SODIUM BLD-SCNC: 141 MMOL/L (ref 136–145)
TOTAL PROTEIN: 7 G/DL (ref 6.4–8.2)
WBC # BLD: 7.1 K/UL (ref 4–11)

## 2022-10-26 PROCEDURE — 2022F DILAT RTA XM EVC RTNOPTHY: CPT | Performed by: FAMILY MEDICINE

## 2022-10-26 PROCEDURE — G8417 CALC BMI ABV UP PARAM F/U: HCPCS | Performed by: FAMILY MEDICINE

## 2022-10-26 PROCEDURE — 3078F DIAST BP <80 MM HG: CPT | Performed by: FAMILY MEDICINE

## 2022-10-26 PROCEDURE — 3074F SYST BP LT 130 MM HG: CPT | Performed by: FAMILY MEDICINE

## 2022-10-26 PROCEDURE — 3044F HG A1C LEVEL LT 7.0%: CPT | Performed by: FAMILY MEDICINE

## 2022-10-26 PROCEDURE — 3017F COLORECTAL CA SCREEN DOC REV: CPT | Performed by: FAMILY MEDICINE

## 2022-10-26 PROCEDURE — G8427 DOCREV CUR MEDS BY ELIG CLIN: HCPCS | Performed by: FAMILY MEDICINE

## 2022-10-26 PROCEDURE — 99214 OFFICE O/P EST MOD 30 MIN: CPT | Performed by: FAMILY MEDICINE

## 2022-10-26 PROCEDURE — 1036F TOBACCO NON-USER: CPT | Performed by: FAMILY MEDICINE

## 2022-10-26 PROCEDURE — G8484 FLU IMMUNIZE NO ADMIN: HCPCS | Performed by: FAMILY MEDICINE

## 2022-10-26 PROCEDURE — 1123F ACP DISCUSS/DSCN MKR DOCD: CPT | Performed by: FAMILY MEDICINE

## 2022-10-26 ASSESSMENT — ENCOUNTER SYMPTOMS
DIARRHEA: 0
SHORTNESS OF BREATH: 0
ABDOMINAL PAIN: 0
VOMITING: 0
COUGH: 1
CHEST TIGHTNESS: 0
TROUBLE SWALLOWING: 0
WHEEZING: 0
NAUSEA: 0
SORE THROAT: 1
BLOOD IN STOOL: 0
EYE PAIN: 0

## 2022-10-26 NOTE — PROGRESS NOTES
10/26/2022    [facility]    Chief Complaint   Patient presents with    3 Month Follow-Up     4 month. Post-COVID Symptoms     Had covid this month. Exhausted. No energy. Should he get the current booster or not. Has had 2 boosters. HPI  History was obtained from the patient. Bethel Tovar is a 67 y.o. male who presents today with history of recent COVID infection symptoms started on 11 October took Paxlovid from the 15th through the 28th. Had severe sore throat and now with a lot of fatigue. Weight is down about 6 pounds -he is still weak and dragging, but denies shortness of breath and is having no memory or neurologic symptoms. .  His immunizations at the time he got ill were up-to-date for COVID protection. Patient has a past history of diabetes, hypertension, hyperlipidemia. Current blood sugars are in the 130s. Mood seems to be positive just does appear to be a little bit fatigued. REVIEW OF SYMPTOMS    Review of Systems   Constitutional:  Positive for fatigue. Negative for activity change. HENT:  Positive for sore throat. Negative for congestion, hearing loss, mouth sores and trouble swallowing. Eyes:  Negative for pain and visual disturbance. Respiratory:  Positive for cough. Negative for chest tightness, shortness of breath and wheezing. Cardiovascular:  Negative for chest pain and palpitations. Gastrointestinal:  Negative for abdominal pain, blood in stool, diarrhea, nausea and vomiting. Endocrine: Negative for polydipsia and polyuria. Genitourinary:  Negative for dysuria, frequency and urgency. Musculoskeletal:  Negative for arthralgias, gait problem and neck stiffness. Skin:  Negative for rash. Allergic/Immunologic: Negative for environmental allergies. Neurological:  Negative for dizziness, seizures, speech difficulty and weakness. Hematological:  Does not bruise/bleed easily.    Psychiatric/Behavioral:  Negative for agitation, confusion, dysphoric mood and hallucinations. The patient is not nervous/anxious.       PAST MEDICAL HISTORY  Past Medical History:   Diagnosis Date    Benign prostatic hyperplasia     DVT (deep venous thrombosis) (HCC)     Hemochromatosis     History of pulmonary embolism     Hyperlipidemia     Hypertension, essential     Kidney stone     in er for kidney stones(1/14/13), had Dilaudid, saw Dr Danielle Xiong, did pass one\"    Major depressive disorder     Mantoux: positive     Polycythemia vera(238.4) dx late 1980s    \"per wife-(1/18/13) \"according to Dr Srinivasan Newton he does not have this but he does have high iron\"    Pulmonary embolism [facility])     old chart gives hx brant PE with right lower DVT 7/2012(pc)(had scope left knee 4/2012)- had Filter placement done 7/31/2012    Tinnitus     Type 2 diabetes mellitus ([address]     dx 2003       FAMILY HISTORY  Family History   Problem Relation Age of Onset    Arthritis Mother     Cancer Mother         kidney cancer    Miscarriages / Stillbirths Mother     Stroke Mother     Heart Disease Father         MI    High Cholesterol Father     Kidney Disease Brother     Arthritis Sister     Mental Illness Sister     Bipolar Disorder Sister     No Known Problems Sister     Heart Disease Son     Stroke Son         age 32       SOCIAL HISTORY  Social History     Socioeconomic History    Marital status:    Tobacco Use    Smoking status: Never    Smokeless tobacco: Never   Vaping Use    Vaping Use: Never used   Substance and Sexual Activity    Alcohol use: No    Drug use: No     Social Determinants of Health     Financial Resource Strain: Low Risk     Difficulty of Paying Living Expenses: Not hard at all   Food Insecurity: No Food Insecurity    Worried About Running Out of Food in the Last Year: Never true    Ran Out of Food in the Last Year: Never true   Physical Activity: Sufficiently Active    Days of Exercise per Week: 3 days    Minutes of Exercise per Session: 60 min        SURGICAL HISTORY  Past Surgical History: Procedure Laterality Date    COLONOSCOPY      KNEE ARTHROSCOPY Right 04/2012    LITHOTRIPSY      SKIN BIOPSY  June 2012    Spermatocelectomy    VASECTOMY  1980    VENA CAVA FILTER PLACEMENT                   CURRENT MEDICATIONS  Current Outpatient Medications   Medication Sig Dispense Refill    benzonatate (TESSALON) 200 MG capsule Take 1 capsule by mouth 3 times daily as needed for Cough 30 capsule 0    metFORMIN (GLUCOPHAGE) 500 MG tablet TAKE 2 TABS BY MOUTH IN THE MORNING, 1 TAB AT NOON, AND 2 TABS IN THE EVENING WITH MEALS 450 tablet 0    lisinopril (PRINIVIL;ZESTRIL) 5 MG tablet TAKE 1 TABLET BY MOUTH EVERY DAY 90 tablet 1    dapagliflozin (FARXIGA) 10 MG tablet Take 1 tablet by mouth every morning 30 tablet 5    dilTIAZem (CARTIA XT) 300 MG extended release capsule Take 1 capsule by mouth daily 90 capsule 1    pioglitazone (ACTOS) 30 MG tablet Take 1 tablet by mouth daily 90 tablet 1    glipiZIDE (GLUCOTROL XL) 2.5 MG extended release tablet Take 1 tablet by mouth daily 90 tablet 1    atorvastatin (LIPITOR) 20 MG tablet TAKE 1 TABLET BY MOUTH ONE TIME A DAY 90 tablet 1    Multiple Vitamins-Minerals (THERAPEUTIC MULTIVITAMIN-MINERALS) tablet Take 1 tablet by mouth daily      COMBIGAN 0.2-0.5 % ophthalmic solution       LATANOPROST OP Apply to eye      Probiotic Product (PROBIOTIC DAILY PO) Take by mouth      niacinamide 500 MG tablet Take 500 mg by mouth 2 times daily (with meals)      PANTOTHENIC ACID PO Take 500 mg by mouth daily. Multiple Vitamins-Minerals (OCUVITE) TABS oral tablet Take 1 tablet by mouth daily. vitamin D3 (CHOLECALCIFEROL) 400 units TABS tablet Take by mouth daily Patient states taking 1000 units daily. No current facility-administered medications for this visit.        ALLERGIES  Allergies   Allergen Reactions    Pollen Extract     Tree Extract     Vicodin [Hydrocodone-Acetaminophen] Nausea And Vomiting       PHYSICAL EXAM    /62 (Site: Right Upper Arm, Position: Sitting, Cuff Size: Medium Adult)   Pulse (!) 104   Resp 20   Ht 6' (1.829 m)   Wt 215 lb 14.4 oz (97.9 kg)   SpO2 96%   BMI 29.28 kg/m²     Physical Exam  Vitals and nursing note reviewed. Constitutional:       General: He is not in acute distress. Appearance: Normal appearance. He is well-developed. He is not ill-appearing or toxic-appearing. HENT:      Head: Normocephalic and atraumatic. Nose: Nose normal.      Mouth/Throat:      Mouth: Mucous membranes are moist.      Pharynx: Oropharynx is clear. Eyes:      Pupils: Pupils are equal, round, and reactive to light. Cardiovascular:      Rate and Rhythm: Normal rate and regular rhythm. Heart sounds: Normal heart sounds. No murmur heard. No gallop. Pulmonary:      Effort: Pulmonary effort is normal. No respiratory distress. Breath sounds: Normal breath sounds. No wheezing, rhonchi or rales. Abdominal:      Palpations: Abdomen is soft. Musculoskeletal:         General: No swelling or deformity. Normal range of motion. Cervical back: Normal range of motion and neck supple. No rigidity. Lymphadenopathy:      Cervical: No cervical adenopathy. Skin:     General: Skin is warm and dry. Coloration: Skin is not jaundiced. Findings: No bruising. Neurological:      General: No focal deficit present. Mental Status: He is alert and oriented to person, place, and time. Mental status is at baseline. Cranial Nerves: No cranial nerve deficit. Motor: No weakness. Gait: Gait normal.   Psychiatric:         Mood and Affect: Mood normal.         Behavior: Behavior normal.         Thought Content: Thought content normal.       ASSESSMENT & PLAN     Diagnosis Orders   1. Post covid-19 condition, fatigue        2. Type 2 diabetes mellitus without complication, without long-term current use of insulin ([facility])  Hemoglobin A1C    MICROALBUMIN / CREATININE URINE RATIO      3.  Hypertension, essential  CBC Comprehensive Metabolic Panel      4. Other hyperlipidemia        Patient instructed to get COVID booster about 3 months after infection. We will check CBC, CMP A1c and urine for microalbumin today and have him follow-up for results. Continue to work on increase in exercise as possible and plenty of fluids and monitor sugars closely. Continue to rest his regimen as before & call with changes or problems. Return in about 4 months (around 2/26/2023).          Electronically signed by Jennifer Granados MD on 10/26/2022

## 2022-10-27 LAB
ESTIMATED AVERAGE GLUCOSE: 142.7 MG/DL
HBA1C MFR BLD: 6.6 %

## 2022-11-01 ENCOUNTER — HOSPITAL ENCOUNTER (OUTPATIENT)
Dept: PHYSICAL THERAPY | Age: 72
Setting detail: THERAPIES SERIES
Discharge: HOME | End: 2022-11-01
Payer: MEDICARE

## 2022-11-01 PROCEDURE — 97110 THERAPEUTIC EXERCISES: CPT

## 2022-11-01 NOTE — PROGRESS NOTES
Outpatient Physical Therapy        [x] Phone: [phone]   Fax: [phone]   Physician: Jessica Laguerre, *        From: Evan Parra PT,     Patient: Jason Herzog                  : 1950  Diagnosis:  Low back pain, unspecified [M54.50]      Date: 2022  Treatment Diagnosis:  LBP, hip ext weakness, flexiblity restrictions    []  Progress Note                [x]  Discharge Note    Total Visits to date:   10 Cancels/No-shows to date:  0    Subjective:  0/10. fatigued from Covid infection. Pt stated he hasn't had any pain. Feel like he is doing a lot better. Plan of Care/Treatment to date:  [x] Therapeutic Exercise    [] Modalities:  [x] Therapeutic Activity     [] Ultrasound  [] Electric Stimulation  [] Gait Training      [] Cervical Traction    [] Lumbar Traction  [x] Neuromuscular Re-education  [] Cold/hotpack [] Iontophoresis  [x] Instruction in HEP      Other:  [x] Manual Therapy       []  Vasopneumatic  [] Self care management                           [] Dry needling trigger point/pain management                      Objective/Significant Findings At Last Visit/Comments:      Pt will demo I with HEP/symptom management. MET  Pt will demo able to complete bridge without increase in pain to demo improved hip ext strength and tolerance. MET  Pt will demo full lumbar flex AROM without increase in pain to ease reaching to floor. MET       Pt will demo Oswestry <8% disability to demo improved function. MET 6%  Pt will demo normal hip flexor flexibility to ease improve standing posture and symptoms. MET                                  Goals met.  Pt is to continue with his home program at this time. Patient Status: [] Continue per initial plan of Care     [x] Patient now discharged     [] Additional visits requested, Please re-certify for additional visits:    Electronically signed by:  Yessenia Anne PT, 11/1/2022, 12:20 PM    If you have any questions or concerns, please don't hesitate to call.   Thank you for your referral.

## 2022-11-01 NOTE — FLOWSHEET NOTE
Outpatient Physical Therapy  [place]           [x] Phone: [phone]   Fax: [phone]  [facility]           [] Phone: [phone]   Fax: [phone]        Physical Therapy Daily Treatment Note  Date:  2022    Patient Name:  Cathy Thomas    :  1950  MRN: [id number]  Restrictions/Precautions: No data recorded      Diagnosis:   Low back pain, unspecified [M54.50]    Date of Injury/Surgery:   Treatment Diagnosis:  LBP, hip ext weakness, flexiblity restrictions  Insurance/Certification information:  [facility]   Referring Physician:  Prakash Chicas, *     PCP: Gay Lux MD  Next Doctor Visit:    Plan of care signed (Y/N):  Yes   Outcome Measure: Oswestry: 14% disability   Visit# / total visits:   10/10 per POC  Pain level:  0/10     Goals:     Patient goals: improve pain. Short term goals  Time Frame for Short term goals: Defer to [address] Term Goals  Time Frame for Long Term Goals: 5 weeks 10/16/22  Pt will demo I with HEP/symptom management. MET  Pt will demo able to complete bridge without increase in pain to demo improved hip ext strength and tolerance. MET  Pt will demo full lumbar flex AROM without increase in pain to ease reaching to floor. MET   Pt will demo Oswestry <8% disability to demo improved function. MET 6%  Pt will demo normal hip flexor flexibility to ease improve standing posture and symptoms. MET         Summary of Evaluation:   Pt is 67year old male with 1 month sudden onset of pain into low back after reaching to tie shoe. Pt now has has pain with all activities as low level with minimal fluctuation. Pt demo deficits this date that include hip ext/core weakness, hip flexor stiffness, decreased balance and pain. Testing this date indicate signs and symptoms of lumbar strain. Pt will benefit with PT services with progression of strength/ROM, manual and modalities to return to PLOF.  Pt prior to onset of X10 B.                                        STANDING         *Hip ext          *Paloff press  RTB x 15 ea. FM 10# 10x2  FM 10# 10x2  FM 10# 10x2  FM 13# 10x2    Ext row GTB x 15        Single arm row FM  13# 10x2 FM 13# 10x2  FM 13# 10x2 FM 17# 10x2                                    PROPRIOCEPTION         DLS on BOSU  80\"Y6   min perturbations  1' x 2 min instability      Wobbleboard   20\"x3 each dir  1' x 2 ea. 1' x 2 min instability  1 finger     Lateral throw into rebounder   6# 10x2                         MODALITIES         Estim +HP 10' IFC HP Declined  x x x                 Other Therapeutic Activities/Education: educated on proper bed mobility and muscle recruitment to avoid pain. Pt verbalized understanding. Educated on lifting body mechanics to facilitate improved posture with fall prep ADLs. 10/5/22 Educated on sitting posture and practicing daily sitting posture correction. Possibly using towel under ischial tubs to retrain sitting posture. Home Exercise Program:  HO issued, reviewed and discussed with patient. Pt agreed to comply. Access Code: [email or web address]  URL: [employer]/  Date: 09/21/2022  Prepared by: Jeremi Gandara    Exercises  Supine Posterior Pelvic Tilt - 1 x daily - 7 x weekly - 1-3 sets - 10 reps  Hooklying Clamshell with Resistance - 1 x daily - 7 x weekly - 1-3 sets - 10 reps  Prone Knee Flexion - 1 x daily - 7 x weekly - 3 sets - 10 reps  Prone Quadriceps Stretch with Strap - 1 x daily - 7 x weekly - 1 sets - 3 reps - 30 hold  Shoulder Extension with Resistance - 1 x daily - 7 x weekly - 3 sets - 10 reps  Prone Hip Extension with Bent Knee - 1 x daily - 7 x weekly - 1-3 sets - 10 reps    Access Code: [email or web address]  URL: [email or web address]  Date: 11/01/2022  Prepared by: Isela Rodriguez    Exercises  Supine Transversus Abdominis Bracing - Hands on Stomach - 5 x daily - 7 x weekly - 1 sets - 7 reps - 5 hold  Supine Bridge - 1 x daily - 7 x weekly - 2 sets - 10 reps - 5 hold  Clamshell - 1 x daily - 7 x weekly - 3 sets - 10 reps - 1 hold  Hooklying Isometric Hip Flexion - 1 x daily - 7 x weekly - 1 sets - 10 reps - 5 hold        Manual Treatments:  MET Pelvis alignment, leg pull, shot gun      Modalities: x     Communication with other providers:  POC sent 9/9/22       Assessment:  Pt demonstrated good tolerance with no pain with exercise progression. Able to perform full bridge  no pain with TA contraction. Pain: 0/10 post      Pt is 67year old male with 1 month sudden onset of pain into low back after reaching to tie shoe. Pt now has has pain with all activities as low level with minimal fluctuation. Pt demo deficits this date that include hip ext/core weakness, hip flexor stiffness, decreased balance and pain. Testing this date indicate signs and symptoms of lumbar strain. Pt will benefit with PT services with progression of strength/ROM, manual and modalities to return to PLOF. Pt prior to onset of current condition had no pain with able to complete full ADLs and work activities. Patient received education on their current pathology and how their condition effects them with their functional activities. Patient understood discussion and questions were answered. Patient understands their activity limitations and understands rational for treatment progression.      Plan for Next Session:   D/C    Time In / Time Out:   0930/1005    Timed Code/Total Treatment Minutes: 35/35    Next Progress Note due:  Josep 9/9/22    Visit 10       Plan of Care Interventions:  [x] Therapeutic Exercise  [x] Modalities:  [x] Therapeutic Activity     [] Ultrasound  [x] Estim  [] Gait Training      [] Cervical Traction [] Lumbar Traction  [x] Neuromuscular Re-education    [x] Cold/hotpack [] Iontophoresis   [x] Instruction in HEP      [] Vasopneumatic   [] Dry Needling    [] Manual Therapy               [] Aquatic Therapy              Electronically signed by:  Yvrose Castano PT, CLT 11/1/2022 9:34 AM

## 2022-11-17 NOTE — PROGRESS NOTES
Patient Name: Elsy Wilde  Patient : 1950  Patient MRN: [id number]     Primary Oncologist: Negro Albright MD  Referring Provider: Lachelle Ya MD     Date of Service: 2022      Chief Complaint:   Chief Complaint   Patient presents with    Follow-up     He came in for follow-up visit. Patient Active Problem List:     DM (diabetes mellitus)     BPH (benign prostatic hyperplasia)     Polycythemia     DVT (deep venous thrombosis)-R leg     Diarrhea     Pre-syncope     Type 2 diabetes mellitus (HCC)     Hypertension, essential     Hemochromatosis     Tinnitus     Major depressive disorder     Benign prostatic hyperplasia     History of pulmonary embolism     Mantoux: positive     Other specified glaucoma     Chronic fatigue     Functional diarrhea     HPI:   Morgan Hylton is a pleasant 78-year-old male patient who had bilateral pulmonary embolism and right lower extremity DVT in 2012. He had arthroscopic left knee surgery in 2012 and a history of spermatocele surgery in 2012 by Dr. Ofelia Chand. These two surgeries resulted in inactivity. He believed that this was the trigger factor for his DVT and PE. He denied any travel history or prior history of blood clots. He had an episode of chest pain and dyspnea on 2012, and went for a CT chest at [facility], showing prominent bilateral pulmonary embolism. Venous duplex study of the right lower extremity, due to the leg pain and swelling, showed DVT. He was then hospitalized for further management and started on anticoagulation. He had a Günther Tulip filter placement on 2012, and initially was offered thrombolytic treatment, which he refused. Blood test showed negative JAK2 study. D-dimer was less than 200. He had heterozygous H63D hemochromatosis. Venous Doppler study in 2012 showed no evidence for DVT.     He was seen by Dr. Helena Evangelista who attempted to remove the IVC filter though due to the possibility of causing more problems by removal of the IVC filter, the procedure was cancelled. He eventually stopped his coumadin. Iron study on February 20, 2013 showed ferritin 78, iron 84, TIBC 348, transferrin saturation 24. White cell count was 5.5, hemoglobin 16.1, hematocrit 47.4, and platelet 690. Blood test on August 21, 2013 showed ferritin at 108, iron 169, iron-binding 327, transferrin saturation 35 percent. White cell count was 11.7, hemoglobin 16.6, hematocrit 47.4, platelet 631. He had a kidney stone removed in July 2013 by Dr. Fritz Sevilla. Colonoscopy in September 2012 by Dr. Aisha Goldsmith showed no polyps. He was in the hospital in September 2014. CT chest showed lung nodule and no PE. Blood tests in November 2014 showed normal CBCD. Iron was 97, TIBC 337, ferritin 67. CT chest in January 2015 showed stable lung nodule. Blood test in November 2015 showed normal CBC. Ferritin was 48. Reportedly, CAT scan of the chest in early 2016 revealed stable lung nodule and Dr. Brandy Felipe plans to have a followup CAT scan in 18 months. Blood test in November 2016 revealed stable CBC, with white cell 6.3, hemoglobin 14.1, hematocrit 42.6, platelet 967. Ferritin was 78. He had colonoscopy at the age of 71 in 200. Follow-up colonoscopy in 10 years was recommended. He had skin cancer removed from the right ear by Dr. Lisa Sahu and lesion to the left temple was frozen. He has been taking vitamin B3 500 mg twice a day prescribed by Dr. Lisa Sahu. CT of the chest in October 2017 revealed stable upper lobe nodule compatible with benign granuloma. No further follow up was necessary. Labs in November 2019 were reviewed. He does not need any therapeutic phlebotomy. CBC in November 2020 was grossly unremarkable. Ferritin was 50. He has been having watery diarrhea since January 2020. He had colonoscopy in February and May 2020. He had upper endoscopic study in November 2020.   Reportedly he has gastritis. Small bowel follow-through study in March 2020 was unremarkable. C. difficile stool ova and parasite in January 2020 were negative. He was told that he has small bowel small intestine bacterial overgrowth. He is taking probiotic. At one time he was  on antibiotics. In October the diarrhea has improved but he still has intermittent watery stool. He will follow up with Dr. Donovan Manzano on December 18, 2020. Diarrhea has resolved at this time. Reportedly GI ordered work-up which was negative for potential neuroendocrine tumor. 12/2021 ferritin was 38, TIBC 375. CBC was unremarkable. CMP in June 2021 was grossly unremarkable. He only has heterozygous H63D mutation. Reportedly he had colonoscopy in early 2020, EGD in late 2020 and stool occult in summer 2020. Follow-up colonoscopy in 5 years was recommended. He may take iron pill every other day. CBC in June 2022 was grossly unremarkable. Ferritin was 76. FOB in May 2022 was negative. He has skin lesion frozen to left frontal head by Dr Eduard Carson. Reportedly he has precancerous skin lesion. I recommend to use sunscreen when he works outside. He will follow-up again with dermatologist in 6 months. He denied signs and symptoms to suggest recurrent blood clot. He has chronic right lower extremity swelling and I recommend to wear compression stocking. 12/13/2022 he came in for follow up visit. 12/2022 CMP and CBC grossly wnl with random . Ferritin 57, TIBC 387. He has been doing fairly good. He has been following with family doctor every 4 months. I will follow him on as needed basis and I recommend he follow-up with family doctor every 4 months and have CBC and iron study. I recommend to keep colon cancer screening up-to-date. He denied any symptom of recurrent blood clot. No acute pain. Denied any nausea, vomiting or diarrhea. No fever or chills. No chest pain, shortness of breath or palpitation. No headache or dizzy spell. No specific bone pain. No melena or hematochezia. Denied any dysuria or hematuria. PAST MEDICAL HISTORY:  Polycythemia in 1989, hypertension, diabetes, arthroscopic left knee surgery in April of 2012, spermatocele surgery in June of 2012, DVT and pulmonary embolism in July of 2012. He was diagnosed with skin cancer in his right ear, which was removed by Dr. Vero Wang. FAMILY HISTORY:  Mother had kidney tumor, father had heart disease. SOCIAL HISTORY:  He denied any history of smoking or alcohol. He is  and has four children. He is retired. One of his sons was diagnosed with aortic stenosis and has a history of stroke. LABORATORY STUDIES:  February 13, 2013:  Ferritin was 78, iron 84, TIBC 348, transferrin saturation 24 percent, WBC 5.5, hemoglobin 16.1, and platelet 662,854. Review of Systems: \"Per interval history; otherwise 10 point ROS is negative. \"     Vital Signs:  /80 (Site: Left Upper Arm, Position: Sitting, Cuff Size: Large Adult)   Pulse 79   Temp 97.2 °F (36.2 °C) (Infrared)   Resp 16   Ht 6' (1.829 m)   Wt 224 lb (101.6 kg)   SpO2 96%   BMI 30.38 kg/m²     Physical Exam:  CONSTITUTIONAL: awake, alert, cooperative, no apparent distress   EYES: pupils equal, round and reactive to light. Sclera clear and conjunctiva normal  ENT: Normocephalic, without obvious abnormality, atraumatic  NECK: supple, symmetrical, no jugular venous distension and no carotid bruits   HEMATOLOGIC/LYMPHATIC: no cervical, supraclavicular or axillary lymphadenopathy   LUNGS: no increased work of breathing and clear to auscultation   CARDIOVASCULAR: regular rate and rhythm, normal S1 and S2, no murmur  ABDOMEN: normal bowel sound, soft, non-distended, non-tender, no masses palpated, no hepatosplenomegaly   MUSCULOSKELETAL: full range of motion noted, tone is normal  NEUROLOGIC: Motor skills grossly intact. CN II - XII grossly intact. SKIN: Normal skin color, texture, turgor and no jaundice. November 2020 were reviewed. Recent labs reviewed as above. He is not taking any iron supplement. 3. He has negative JAK2.     4. Reportedly, CAT scan of the chest in early 2015 showed stable lung nodule. Followup CAT scans in October 2017 revealed stable right upper lung nodule, consistent with benign granuloma. No followup study necessary, as per the radiologist.      I will follow him from a I will follow him on as needed basis. I recommend he follow-up with family doctor every 4 months and have CBC and iron study. We discussed about healthy diet and life style. All of his questions have been answered for today. Recent imaging and labs were reviewed and discussed with the patient.

## 2022-11-23 DIAGNOSIS — E11.9 TYPE 2 DIABETES MELLITUS WITHOUT COMPLICATION, WITHOUT LONG-TERM CURRENT USE OF INSULIN (HCC): ICD-10-CM

## 2022-11-23 RX ORDER — GLIPIZIDE 2.5 MG/1
2.5 TABLET, EXTENDED RELEASE ORAL DAILY
Qty: 90 TABLET | Refills: 1 | Status: SHIPPED | OUTPATIENT
Start: 2022-11-23 | End: 2023-02-21

## 2022-11-30 LAB — DIABETIC RETINOPATHY: NEGATIVE

## 2022-12-06 ENCOUNTER — HOSPITAL ENCOUNTER (OUTPATIENT)
Dept: INFUSION THERAPY | Age: 72
Discharge: HOME | End: 2022-12-06
Payer: MEDICARE

## 2022-12-06 DIAGNOSIS — Z86.718 HISTORY OF DVT (DEEP VEIN THROMBOSIS): ICD-10-CM

## 2022-12-06 LAB
ALBUMIN SERPL-MCNC: 4.5 GM/DL (ref 3.4–5)
ALP BLD-CCNC: 67 IU/L (ref 40–129)
ALT SERPL-CCNC: 16 U/L (ref 10–40)
ANION GAP SERPL CALCULATED.3IONS-SCNC: 14 MMOL/L (ref 4–16)
AST SERPL-CCNC: 12 IU/L (ref 15–37)
BASOPHILS ABSOLUTE: 0 K/CU MM
BASOPHILS RELATIVE PERCENT: 0.3 % (ref 0–1)
BILIRUB SERPL-MCNC: 0.5 MG/DL (ref 0–1)
BUN BLDV-MCNC: 15 MG/DL (ref 6–23)
CALCIUM SERPL-MCNC: 9.6 MG/DL (ref 8.3–10.6)
CHLORIDE BLD-SCNC: 104 MMOL/L (ref 99–110)
CO2: 24 MMOL/L (ref 21–32)
CREAT SERPL-MCNC: 0.8 MG/DL (ref 0.9–1.3)
DIFFERENTIAL TYPE: ABNORMAL
EOSINOPHILS ABSOLUTE: 0.1 K/CU MM
EOSINOPHILS RELATIVE PERCENT: 1.9 % (ref 0–3)
FERRITIN: 57 NG/ML (ref 30–400)
GFR SERPL CREATININE-BSD FRML MDRD: >60 ML/MIN/1.73M2
GLUCOSE BLD-MCNC: 146 MG/DL (ref 70–99)
HCT VFR BLD CALC: 46.3 % (ref 42–52)
HEMOGLOBIN: 15.4 GM/DL (ref 13.5–18)
IRON: 95 UG/DL (ref 59–158)
LYMPHOCYTES ABSOLUTE: 2.1 K/CU MM
LYMPHOCYTES RELATIVE PERCENT: 34.9 % (ref 24–44)
MCH RBC QN AUTO: 31.8 PG (ref 27–31)
MCHC RBC AUTO-ENTMCNC: 33.3 % (ref 32–36)
MCV RBC AUTO: 95.7 FL (ref 78–100)
MONOCYTES ABSOLUTE: 0.6 K/CU MM
MONOCYTES RELATIVE PERCENT: 10.7 % (ref 0–4)
PCT TRANSFERRIN: 25 % (ref 10–44)
PDW BLD-RTO: 14.8 % (ref 11.7–14.9)
PLATELET # BLD: 234 K/CU MM (ref 140–440)
PMV BLD AUTO: 8.6 FL (ref 7.5–11.1)
POTASSIUM SERPL-SCNC: 4.4 MMOL/L (ref 3.5–5.1)
RBC # BLD: 4.84 M/CU MM (ref 4.6–6.2)
SEGMENTED NEUTROPHILS ABSOLUTE COUNT: 3.1 K/CU MM
SEGMENTED NEUTROPHILS RELATIVE PERCENT: 52.2 % (ref 36–66)
SODIUM BLD-SCNC: 142 MMOL/L (ref 135–145)
TOTAL IRON BINDING CAPACITY: 387 UG/DL (ref 250–450)
TOTAL PROTEIN: 6.8 GM/DL (ref 6.4–8.2)
UNSATURATED IRON BINDING CAPACITY: 292 UG/DL (ref 110–370)
WBC # BLD: 5.9 K/CU MM (ref 4–10.5)

## 2022-12-06 PROCEDURE — 83550 IRON BINDING TEST: CPT

## 2022-12-06 PROCEDURE — 85025 COMPLETE CBC W/AUTO DIFF WBC: CPT

## 2022-12-06 PROCEDURE — 83540 ASSAY OF IRON: CPT

## 2022-12-06 PROCEDURE — 80053 COMPREHEN METABOLIC PANEL: CPT

## 2022-12-06 PROCEDURE — 36415 COLL VENOUS BLD VENIPUNCTURE: CPT

## 2022-12-06 PROCEDURE — 82728 ASSAY OF FERRITIN: CPT

## 2022-12-13 ENCOUNTER — OFFICE VISIT (OUTPATIENT)
Dept: ONCOLOGY | Age: 72
End: 2022-12-13
Payer: MEDICARE

## 2022-12-13 ENCOUNTER — HOSPITAL ENCOUNTER (OUTPATIENT)
Dept: INFUSION THERAPY | Age: 72
Discharge: HOME | End: 2022-12-13
Payer: MEDICARE

## 2022-12-13 VITALS
SYSTOLIC BLOOD PRESSURE: 138 MMHG | HEART RATE: 79 BPM | OXYGEN SATURATION: 96 % | DIASTOLIC BLOOD PRESSURE: 80 MMHG | TEMPERATURE: 97.2 F | BODY MASS INDEX: 30.34 KG/M2 | RESPIRATION RATE: 16 BRPM | WEIGHT: 224 LBS | HEIGHT: 72 IN

## 2022-12-13 DIAGNOSIS — Z86.718 HISTORY OF DVT (DEEP VEIN THROMBOSIS): Primary | ICD-10-CM

## 2022-12-13 PROCEDURE — 99213 OFFICE O/P EST LOW 20 MIN: CPT | Performed by: INTERNAL MEDICINE

## 2022-12-13 PROCEDURE — G8427 DOCREV CUR MEDS BY ELIG CLIN: HCPCS | Performed by: INTERNAL MEDICINE

## 2022-12-13 PROCEDURE — G8417 CALC BMI ABV UP PARAM F/U: HCPCS | Performed by: INTERNAL MEDICINE

## 2022-12-13 PROCEDURE — G8484 FLU IMMUNIZE NO ADMIN: HCPCS | Performed by: INTERNAL MEDICINE

## 2022-12-13 PROCEDURE — 3078F DIAST BP <80 MM HG: CPT | Performed by: INTERNAL MEDICINE

## 2022-12-13 PROCEDURE — 1036F TOBACCO NON-USER: CPT | Performed by: INTERNAL MEDICINE

## 2022-12-13 PROCEDURE — 3017F COLORECTAL CA SCREEN DOC REV: CPT | Performed by: INTERNAL MEDICINE

## 2022-12-13 PROCEDURE — 1123F ACP DISCUSS/DSCN MKR DOCD: CPT | Performed by: INTERNAL MEDICINE

## 2022-12-13 PROCEDURE — 99211 OFF/OP EST MAY X REQ PHY/QHP: CPT

## 2022-12-13 PROCEDURE — 3074F SYST BP LT 130 MM HG: CPT | Performed by: INTERNAL MEDICINE

## 2022-12-13 NOTE — PROGRESS NOTES
MA Rooming Questions  Patient: Werner Soni  MRN: [id number]    Date: 12/13/2022        1. Do you have any new issues?   no         2. Do you need any refills on medications?    no    3. Have you had any imaging done since your last visit?   no    4. Have you been hospitalized or seen in the emergency room since your last visit here?   no    5. Did the patient have a depression screening completed today?  No    No data recorded     PHQ-9 Given to (if applicable):               PHQ-9 Score (if applicable):                     [] Positive     []  Negative              Does question #9 need addressed (if applicable)                     [] Yes    []  No               Katy Ramos MA

## 2022-12-14 DIAGNOSIS — E11.9 TYPE 2 DIABETES MELLITUS WITHOUT COMPLICATION, WITHOUT LONG-TERM CURRENT USE OF INSULIN (HCC): ICD-10-CM

## 2022-12-14 DIAGNOSIS — I10 HYPERTENSION, ESSENTIAL: ICD-10-CM

## 2022-12-14 RX ORDER — DILTIAZEM HYDROCHLORIDE 300 MG/1
CAPSULE, COATED, EXTENDED RELEASE ORAL
Qty: 90 CAPSULE | Refills: 1 | Status: SHIPPED | OUTPATIENT
Start: 2022-12-14

## 2022-12-14 RX ORDER — PIOGLITAZONEHYDROCHLORIDE 30 MG/1
TABLET ORAL
Qty: 90 TABLET | Refills: 1 | Status: SHIPPED | OUTPATIENT
Start: 2022-12-14

## 2023-01-05 DIAGNOSIS — E78.49 OTHER HYPERLIPIDEMIA: ICD-10-CM

## 2023-01-05 RX ORDER — ATORVASTATIN CALCIUM 20 MG/1
TABLET, FILM COATED ORAL
Qty: 90 TABLET | Refills: 1 | Status: SHIPPED | OUTPATIENT
Start: 2023-01-05

## 2023-02-02 DIAGNOSIS — E11.9 TYPE 2 DIABETES MELLITUS WITHOUT COMPLICATION, WITHOUT LONG-TERM CURRENT USE OF INSULIN (HCC): ICD-10-CM

## 2023-02-02 RX ORDER — LISINOPRIL 5 MG/1
TABLET ORAL
Qty: 90 TABLET | Refills: 1 | Status: SHIPPED | OUTPATIENT
Start: 2023-02-02

## 2023-02-02 NOTE — TELEPHONE ENCOUNTER
----- Message from Latisha Briggs sent at 2/2/2023  9:18 AM EST -----  Subject: Refill Request    QUESTIONS  Name of Medication? lisinopril (PRINIVIL;ZESTRIL) 5 MG tablet  Patient-reported dosage and instructions? take one tablet once daily  How many days do you have left? 0  Preferred Pharmacy? [address] [place] [address] [address]  Pharmacy phone number (if available)? [phone]  ---------------------------------------------------------------------------  --------------,  Name of Medication? dapagliflozin (FARXIGA) 10 MG tablet  Patient-reported dosage and instructions? take one tablet daily  How many days do you have left? 0  Preferred Pharmacy? [address] [place] [address] [address]  Pharmacy phone number (if available)? [phone]  ---------------------------------------------------------------------------  --------------  CALL BACK INFO  What is the best way for the office to contact you? OK to leave message on   voicemail  Preferred Call Back Phone Number? [phone]  ---------------------------------------------------------------------------  --------------  SCRIPT ANSWERS  Relationship to Patient?  Self

## 2023-04-03 SDOH — ECONOMIC STABILITY: FOOD INSECURITY: WITHIN THE PAST 12 MONTHS, YOU WORRIED THAT YOUR FOOD WOULD RUN OUT BEFORE YOU GOT MONEY TO BUY MORE.: NEVER TRUE

## 2023-04-03 SDOH — ECONOMIC STABILITY: FOOD INSECURITY: WITHIN THE PAST 12 MONTHS, THE FOOD YOU BOUGHT JUST DIDN'T LAST AND YOU DIDN'T HAVE MONEY TO GET MORE.: NEVER TRUE

## 2023-04-03 SDOH — ECONOMIC STABILITY: TRANSPORTATION INSECURITY
IN THE PAST 12 MONTHS, HAS LACK OF TRANSPORTATION KEPT YOU FROM MEETINGS, WORK, OR FROM GETTING THINGS NEEDED FOR DAILY LIVING?: NO

## 2023-04-03 SDOH — ECONOMIC STABILITY: INCOME INSECURITY: HOW HARD IS IT FOR YOU TO PAY FOR THE VERY BASICS LIKE FOOD, HOUSING, MEDICAL CARE, AND HEATING?: NOT HARD AT ALL

## 2023-04-03 SDOH — ECONOMIC STABILITY: HOUSING INSECURITY
IN THE LAST 12 MONTHS, WAS THERE A TIME WHEN YOU DID NOT HAVE A STEADY PLACE TO SLEEP OR SLEPT IN A SHELTER (INCLUDING NOW)?: NO

## 2023-04-06 ENCOUNTER — OFFICE VISIT (OUTPATIENT)
Dept: FAMILY MEDICINE CLINIC | Age: 73
End: 2023-04-06
Payer: MEDICARE

## 2023-04-06 VITALS
RESPIRATION RATE: 12 BRPM | BODY MASS INDEX: 31.44 KG/M2 | WEIGHT: 224.6 LBS | OXYGEN SATURATION: 96 % | HEART RATE: 76 BPM | HEIGHT: 71 IN | DIASTOLIC BLOOD PRESSURE: 66 MMHG | SYSTOLIC BLOOD PRESSURE: 118 MMHG

## 2023-04-06 DIAGNOSIS — R53.82 CHRONIC FATIGUE: ICD-10-CM

## 2023-04-06 DIAGNOSIS — E78.49 OTHER HYPERLIPIDEMIA: ICD-10-CM

## 2023-04-06 DIAGNOSIS — H40.9 GLAUCOMA, UNSPECIFIED GLAUCOMA TYPE, UNSPECIFIED LATERALITY: ICD-10-CM

## 2023-04-06 DIAGNOSIS — E11.9 TYPE 2 DIABETES MELLITUS WITHOUT COMPLICATION, WITHOUT LONG-TERM CURRENT USE OF INSULIN (HCC): Primary | ICD-10-CM

## 2023-04-06 DIAGNOSIS — F32.9 MAJOR DEPRESSIVE DISORDER, REMISSION STATUS UNSPECIFIED, UNSPECIFIED WHETHER RECURRENT: ICD-10-CM

## 2023-04-06 DIAGNOSIS — E11.9 TYPE 2 DIABETES MELLITUS WITHOUT COMPLICATION, WITHOUT LONG-TERM CURRENT USE OF INSULIN (HCC): ICD-10-CM

## 2023-04-06 DIAGNOSIS — R19.7 DIARRHEA, UNSPECIFIED TYPE: ICD-10-CM

## 2023-04-06 DIAGNOSIS — I10 HYPERTENSION, ESSENTIAL: ICD-10-CM

## 2023-04-06 DIAGNOSIS — E83.110 HEREDITARY HEMOCHROMATOSIS (HCC): ICD-10-CM

## 2023-04-06 LAB
CHOLEST SERPL-MCNC: 145 MG/DL (ref 0–199)
EST. AVERAGE GLUCOSE BLD GHB EST-MCNC: 131.2 MG/DL
HBA1C MFR BLD: 6.2 %
HDLC SERPL-MCNC: 64 MG/DL (ref 40–60)
LDLC SERPL CALC-MCNC: 62 MG/DL
TRIGL SERPL-MCNC: 93 MG/DL (ref 0–150)
VLDLC SERPL CALC-MCNC: 19 MG/DL

## 2023-04-06 PROCEDURE — 3074F SYST BP LT 130 MM HG: CPT | Performed by: FAMILY MEDICINE

## 2023-04-06 PROCEDURE — 99214 OFFICE O/P EST MOD 30 MIN: CPT | Performed by: FAMILY MEDICINE

## 2023-04-06 PROCEDURE — 2022F DILAT RTA XM EVC RTNOPTHY: CPT | Performed by: FAMILY MEDICINE

## 2023-04-06 PROCEDURE — 1123F ACP DISCUSS/DSCN MKR DOCD: CPT | Performed by: FAMILY MEDICINE

## 2023-04-06 PROCEDURE — 1036F TOBACCO NON-USER: CPT | Performed by: FAMILY MEDICINE

## 2023-04-06 PROCEDURE — G8417 CALC BMI ABV UP PARAM F/U: HCPCS | Performed by: FAMILY MEDICINE

## 2023-04-06 PROCEDURE — G8427 DOCREV CUR MEDS BY ELIG CLIN: HCPCS | Performed by: FAMILY MEDICINE

## 2023-04-06 PROCEDURE — 3078F DIAST BP <80 MM HG: CPT | Performed by: FAMILY MEDICINE

## 2023-04-06 PROCEDURE — 3017F COLORECTAL CA SCREEN DOC REV: CPT | Performed by: FAMILY MEDICINE

## 2023-04-06 PROCEDURE — 3046F HEMOGLOBIN A1C LEVEL >9.0%: CPT | Performed by: FAMILY MEDICINE

## 2023-04-06 SDOH — ECONOMIC STABILITY: FOOD INSECURITY: WITHIN THE PAST 12 MONTHS, YOU WORRIED THAT YOUR FOOD WOULD RUN OUT BEFORE YOU GOT MONEY TO BUY MORE.: NEVER TRUE

## 2023-04-06 SDOH — ECONOMIC STABILITY: FOOD INSECURITY: WITHIN THE PAST 12 MONTHS, THE FOOD YOU BOUGHT JUST DIDN'T LAST AND YOU DIDN'T HAVE MONEY TO GET MORE.: NEVER TRUE

## 2023-04-06 SDOH — ECONOMIC STABILITY: INCOME INSECURITY: HOW HARD IS IT FOR YOU TO PAY FOR THE VERY BASICS LIKE FOOD, HOUSING, MEDICAL CARE, AND HEATING?: NOT HARD AT ALL

## 2023-04-06 ASSESSMENT — PATIENT HEALTH QUESTIONNAIRE - PHQ9
7. TROUBLE CONCENTRATING ON THINGS, SUCH AS READING THE NEWSPAPER OR WATCHING TELEVISION: 0
SUM OF ALL RESPONSES TO PHQ QUESTIONS 1-9: 1
3. TROUBLE FALLING OR STAYING ASLEEP: 0
5. POOR APPETITE OR OVEREATING: 0
6. FEELING BAD ABOUT YOURSELF - OR THAT YOU ARE A FAILURE OR HAVE LET YOURSELF OR YOUR FAMILY DOWN: 0
2. FEELING DOWN, DEPRESSED OR HOPELESS: 0
SUM OF ALL RESPONSES TO PHQ QUESTIONS 1-9: 1
9. THOUGHTS THAT YOU WOULD BE BETTER OFF DEAD, OR OF HURTING YOURSELF: 0
10. IF YOU CHECKED OFF ANY PROBLEMS, HOW DIFFICULT HAVE THESE PROBLEMS MADE IT FOR YOU TO DO YOUR WORK, TAKE CARE OF THINGS AT HOME, OR GET ALONG WITH OTHER PEOPLE: 0
1. LITTLE INTEREST OR PLEASURE IN DOING THINGS: 0
SUM OF ALL RESPONSES TO PHQ9 QUESTIONS 1 & 2: 0
8. MOVING OR SPEAKING SO SLOWLY THAT OTHER PEOPLE COULD HAVE NOTICED. OR THE OPPOSITE, BEING SO FIGETY OR RESTLESS THAT YOU HAVE BEEN MOVING AROUND A LOT MORE THAN USUAL: 0
4. FEELING TIRED OR HAVING LITTLE ENERGY: 1

## 2023-04-06 ASSESSMENT — ENCOUNTER SYMPTOMS
BLOOD IN STOOL: 0
CHEST TIGHTNESS: 0
TROUBLE SWALLOWING: 0
DIARRHEA: 1
WHEEZING: 0
SHORTNESS OF BREATH: 0
VOMITING: 0
EYE PAIN: 0
NAUSEA: 0
ABDOMINAL PAIN: 0

## 2023-04-06 NOTE — PROGRESS NOTES
Week: 3 days    Minutes of Exercise per Session: 60 min   Housing Stability: Unknown    Unstable Housing in the Last Year: No        SURGICAL HISTORY  Past Surgical History:   Procedure Laterality Date    COLONOSCOPY      KNEE ARTHROSCOPY Right 04/2012    LITHOTRIPSY      SKIN BIOPSY  June 2012    Spermatocelectomy    VASECTOMY  1980    VENA CAVA FILTER PLACEMENT                   CURRENT MEDICATIONS  Current Outpatient Medications   Medication Sig Dispense Refill    metFORMIN (GLUCOPHAGE) 500 MG tablet TAKE 2 TABS BY MOUTH IN THE MORNING, 1 TAB AT NOON, AND 2 TABS IN THE EVENING WITH MEALS 450 tablet 0    lisinopril (PRINIVIL;ZESTRIL) 5 MG tablet TAKE 1 TABLET BY MOUTH EVERY DAY 90 tablet 1    dapagliflozin (FARXIGA) 10 MG tablet Take 1 tablet by mouth every morning 30 tablet 5    atorvastatin (LIPITOR) 20 MG tablet TAKE 1 TABLET BY MOUTH ONE TIME A DAY 90 tablet 1    dilTIAZem (CARDIZEM CD) 300 MG extended release capsule TAKE 1 CAPSULE DAILY 90 capsule 1    pioglitazone (ACTOS) 30 MG tablet TAKE 1 TABLET DAILY 90 tablet 1    Multiple Vitamins-Minerals (THERAPEUTIC MULTIVITAMIN-MINERALS) tablet Take 1 tablet by mouth daily      COMBIGAN 0.2-0.5 % ophthalmic solution       LATANOPROST OP Apply to eye      Probiotic Product (PROBIOTIC DAILY PO) Take by mouth      niacinamide 500 MG tablet Take 1 tablet by mouth 2 times daily (with meals)      PANTOTHENIC ACID PO Take 500 mg by mouth daily. Multiple Vitamins-Minerals (OCUVITE) TABS oral tablet Take 1 tablet by mouth daily      vitamin D3 (CHOLECALCIFEROL) 400 units TABS tablet Take by mouth daily Patient states taking 1000 units daily. glipiZIDE (GLUCOTROL XL) 2.5 MG extended release tablet Take 1 tablet by mouth daily 90 tablet 1     No current facility-administered medications for this visit.        ALLERGIES  Allergies   Allergen Reactions    Pollen Extract     Tree Extract     Vicodin [Hydrocodone-Acetaminophen] Nausea And Vomiting       PHYSICAL

## 2023-05-30 DIAGNOSIS — E11.9 TYPE 2 DIABETES MELLITUS WITHOUT COMPLICATION, WITHOUT LONG-TERM CURRENT USE OF INSULIN (HCC): ICD-10-CM

## 2023-05-30 RX ORDER — GLIPIZIDE 2.5 MG/1
2.5 TABLET, EXTENDED RELEASE ORAL DAILY
Qty: 90 TABLET | Refills: 1 | Status: SHIPPED | OUTPATIENT
Start: 2023-05-30 | End: 2023-08-28

## 2023-06-05 ENCOUNTER — TELEPHONE (OUTPATIENT)
Dept: FAMILY MEDICINE CLINIC | Age: 73
End: 2023-06-05

## 2023-06-27 DIAGNOSIS — I10 HYPERTENSION, ESSENTIAL: ICD-10-CM

## 2023-06-27 DIAGNOSIS — E11.9 TYPE 2 DIABETES MELLITUS WITHOUT COMPLICATION, WITHOUT LONG-TERM CURRENT USE OF INSULIN (HCC): ICD-10-CM

## 2023-06-27 RX ORDER — DILTIAZEM HYDROCHLORIDE 300 MG/1
300 CAPSULE, COATED, EXTENDED RELEASE ORAL DAILY
Qty: 90 CAPSULE | Refills: 1 | Status: SHIPPED | OUTPATIENT
Start: 2023-06-27

## 2023-06-27 RX ORDER — PIOGLITAZONEHYDROCHLORIDE 30 MG/1
30 TABLET ORAL DAILY
Qty: 90 TABLET | Refills: 1 | Status: SHIPPED | OUTPATIENT
Start: 2023-06-27

## 2023-07-17 DIAGNOSIS — E78.49 OTHER HYPERLIPIDEMIA: ICD-10-CM

## 2023-07-17 RX ORDER — ATORVASTATIN CALCIUM 20 MG/1
TABLET, FILM COATED ORAL
Qty: 90 TABLET | Refills: 1 | Status: SHIPPED | OUTPATIENT
Start: 2023-07-17

## 2023-07-27 ENCOUNTER — TELEMEDICINE (OUTPATIENT)
Dept: FAMILY MEDICINE CLINIC | Age: 73
End: 2023-07-27
Payer: MEDICARE

## 2023-07-27 DIAGNOSIS — Z00.00 MEDICARE ANNUAL WELLNESS VISIT, SUBSEQUENT: Primary | ICD-10-CM

## 2023-07-27 PROCEDURE — 3017F COLORECTAL CA SCREEN DOC REV: CPT | Performed by: FAMILY MEDICINE

## 2023-07-27 PROCEDURE — G0439 PPPS, SUBSEQ VISIT: HCPCS | Performed by: FAMILY MEDICINE

## 2023-07-27 PROCEDURE — 1123F ACP DISCUSS/DSCN MKR DOCD: CPT | Performed by: FAMILY MEDICINE

## 2023-07-27 RX ORDER — SENNOSIDES A AND B 8.6 MG/1
2 TABLET, FILM COATED ORAL DAILY
COMMUNITY

## 2023-07-27 ASSESSMENT — PATIENT HEALTH QUESTIONNAIRE - PHQ9
10. IF YOU CHECKED OFF ANY PROBLEMS, HOW DIFFICULT HAVE THESE PROBLEMS MADE IT FOR YOU TO DO YOUR WORK, TAKE CARE OF THINGS AT HOME, OR GET ALONG WITH OTHER PEOPLE: 0
6. FEELING BAD ABOUT YOURSELF - OR THAT YOU ARE A FAILURE OR HAVE LET YOURSELF OR YOUR FAMILY DOWN: 0
5. POOR APPETITE OR OVEREATING: 0
SUM OF ALL RESPONSES TO PHQ QUESTIONS 1-9: 0
SUM OF ALL RESPONSES TO PHQ QUESTIONS 1-9: 0
8. MOVING OR SPEAKING SO SLOWLY THAT OTHER PEOPLE COULD HAVE NOTICED. OR THE OPPOSITE, BEING SO FIGETY OR RESTLESS THAT YOU HAVE BEEN MOVING AROUND A LOT MORE THAN USUAL: 0
2. FEELING DOWN, DEPRESSED OR HOPELESS: 0
3. TROUBLE FALLING OR STAYING ASLEEP: 0
7. TROUBLE CONCENTRATING ON THINGS, SUCH AS READING THE NEWSPAPER OR WATCHING TELEVISION: 0
SUM OF ALL RESPONSES TO PHQ QUESTIONS 1-9: 0
9. THOUGHTS THAT YOU WOULD BE BETTER OFF DEAD, OR OF HURTING YOURSELF: 0
SUM OF ALL RESPONSES TO PHQ9 QUESTIONS 1 & 2: 0
SUM OF ALL RESPONSES TO PHQ QUESTIONS 1-9: 0
1. LITTLE INTEREST OR PLEASURE IN DOING THINGS: 0
4. FEELING TIRED OR HAVING LITTLE ENERGY: 0

## 2023-07-27 ASSESSMENT — LIFESTYLE VARIABLES
HOW OFTEN DO YOU HAVE A DRINK CONTAINING ALCOHOL: NEVER
HOW MANY STANDARD DRINKS CONTAINING ALCOHOL DO YOU HAVE ON A TYPICAL DAY: PATIENT DOES NOT DRINK

## 2023-07-27 NOTE — PROGRESS NOTES
Medicare Annual Wellness Visit    Nella Hawkins is here for Medicare AWV    Assessment & Plan   Medicare annual wellness visit, subsequent  Recommendations for Preventive Services Due: see orders and patient instructions/AVS.  Recommended screening schedule for the next 5-10 years is provided to the patient in written form: see Patient Instructions/AVS.     No follow-ups on file. Subjective       Patient's complete Health Risk Assessment and screening values have been reviewed and are found in Flowsheets. The following problems were reviewed today and where indicated follow up appointments were made and/or referrals ordered. Positive Risk Factor Screenings with Interventions:                 Weight and Activity:  Physical Activity: Sufficiently Active    Days of Exercise per Week: 3 days    Minutes of Exercise per Session: 60 min     On average, how many days per week do you engage in moderate to strenuous exercise (like a brisk walk)?: 3 days  Have you lost any weight without trying in the past 3 months?: No  There is no height or weight on file to calculate BMI. (!) Abnormal  Obesity Interventions:  Patient declines any further evaluation or treatment                               Objective      Patient-Reported Vitals  No data recorded     Unable to obtain 3 vital signs due to patient not having equipment to take blood pressure/temperature. Allergies   Allergen Reactions    Pollen Extract     Tree Extract     Vicodin [Hydrocodone-Acetaminophen] Nausea And Vomiting     Prior to Visit Medications    Medication Sig Taking? Authorizing Provider   PSYLLIUM PO Take 500 mg by mouth in the morning and at bedtime Patient reports taking 3 tablets with breakfast, 3 tablets with dinner.  Yes Historical Provider, MD   senna (SENOKOT) 8.6 MG tablet Take 2 tablets by mouth daily Yes Historical Provider, MD   atorvastatin (LIPITOR) 20 MG tablet TAKE 1 TABLET BY MOUTH ONE TIME A DAY Yes Michelle Montoya MD

## 2023-07-27 NOTE — PATIENT INSTRUCTIONS
Personalized Preventive Plan for Shaila Leblanc - 7/27/2023  Medicare offers a range of preventive health benefits. Some of the tests and screenings are paid in full while other may be subject to a deductible, co-insurance, and/or copay. Some of these benefits include a comprehensive review of your medical history including lifestyle, illnesses that may run in your family, and various assessments and screenings as appropriate. After reviewing your medical record and screening and assessments performed today your provider may have ordered immunizations, labs, imaging, and/or referrals for you. A list of these orders (if applicable) as well as your Preventive Care list are included within your After Visit Summary for your review. Other Preventive Recommendations:    A preventive eye exam performed by an eye specialist is recommended every 1-2 years to screen for glaucoma; cataracts, macular degeneration, and other eye disorders. A preventive dental visit is recommended every 6 months. Try to get at least 150 minutes of exercise per week or 10,000 steps per day on a pedometer . Order or download the FREE \"Exercise & Physical Activity: Your Everyday Guide\" from [employer]. Call 0-113.254.2721 or search [employer] online. You need 4283-1366 mg of calcium and 6146-9955 IU of vitamin D per day. It is possible to meet your calcium requirement with diet alone, but a vitamin D supplement is usually necessary to meet this goal.  When exposed to the sun, use a sunscreen that protects against both UVA and UVB radiation with an SPF of 30 or greater. Reapply every 2 to 3 hours or after sweating, drying off with a towel, or swimming. Always wear a seat belt when traveling in a car. Always wear a helmet when riding a bicycle or motorcycle.

## 2023-08-04 DIAGNOSIS — E11.9 TYPE 2 DIABETES MELLITUS WITHOUT COMPLICATION, WITHOUT LONG-TERM CURRENT USE OF INSULIN (HCC): ICD-10-CM

## 2023-08-04 RX ORDER — LISINOPRIL 5 MG/1
TABLET ORAL
Qty: 90 TABLET | Refills: 1 | Status: SHIPPED | OUTPATIENT
Start: 2023-08-04

## 2023-10-06 ENCOUNTER — OFFICE VISIT (OUTPATIENT)
Dept: FAMILY MEDICINE CLINIC | Age: 73
End: 2023-10-06
Payer: MEDICARE

## 2023-10-06 VITALS
RESPIRATION RATE: 16 BRPM | SYSTOLIC BLOOD PRESSURE: 128 MMHG | OXYGEN SATURATION: 96 % | HEART RATE: 86 BPM | HEIGHT: 71 IN | DIASTOLIC BLOOD PRESSURE: 78 MMHG | BODY MASS INDEX: 31.36 KG/M2 | WEIGHT: 224 LBS

## 2023-10-06 DIAGNOSIS — I10 HYPERTENSION, ESSENTIAL: ICD-10-CM

## 2023-10-06 DIAGNOSIS — F32.9 MAJOR DEPRESSIVE DISORDER, REMISSION STATUS UNSPECIFIED, UNSPECIFIED WHETHER RECURRENT: ICD-10-CM

## 2023-10-06 DIAGNOSIS — E11.9 TYPE 2 DIABETES MELLITUS WITHOUT COMPLICATION, WITHOUT LONG-TERM CURRENT USE OF INSULIN (HCC): ICD-10-CM

## 2023-10-06 DIAGNOSIS — E11.9 TYPE 2 DIABETES MELLITUS WITHOUT COMPLICATION, WITHOUT LONG-TERM CURRENT USE OF INSULIN (HCC): Primary | ICD-10-CM

## 2023-10-06 DIAGNOSIS — I71.40 ABDOMINAL AORTIC ANEURYSM (AAA) WITHOUT RUPTURE, UNSPECIFIED PART (HCC): ICD-10-CM

## 2023-10-06 DIAGNOSIS — E78.49 OTHER HYPERLIPIDEMIA: ICD-10-CM

## 2023-10-06 DIAGNOSIS — N40.0 BENIGN PROSTATIC HYPERPLASIA WITHOUT LOWER URINARY TRACT SYMPTOMS: ICD-10-CM

## 2023-10-06 LAB
ALBUMIN SERPL-MCNC: 4.9 G/DL (ref 3.4–5)
ALBUMIN/GLOB SERPL: 2.3 {RATIO} (ref 1.1–2.2)
ALP SERPL-CCNC: 65 U/L (ref 40–129)
ALT SERPL-CCNC: 12 U/L (ref 10–40)
ANION GAP SERPL CALCULATED.3IONS-SCNC: 15 MMOL/L (ref 3–16)
AST SERPL-CCNC: 11 U/L (ref 15–37)
BILIRUB SERPL-MCNC: 0.5 MG/DL (ref 0–1)
BUN SERPL-MCNC: 14 MG/DL (ref 7–20)
CALCIUM SERPL-MCNC: 9.6 MG/DL (ref 8.3–10.6)
CHLORIDE SERPL-SCNC: 105 MMOL/L (ref 99–110)
CHOLEST SERPL-MCNC: 150 MG/DL (ref 0–199)
CO2 SERPL-SCNC: 24 MMOL/L (ref 21–32)
CREAT SERPL-MCNC: 1 MG/DL (ref 0.8–1.3)
CREAT UR-MCNC: 115.9 MG/DL (ref 39–259)
DEPRECATED RDW RBC AUTO: 14.3 % (ref 12.4–15.4)
GFR SERPLBLD CREATININE-BSD FMLA CKD-EPI: >60 ML/MIN/{1.73_M2}
GLUCOSE SERPL-MCNC: 125 MG/DL (ref 70–99)
HCT VFR BLD AUTO: 47.8 % (ref 40.5–52.5)
HDLC SERPL-MCNC: 59 MG/DL (ref 40–60)
HGB BLD-MCNC: 16.1 G/DL (ref 13.5–17.5)
LDLC SERPL CALC-MCNC: 71 MG/DL
MCH RBC QN AUTO: 32.6 PG (ref 26–34)
MCHC RBC AUTO-ENTMCNC: 33.7 G/DL (ref 31–36)
MCV RBC AUTO: 96.7 FL (ref 80–100)
MICROALBUMIN UR DL<=1MG/L-MCNC: <1.2 MG/DL
MICROALBUMIN/CREAT UR: NORMAL MG/G (ref 0–30)
PLATELET # BLD AUTO: 266 K/UL (ref 135–450)
PMV BLD AUTO: 7.4 FL (ref 5–10.5)
POTASSIUM SERPL-SCNC: 5 MMOL/L (ref 3.5–5.1)
PROT SERPL-MCNC: 7 G/DL (ref 6.4–8.2)
RBC # BLD AUTO: 4.95 M/UL (ref 4.2–5.9)
SODIUM SERPL-SCNC: 144 MMOL/L (ref 136–145)
TRIGL SERPL-MCNC: 98 MG/DL (ref 0–150)
VLDLC SERPL CALC-MCNC: 20 MG/DL
WBC # BLD AUTO: 6.1 K/UL (ref 4–11)

## 2023-10-06 PROCEDURE — 3078F DIAST BP <80 MM HG: CPT | Performed by: FAMILY MEDICINE

## 2023-10-06 PROCEDURE — 3044F HG A1C LEVEL LT 7.0%: CPT | Performed by: FAMILY MEDICINE

## 2023-10-06 PROCEDURE — 1036F TOBACCO NON-USER: CPT | Performed by: FAMILY MEDICINE

## 2023-10-06 PROCEDURE — G0008 ADMIN INFLUENZA VIRUS VAC: HCPCS | Performed by: FAMILY MEDICINE

## 2023-10-06 PROCEDURE — 99214 OFFICE O/P EST MOD 30 MIN: CPT | Performed by: FAMILY MEDICINE

## 2023-10-06 PROCEDURE — 1123F ACP DISCUSS/DSCN MKR DOCD: CPT | Performed by: FAMILY MEDICINE

## 2023-10-06 PROCEDURE — 3074F SYST BP LT 130 MM HG: CPT | Performed by: FAMILY MEDICINE

## 2023-10-06 PROCEDURE — 3017F COLORECTAL CA SCREEN DOC REV: CPT | Performed by: FAMILY MEDICINE

## 2023-10-06 PROCEDURE — G8427 DOCREV CUR MEDS BY ELIG CLIN: HCPCS | Performed by: FAMILY MEDICINE

## 2023-10-06 PROCEDURE — G8484 FLU IMMUNIZE NO ADMIN: HCPCS | Performed by: FAMILY MEDICINE

## 2023-10-06 PROCEDURE — G8417 CALC BMI ABV UP PARAM F/U: HCPCS | Performed by: FAMILY MEDICINE

## 2023-10-06 PROCEDURE — 90694 VACC AIIV4 NO PRSRV 0.5ML IM: CPT | Performed by: FAMILY MEDICINE

## 2023-10-06 PROCEDURE — 2022F DILAT RTA XM EVC RTNOPTHY: CPT | Performed by: FAMILY MEDICINE

## 2023-10-06 ASSESSMENT — ENCOUNTER SYMPTOMS
VOMITING: 0
WHEEZING: 0
CHEST TIGHTNESS: 0
BLOOD IN STOOL: 0
SHORTNESS OF BREATH: 0
NAUSEA: 0
ABDOMINAL PAIN: 0
TROUBLE SWALLOWING: 0
DIARRHEA: 0
EYE PAIN: 0

## 2023-10-06 NOTE — PROGRESS NOTES
10/6/2023    Trevin Underwood    Chief Complaint   Patient presents with    6 Month Follow-Up    Flu Vaccine     Would like flu shot. OTHER     Would like to know when should do imaging for his Aortic A       HPI  History was obtained from patient. Mariposa Hameed is a 75 y.o. male who presents today with need for routine recheck. Patient has history of previously noted AAA last CT was 922 they recommended 2-year follow-up. Other issues include diabetes mellitus type 2, hypertension, BPH, depression, functional diarrhea, and hyperlipidemia. He does try to exercise could do better. Sugars have not been too bad last A1c was 6.2%. Urination has been reasonable mood has been stable. Diarrhea is actually been better. He is about to see podiatrist again for his annual foot exam.  At this point meds are well-tolerated on review he does need flu shot high-dose today in office and advised to get COVID and consider RSV per pharmacy. We will also check some lab work. Med list reviewed no refills needed at this point. REVIEW OF SYMPTOMS    Review of Systems   Constitutional:  Negative for activity change and fatigue. HENT:  Negative for congestion, hearing loss, mouth sores and trouble swallowing. Eyes:  Negative for pain and visual disturbance. Respiratory:  Negative for chest tightness, shortness of breath and wheezing. Cardiovascular:  Negative for chest pain and palpitations. Patient with known abdominal aortic aneurysm recheck in 1 year with CT scan. Gastrointestinal:  Negative for abdominal pain, blood in stool, diarrhea, nausea and vomiting. Chronic functional diarrhea improved   Endocrine: Negative for cold intolerance, polydipsia and polyuria. Patient on treatment for type 2 diabetes. Genitourinary:  Positive for difficulty urinating. Negative for dysuria, frequency and urgency. Musculoskeletal:  Negative for arthralgias, gait problem and neck stiffness.    Skin:  Negative for

## 2023-10-07 LAB
EST. AVERAGE GLUCOSE BLD GHB EST-MCNC: 128.4 MG/DL
HBA1C MFR BLD: 6.1 %

## 2023-11-27 DIAGNOSIS — E11.9 TYPE 2 DIABETES MELLITUS WITHOUT COMPLICATION, WITHOUT LONG-TERM CURRENT USE OF INSULIN (HCC): ICD-10-CM

## 2023-11-27 RX ORDER — GLIPIZIDE 2.5 MG/1
2.5 TABLET, EXTENDED RELEASE ORAL DAILY
Qty: 90 TABLET | Refills: 1 | Status: SHIPPED | OUTPATIENT
Start: 2023-11-27 | End: 2024-05-25

## 2023-12-26 DIAGNOSIS — I10 HYPERTENSION, ESSENTIAL: ICD-10-CM

## 2023-12-26 RX ORDER — DILTIAZEM HYDROCHLORIDE 300 MG/1
300 CAPSULE, COATED, EXTENDED RELEASE ORAL DAILY
Qty: 90 CAPSULE | Refills: 1 | Status: SHIPPED | OUTPATIENT
Start: 2023-12-26

## 2024-01-24 DIAGNOSIS — E78.49 OTHER HYPERLIPIDEMIA: ICD-10-CM

## 2024-01-24 RX ORDER — ATORVASTATIN CALCIUM 20 MG/1
TABLET, FILM COATED ORAL
Qty: 90 TABLET | Refills: 1 | Status: SHIPPED | OUTPATIENT
Start: 2024-01-24

## 2024-02-05 DIAGNOSIS — E11.9 TYPE 2 DIABETES MELLITUS WITHOUT COMPLICATION, WITHOUT LONG-TERM CURRENT USE OF INSULIN (HCC): ICD-10-CM

## 2024-02-29 DIAGNOSIS — E11.9 TYPE 2 DIABETES MELLITUS WITHOUT COMPLICATION, WITHOUT LONG-TERM CURRENT USE OF INSULIN (HCC): ICD-10-CM

## 2024-02-29 RX ORDER — PIOGLITAZONEHYDROCHLORIDE 30 MG/1
30 TABLET ORAL DAILY
Qty: 90 TABLET | Refills: 1 | Status: SHIPPED | OUTPATIENT
Start: 2024-02-29

## 2024-03-13 RX ORDER — LISINOPRIL 5 MG/1
TABLET ORAL
Qty: 90 TABLET | Refills: 1 | Status: SHIPPED | OUTPATIENT
Start: 2024-03-13

## 2024-03-30 ASSESSMENT — PATIENT HEALTH QUESTIONNAIRE - PHQ9
3. TROUBLE FALLING OR STAYING ASLEEP: NOT AT ALL
SUM OF ALL RESPONSES TO PHQ QUESTIONS 1-9: 1
SUM OF ALL RESPONSES TO PHQ QUESTIONS 1-9: 1
5. POOR APPETITE OR OVEREATING: NOT AT ALL
1. LITTLE INTEREST OR PLEASURE IN DOING THINGS: NOT AT ALL
9. THOUGHTS THAT YOU WOULD BE BETTER OFF DEAD, OR OF HURTING YOURSELF: NOT AT ALL
8. MOVING OR SPEAKING SO SLOWLY THAT OTHER PEOPLE COULD HAVE NOTICED. OR THE OPPOSITE, BEING SO FIGETY OR RESTLESS THAT YOU HAVE BEEN MOVING AROUND A LOT MORE THAN USUAL: NOT AT ALL
9. THOUGHTS THAT YOU WOULD BE BETTER OFF DEAD, OR OF HURTING YOURSELF: NOT AT ALL
5. POOR APPETITE OR OVEREATING: NOT AT ALL
8. MOVING OR SPEAKING SO SLOWLY THAT OTHER PEOPLE COULD HAVE NOTICED. OR THE OPPOSITE - BEING SO FIDGETY OR RESTLESS THAT YOU HAVE BEEN MOVING AROUND A LOT MORE THAN USUAL: NOT AT ALL
1. LITTLE INTEREST OR PLEASURE IN DOING THINGS: NOT AT ALL
7. TROUBLE CONCENTRATING ON THINGS, SUCH AS READING THE NEWSPAPER OR WATCHING TELEVISION: NOT AT ALL
10. IF YOU CHECKED OFF ANY PROBLEMS, HOW DIFFICULT HAVE THESE PROBLEMS MADE IT FOR YOU TO DO YOUR WORK, TAKE CARE OF THINGS AT HOME, OR GET ALONG WITH OTHER PEOPLE: NOT DIFFICULT AT ALL
SUM OF ALL RESPONSES TO PHQ QUESTIONS 1-9: 1
10. IF YOU CHECKED OFF ANY PROBLEMS, HOW DIFFICULT HAVE THESE PROBLEMS MADE IT FOR YOU TO DO YOUR WORK, TAKE CARE OF THINGS AT HOME, OR GET ALONG WITH OTHER PEOPLE: NOT DIFFICULT AT ALL
SUM OF ALL RESPONSES TO PHQ QUESTIONS 1-9: 1
2. FEELING DOWN, DEPRESSED OR HOPELESS: NOT AT ALL
7. TROUBLE CONCENTRATING ON THINGS, SUCH AS READING THE NEWSPAPER OR WATCHING TELEVISION: NOT AT ALL
2. FEELING DOWN, DEPRESSED OR HOPELESS: NOT AT ALL
SUM OF ALL RESPONSES TO PHQ9 QUESTIONS 1 & 2: 0
SUM OF ALL RESPONSES TO PHQ QUESTIONS 1-9: 1
6. FEELING BAD ABOUT YOURSELF - OR THAT YOU ARE A FAILURE OR HAVE LET YOURSELF OR YOUR FAMILY DOWN: NOT AT ALL
3. TROUBLE FALLING OR STAYING ASLEEP: NOT AT ALL
4. FEELING TIRED OR HAVING LITTLE ENERGY: SEVERAL DAYS
6. FEELING BAD ABOUT YOURSELF - OR THAT YOU ARE A FAILURE OR HAVE LET YOURSELF OR YOUR FAMILY DOWN: NOT AT ALL
4. FEELING TIRED OR HAVING LITTLE ENERGY: SEVERAL DAYS

## 2024-04-05 ENCOUNTER — OFFICE VISIT (OUTPATIENT)
Dept: FAMILY MEDICINE CLINIC | Age: 74
End: 2024-04-05
Payer: COMMERCIAL

## 2024-04-05 VITALS
HEIGHT: 71 IN | RESPIRATION RATE: 16 BRPM | BODY MASS INDEX: 31.68 KG/M2 | OXYGEN SATURATION: 96 % | SYSTOLIC BLOOD PRESSURE: 130 MMHG | WEIGHT: 226.3 LBS | DIASTOLIC BLOOD PRESSURE: 68 MMHG | HEART RATE: 91 BPM

## 2024-04-05 DIAGNOSIS — R19.7 DIARRHEA, UNSPECIFIED TYPE: ICD-10-CM

## 2024-04-05 DIAGNOSIS — I10 HYPERTENSION, ESSENTIAL: ICD-10-CM

## 2024-04-05 DIAGNOSIS — E78.49 OTHER HYPERLIPIDEMIA: ICD-10-CM

## 2024-04-05 DIAGNOSIS — N40.0 BENIGN PROSTATIC HYPERPLASIA WITHOUT LOWER URINARY TRACT SYMPTOMS: Chronic | ICD-10-CM

## 2024-04-05 DIAGNOSIS — E11.9 TYPE 2 DIABETES MELLITUS WITHOUT COMPLICATION, WITHOUT LONG-TERM CURRENT USE OF INSULIN (HCC): Primary | ICD-10-CM

## 2024-04-05 DIAGNOSIS — I71.40 ABDOMINAL AORTIC ANEURYSM (AAA) WITHOUT RUPTURE, UNSPECIFIED PART (HCC): ICD-10-CM

## 2024-04-05 DIAGNOSIS — K57.90 DIVERTICULAR DISEASE: ICD-10-CM

## 2024-04-05 DIAGNOSIS — E11.9 TYPE 2 DIABETES MELLITUS WITHOUT COMPLICATION, WITHOUT LONG-TERM CURRENT USE OF INSULIN (HCC): ICD-10-CM

## 2024-04-05 LAB
ALBUMIN SERPL-MCNC: 4.5 G/DL (ref 3.4–5)
ALBUMIN/GLOB SERPL: 2.1 {RATIO} (ref 1.1–2.2)
ALP SERPL-CCNC: 68 U/L (ref 40–129)
ALT SERPL-CCNC: 13 U/L (ref 10–40)
ANION GAP SERPL CALCULATED.3IONS-SCNC: 11 MMOL/L (ref 3–16)
AST SERPL-CCNC: 10 U/L (ref 15–37)
BILIRUB SERPL-MCNC: 0.4 MG/DL (ref 0–1)
BUN SERPL-MCNC: 16 MG/DL (ref 7–20)
CALCIUM SERPL-MCNC: 9.5 MG/DL (ref 8.3–10.6)
CHLORIDE SERPL-SCNC: 106 MMOL/L (ref 99–110)
CO2 SERPL-SCNC: 28 MMOL/L (ref 21–32)
CREAT SERPL-MCNC: 0.9 MG/DL (ref 0.8–1.3)
DEPRECATED RDW RBC AUTO: 14.6 % (ref 12.4–15.4)
GFR SERPLBLD CREATININE-BSD FMLA CKD-EPI: 89 ML/MIN/{1.73_M2}
GLUCOSE SERPL-MCNC: 143 MG/DL (ref 70–99)
HCT VFR BLD AUTO: 45.5 % (ref 40.5–52.5)
HGB BLD-MCNC: 15.4 G/DL (ref 13.5–17.5)
MCH RBC QN AUTO: 32.3 PG (ref 26–34)
MCHC RBC AUTO-ENTMCNC: 33.9 G/DL (ref 31–36)
MCV RBC AUTO: 95.3 FL (ref 80–100)
PLATELET # BLD AUTO: 261 K/UL (ref 135–450)
PMV BLD AUTO: 7.3 FL (ref 5–10.5)
POTASSIUM SERPL-SCNC: 4.5 MMOL/L (ref 3.5–5.1)
PROT SERPL-MCNC: 6.6 G/DL (ref 6.4–8.2)
RBC # BLD AUTO: 4.78 M/UL (ref 4.2–5.9)
SODIUM SERPL-SCNC: 145 MMOL/L (ref 136–145)
WBC # BLD AUTO: 5.9 K/UL (ref 4–11)

## 2024-04-05 PROCEDURE — G8427 DOCREV CUR MEDS BY ELIG CLIN: HCPCS | Performed by: FAMILY MEDICINE

## 2024-04-05 PROCEDURE — 3017F COLORECTAL CA SCREEN DOC REV: CPT | Performed by: FAMILY MEDICINE

## 2024-04-05 PROCEDURE — G8417 CALC BMI ABV UP PARAM F/U: HCPCS | Performed by: FAMILY MEDICINE

## 2024-04-05 PROCEDURE — 2022F DILAT RTA XM EVC RTNOPTHY: CPT | Performed by: FAMILY MEDICINE

## 2024-04-05 PROCEDURE — 1123F ACP DISCUSS/DSCN MKR DOCD: CPT | Performed by: FAMILY MEDICINE

## 2024-04-05 PROCEDURE — 3046F HEMOGLOBIN A1C LEVEL >9.0%: CPT | Performed by: FAMILY MEDICINE

## 2024-04-05 PROCEDURE — 99214 OFFICE O/P EST MOD 30 MIN: CPT | Performed by: FAMILY MEDICINE

## 2024-04-05 PROCEDURE — 1036F TOBACCO NON-USER: CPT | Performed by: FAMILY MEDICINE

## 2024-04-05 PROCEDURE — 3078F DIAST BP <80 MM HG: CPT | Performed by: FAMILY MEDICINE

## 2024-04-05 PROCEDURE — 3075F SYST BP GE 130 - 139MM HG: CPT | Performed by: FAMILY MEDICINE

## 2024-04-05 RX ORDER — DILTIAZEM HYDROCHLORIDE 300 MG/1
300 CAPSULE, COATED, EXTENDED RELEASE ORAL DAILY
Qty: 90 CAPSULE | Refills: 1 | Status: SHIPPED | OUTPATIENT
Start: 2024-04-05

## 2024-04-05 ASSESSMENT — ENCOUNTER SYMPTOMS
SHORTNESS OF BREATH: 0
VOMITING: 0
CHEST TIGHTNESS: 0
EYE PAIN: 0
BACK PAIN: 1
WHEEZING: 0
CONSTIPATION: 1
TROUBLE SWALLOWING: 0
NAUSEA: 0
DIARRHEA: 1
BLOOD IN STOOL: 0

## 2024-04-05 NOTE — PROGRESS NOTES
Mouth/Throat:      Mouth: Mucous membranes are moist.   Eyes:      Pupils: Pupils are equal, round, and reactive to light.   Cardiovascular:      Rate and Rhythm: Normal rate and regular rhythm.      Heart sounds: Normal heart sounds. No murmur heard.     No gallop.   Pulmonary:      Effort: Pulmonary effort is normal. No respiratory distress.      Breath sounds: Normal breath sounds. No wheezing.   Abdominal:      Palpations: Abdomen is soft.   Musculoskeletal:         General: No swelling or deformity. Normal range of motion.      Cervical back: Normal range of motion and neck supple. No rigidity.   Lymphadenopathy:      Cervical: No cervical adenopathy.   Skin:     General: Skin is warm and dry.      Coloration: Skin is not jaundiced.      Findings: No bruising.   Neurological:      General: No focal deficit present.      Mental Status: He is alert and oriented to person, place, and time. Mental status is at baseline.      Cranial Nerves: No cranial nerve deficit.      Motor: No weakness.   Psychiatric:         Mood and Affect: Mood normal.         Behavior: Behavior normal.         Thought Content: Thought content normal.         ASSESSMENT & PLAN     Diagnosis Orders   1. Type 2 diabetes mellitus without complication, without long-term current use of insulin ([facility])  Hemoglobin A1C      2. Hypertension, essential  dilTIAZem (CARDIZEM CD) 300 MG extended release capsule    Comprehensive Metabolic Panel    CBC      3. Other hyperlipidemia        4. Benign prostatic hyperplasia without lower urinary tract symptoms        5. Diverticular disease        6. Abdominal aortic aneurysm (AAA) without rupture, unspecified part ([facility])        7. Diarrhea-intractable        Will check A1c, CMP and CBC.  Advised to get RSV and COVID booster at pharmacy per current CDC recommendations.  He may decrease metformin by 1 to 2 tablets per day and monitor sugars.  He will call later this summer for repeat ultrasound to check on

## 2024-04-06 LAB
EST. AVERAGE GLUCOSE BLD GHB EST-MCNC: 134.1 MG/DL
HBA1C MFR BLD: 6.3 %

## 2024-04-09 ENCOUNTER — APPOINTMENT (OUTPATIENT)
Dept: CT IMAGING | Age: 74
End: 2024-04-09
Payer: COMMERCIAL

## 2024-04-09 ENCOUNTER — HOSPITAL ENCOUNTER (EMERGENCY)
Age: 74
Discharge: HOME | End: 2024-04-09
Payer: COMMERCIAL

## 2024-04-09 VITALS
RESPIRATION RATE: 18 BRPM | DIASTOLIC BLOOD PRESSURE: 83 MMHG | OXYGEN SATURATION: 97 % | SYSTOLIC BLOOD PRESSURE: 133 MMHG | TEMPERATURE: 98.4 F | HEART RATE: 92 BPM
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
## 2024-04-09 DIAGNOSIS — M54.50 LOW BACK PAIN, UNSPECIFIED BACK PAIN LATERALITY, UNSPECIFIED CHRONICITY, UNSPECIFIED WHETHER SCIATICA PRESENT: ICD-10-CM

## 2024-04-09 DIAGNOSIS — S09.90XA CLOSED HEAD INJURY, INITIAL ENCOUNTER: ICD-10-CM

## 2024-04-09 LAB
ANION GAP SERPL CALCULATED.3IONS-SCNC: 11 MMOL/L (ref 7–16)
BASOPHILS ABSOLUTE: 0 K/CU MM
BASOPHILS RELATIVE PERCENT: 0.6 % (ref 0–1)
BUN SERPL-MCNC: 12 MG/DL (ref 6–23)
CALCIUM SERPL-MCNC: 9.2 MG/DL (ref 8.3–10.6)
CHLORIDE BLD-SCNC: 103 MMOL/L (ref 99–110)
CO2: 26 MMOL/L (ref 21–32)
CREAT SERPL-MCNC: 0.8 MG/DL (ref 0.9–1.3)
DIFFERENTIAL TYPE: ABNORMAL
EOSINOPHILS ABSOLUTE: 0.2 K/CU MM
EOSINOPHILS RELATIVE PERCENT: 2.4 % (ref 0–3)
GFR SERPL CREATININE-BSD FRML MDRD: >90 ML/MIN/1.73M2
GLUCOSE SERPL-MCNC: 104 MG/DL (ref 70–99)
HCT VFR BLD CALC: 47.3 % (ref 42–52)
HEMOGLOBIN: 15.4 GM/DL (ref 13.5–18)
IMMATURE NEUTROPHIL %: 0.2 % (ref 0–0.43)
LYMPHOCYTES ABSOLUTE: 2.3 K/CU MM
LYMPHOCYTES RELATIVE PERCENT: 34.4 % (ref 24–44)
MCH RBC QN AUTO: 31.4 PG (ref 27–31)
MCHC RBC AUTO-ENTMCNC: 32.6 % (ref 32–36)
MCV RBC AUTO: 96.3 FL (ref 78–100)
MONOCYTES ABSOLUTE: 0.7 K/CU MM
MONOCYTES RELATIVE PERCENT: 11.1 % (ref 0–4)
NEUTROPHILS RELATIVE PERCENT: 51.3 % (ref 36–66)
NUCLEATED RBC %: 0 %
PDW BLD-RTO: 14.3 % (ref 11.7–14.9)
PLATELET # BLD: 254 K/CU MM (ref 140–440)
PMV BLD AUTO: 8.5 FL (ref 7.5–11.1)
POTASSIUM SERPL-SCNC: 4.1 MMOL/L (ref 3.5–5.1)
RBC # BLD: 4.91 M/CU MM (ref 4.6–6.2)
SEGMENTED NEUTROPHILS ABSOLUTE COUNT: 3.4 K/CU MM
SODIUM BLD-SCNC: 140 MMOL/L (ref 135–145)
TOTAL IMMATURE NEUTOROPHIL: 0.01 K/CU MM
TOTAL NUCLEATED RBC: 0 K/CU MM
WBC # BLD: 6.7 K/CU MM (ref 4–10.5)

## 2024-04-09 PROCEDURE — 80048 BASIC METABOLIC PNL TOTAL CA: CPT

## 2024-04-09 PROCEDURE — 76376 3D RENDER W/INTRP POSTPROCES: CPT

## 2024-04-09 PROCEDURE — 70450 CT HEAD/BRAIN W/O DYE: CPT

## 2024-04-09 PROCEDURE — 71275 CT ANGIOGRAPHY CHEST: CPT

## 2024-04-09 PROCEDURE — 6360000004 HC RX CONTRAST MEDICATION: Performed by: PHYSICIAN ASSISTANT

## 2024-04-09 PROCEDURE — 93005 ELECTROCARDIOGRAM TRACING: CPT | Performed by: EMERGENCY MEDICINE

## 2024-04-09 PROCEDURE — 85025 COMPLETE CBC W/AUTO DIFF WBC: CPT

## 2024-04-09 PROCEDURE — 99285 EMERGENCY DEPT VISIT HI MDM: CPT

## 2024-04-09 PROCEDURE — 6370000000 HC RX 637 (ALT 250 FOR IP): Performed by: PHYSICIAN ASSISTANT

## 2024-04-09 RX ORDER — HYDROCODONE BITARTRATE AND ACETAMINOPHEN 5; 325 MG/1; MG/1
1 TABLET ORAL EVERY 6 HOURS PRN
Qty: 10 TABLET | Refills: 0 | Status: SHIPPED | OUTPATIENT
Start: 2024-04-09 | End: 2024-04-12

## 2024-04-09 RX ORDER — HYDROCODONE BITARTRATE AND ACETAMINOPHEN 5; 325 MG/1; MG/1
1 TABLET ORAL ONCE
Status: COMPLETED | OUTPATIENT
Start: 2024-04-09 | End: 2024-04-09

## 2024-04-09 RX ADMIN — IOPAMIDOL 80 ML: 755 INJECTION, SOLUTION INTRAVENOUS at 20:15

## 2024-04-09 RX ADMIN — HYDROCODONE BITARTRATE AND ACETAMINOPHEN 1 TABLET: 5; 325 TABLET ORAL at 18:43

## 2024-04-09 ASSESSMENT — PAIN DESCRIPTION - ORIENTATION: ORIENTATION: RIGHT;LEFT;MID;LOWER

## 2024-04-09 ASSESSMENT — PAIN DESCRIPTION - LOCATION: LOCATION: BACK;HEAD

## 2024-04-09 ASSESSMENT — PAIN SCALES - GENERAL: PAINLEVEL_OUTOF10: 8

## 2024-04-09 ASSESSMENT — PAIN DESCRIPTION - DESCRIPTORS: DESCRIPTORS: NAGGING

## 2024-04-09 NOTE — ED NOTES
Pt reports that he was bent over and then fell backwards and hit his head. Pt states that he does not take blood thinners and he denies LOC but states he is short of breath and is worried about his AAA

## 2024-04-09 NOTE — ED NOTES
Wife approached pt and asked if he was still dizzy and pt stated no and he is having a hard time breathing but denies its from pain

## 2024-04-10 ENCOUNTER — CARE COORDINATION (OUTPATIENT)
Dept: CARE COORDINATION | Age: 74
End: 2024-04-10

## 2024-04-10 LAB
EKG ATRIAL RATE: 86 BPM
EKG DIAGNOSIS: NORMAL
EKG P AXIS: 27 DEGREES
EKG P-R INTERVAL: 148 MS
EKG Q-T INTERVAL: 354 MS
EKG QRS DURATION: 72 MS
EKG QTC CALCULATION (BAZETT): 423 MS
EKG R AXIS: -29 DEGREES
EKG T AXIS: -7 DEGREES
EKG VENTRICULAR RATE: 86 BPM

## 2024-04-10 PROCEDURE — 93010 ELECTROCARDIOGRAM REPORT: CPT | Performed by: INTERNAL MEDICINE

## 2024-04-10 NOTE — CARE COORDINATION
Ambulatory Care Coordination  ED Follow up Call    Reason for ED visit:  fall  Status:     improved    Did you call your PCP prior to going to the ED?  No      Did you receive a discharge instructions from the Emergency Room? Yes  Review of Instructions:     Understands what to report/when to return?:  Yes   Understands discharge instructions?:  Yes   Following discharge instructions?:  Yes   If not why?     Are there any new complaints of pain? No  New Pain Meds? Yes    Constipation prophylaxis needed?  No    If you have a wound is the dressing clean, dry, and intact? N/A  Understands wound care regimen? N/A    Are there any other complaints/concerns that you wish to tell your provider?   As noted    FU appts/Provider:    Future Appointments   Date Time Provider Department Center   8/22/2024  9:30 AM [facility] LPN [facility]   10/16/2024  9:00 AM Itz Amaro MD [facility]           New Medications?:   Yes      Medication Reconciliation by phone - No  Understands Medications?  Yes  Taking Medications? Yes  Can you swallow your pills?  Yes    Any further needs in the home i.e. Equipment?  No    Link to services in community?:  No   Which services:        Call to pt, pt getting med, spoke with wife, ok per HIPAA. Reports pt Feeling Better and was prescribed pain med. Slept great last night. Reports pt was sob after fall and wife concerned from what ct scan shows .. Reports has had some sinus congestion no coughing. Will forward to pcp.     IMPRESSION:     CHEST:     Patchy groundglass densities in the lungs bilaterally, suggesting an inflammatory or infectious process. Correlate clinically.     No other acute findings in the chest.  Will forward to pcp.

## 2024-04-10 NOTE — ED PROVIDER NOTES
EMERGENCY DEPARTMENT ENCOUNTER        Pt Name: Antonio Sandra  MRN: [id number]  Birthdate 1950  Date of evaluation: 4/9/2024  Provider: Kylie Waite PA-C  PCP: Itz Amaro MD    MICKEY. I have evaluated this patient.        Triage CHIEF COMPLAINT       Chief Complaint   Patient presents with    Fall         HISTORY OF PRESENT ILLNESS      Chief Complaint: Fall    Antonio Sandra is a 73 y.o. male who presents after falling at home.  Patient states he was squatted down to pick something up and fell backwards. Denies any dizziness or lightheadedness, he states he just fell without warning.  Denies any LOC.  He did hit his head on the carpet.  He is not on blood thinners.  He complains of pain to the back of the head where it struck the ground as well as low back pain. Denies any radiation down the legs, numbness, tingling, weakness.  Denies any neck pain, chest pain, abd pain.  He does feel somewhat SOB.  He has a history of AAA and reported concern about this to triage nurse.        Nursing Notes were all reviewed and agreed with or any disagreements were addressed in the HPI.    REVIEW OF SYSTEMS     CONSTITUTIONAL:  Denies fever.  EYES:  Denies visual changes.  HEAD:  + headache.  ENT:  Denies earache, nasal congestion, sore throat.  NECK:  Denies neck pain.  RESPIRATORY:  Denies any shortness of breath.  CARDIOVASCULAR:  Denies chest pain.  GI:  Denies nausea or vomiting.    :  Denies urinary symptoms.  MUSCULOSKELETAL:  Denies extremity pain or swelling.  BACK:  + back pain.  INTEGUMENT:  Denies skin changes.  LYMPHATIC:  Denies lymphadenopathy.  NEUROLOGIC:  Denies any numbness/tingling.  PSYCHIATRIC:  Denies SI/HI.    PAST MEDICAL HISTORY     Past Medical History:   Diagnosis Date    Benign prostatic hyperplasia     DVT (deep venous thrombosis) (HCC)     Hemochromatosis     History of pulmonary embolism     Hyperlipidemia     Hypertension, essential     Kidney stone     in er for kidney

## 2024-04-10 NOTE — TELEPHONE ENCOUNTER
Patient notified.  Would like ATB sent to [facility] pharmacy.  Hospital follow up appointment scheduled with Dr Rhodes tomorrow afternoon

## 2024-04-10 NOTE — TELEPHONE ENCOUNTER
Patient was [facility] emergency room yesterday with history of fall -workup was fairly negative other than some groundglass changes on CT of chest.  The lesions usually indicate either inflammation or infection.  I have not seen the patient of course.  Contact patient and/or family and if they are agreeable we can send in an antibiotic to help with the possibility of occult pulmonary infection.  Let me know if they are agreeable we will choose antibiotic and sent in

## 2024-04-10 NOTE — ED PROVIDER NOTES
I did not see this patient.  I reviewed the EKG obtained in this visit which shows normal sinus rhythm ventricular rate of 86 bpm without ST elevations or ST depressions.  No obvious Q waves noted.  QTc is 423 ms.  QRS duration 72 ms and IN interval is 140 ms     Beny Whittaker MD  04/09/24 1554

## 2024-04-11 ENCOUNTER — OFFICE VISIT (OUTPATIENT)
Dept: FAMILY MEDICINE CLINIC | Age: 74
End: 2024-04-11
Payer: COMMERCIAL

## 2024-04-11 VITALS
HEART RATE: 100 BPM | DIASTOLIC BLOOD PRESSURE: 80 MMHG | OXYGEN SATURATION: 96 % | SYSTOLIC BLOOD PRESSURE: 134 MMHG | BODY MASS INDEX: 31.92 KG/M2 | HEIGHT: 71 IN | WEIGHT: 228 LBS
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
## 2024-04-11 DIAGNOSIS — R53.82 CHRONIC FATIGUE: ICD-10-CM
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
## 2024-04-11 DIAGNOSIS — R06.09 DOE (DYSPNEA ON EXERTION): ICD-10-CM

## 2024-04-11 DIAGNOSIS — R94.31 LEFT AXIS DEVIATION: Primary | ICD-10-CM

## 2024-04-11 PROCEDURE — 1036F TOBACCO NON-USER: CPT | Performed by: INTERNAL MEDICINE

## 2024-04-11 PROCEDURE — G8417 CALC BMI ABV UP PARAM F/U: HCPCS | Performed by: INTERNAL MEDICINE

## 2024-04-11 PROCEDURE — 3017F COLORECTAL CA SCREEN DOC REV: CPT | Performed by: INTERNAL MEDICINE

## 2024-04-11 PROCEDURE — 3075F SYST BP GE 130 - 139MM HG: CPT | Performed by: INTERNAL MEDICINE

## 2024-04-11 PROCEDURE — 3079F DIAST BP 80-89 MM HG: CPT | Performed by: INTERNAL MEDICINE

## 2024-04-11 PROCEDURE — 99214 OFFICE O/P EST MOD 30 MIN: CPT | Performed by: INTERNAL MEDICINE

## 2024-04-11 PROCEDURE — 1123F ACP DISCUSS/DSCN MKR DOCD: CPT | Performed by: INTERNAL MEDICINE

## 2024-04-11 PROCEDURE — G8427 DOCREV CUR MEDS BY ELIG CLIN: HCPCS | Performed by: INTERNAL MEDICINE

## 2024-04-11 RX ORDER — PROMETHAZINE HYDROCHLORIDE 12.5 MG/1
12.5 TABLET ORAL 3 TIMES DAILY PRN
Qty: 12 TABLET | Refills: 0 | Status: SHIPPED | OUTPATIENT
Start: 2024-04-11 | End: 2024-04-18

## 2024-04-11 RX ORDER — LEVOFLOXACIN 500 MG/1
500 TABLET, FILM COATED ORAL DAILY
Qty: 7 TABLET | Refills: 0 | Status: SHIPPED | OUTPATIENT
Start: 2024-04-11 | End: 2024-04-11 | Stop reason: ALTCHOICE

## 2024-04-11 SDOH — ECONOMIC STABILITY: INCOME INSECURITY: HOW HARD IS IT FOR YOU TO PAY FOR THE VERY BASICS LIKE FOOD, HOUSING, MEDICAL CARE, AND HEATING?: NOT HARD AT ALL

## 2024-04-11 SDOH — ECONOMIC STABILITY: FOOD INSECURITY: WITHIN THE PAST 12 MONTHS, THE FOOD YOU BOUGHT JUST DIDN'T LAST AND YOU DIDN'T HAVE MONEY TO GET MORE.: NEVER TRUE

## 2024-04-11 SDOH — ECONOMIC STABILITY: FOOD INSECURITY: WITHIN THE PAST 12 MONTHS, YOU WORRIED THAT YOUR FOOD WOULD RUN OUT BEFORE YOU GOT MONEY TO BUY MORE.: NEVER TRUE

## 2024-04-11 NOTE — PROGRESS NOTES
Outpatient Clinic Visit Note    Patient: Antonio Sandra  : 1950 (73 y.o.)  Date: 4/15/2024    CC:  Chief Complaint   Patient presents with    Follow-up     ER follow up.  Patient states he is still having pain in his back and in the back of his head (since the fall).  Patient states he has had nausea and dizziness since the fall 2024.  Patient states he has been short of breath since the fall        HPI: as above.  No nausea or vomiting.  No chronic cough or pleuritic chest pain.     Past Medical History:    Past Medical History:   Diagnosis Date    Benign prostatic hyperplasia     DVT (deep venous thrombosis) (HCC)     Hemochromatosis     History of pulmonary embolism     Hyperlipidemia     Hypertension, essential     Kidney stone     in er for kidney stones(13), had Leni, saw Dr Reddy, did pass one\"    Major depressive disorder     Mantoux: positive     Polycythemia vera(238.4) dx late     \"per wife-(13) \"according to Dr Amin he does not have this but he does have high iron\"    Pulmonary embolism (HCC)     old chart gives hx brant PE with right lower DVT 2012(pc)(had scope left knee 2012)- had Filter placement done 2012    Tinnitus     Type 2 diabetes mellitus (HCC)     dx        Past Surgical History:  Past Surgical History:   Procedure Laterality Date    COLONOSCOPY      KNEE ARTHROSCOPY Right 2012    LITHOTRIPSY      SKIN BIOPSY  2012    Spermatocelectomy    VASECTOMY  1980    VENA CAVA FILTER PLACEMENT         Home Medications:  Current Outpatient Medications   Medication Sig Dispense Refill    promethazine (PHENERGAN) 12.5 MG tablet Take 1 tablet by mouth 3 times daily as needed for Nausea 12 tablet 0    dilTIAZem (CARDIZEM CD) 300 MG extended release capsule Take 1 capsule by mouth daily 90 capsule 1    metFORMIN (GLUCOPHAGE) 500 MG tablet TAKE 2 TABS BY MOUTH IN THE MORNING, 1 TAB AT NOON, AND 2 TABS IN THE EVENING WITH MEALS 450 tablet 0    lisinopril

## 2024-04-18 ENCOUNTER — TELEPHONE (OUTPATIENT)
Dept: CARDIOLOGY CLINIC | Age: 74
End: 2024-04-18

## 2024-04-18 ENCOUNTER — OFFICE VISIT (OUTPATIENT)
Dept: CARDIOLOGY CLINIC | Age: 74
End: 2024-04-18
Payer: COMMERCIAL

## 2024-04-18 ENCOUNTER — OFFICE VISIT (OUTPATIENT)
Dept: PULMONOLOGY | Age: 74
End: 2024-04-18
Payer: COMMERCIAL

## 2024-04-18 VITALS
WEIGHT: 227.8 LBS | OXYGEN SATURATION: 95 % | HEIGHT: 71 IN | SYSTOLIC BLOOD PRESSURE: 118 MMHG | DIASTOLIC BLOOD PRESSURE: 58 MMHG | HEART RATE: 102 BPM | BODY MASS INDEX: 31.89 KG/M2

## 2024-04-18 VITALS
DIASTOLIC BLOOD PRESSURE: 74 MMHG | SYSTOLIC BLOOD PRESSURE: 126 MMHG | HEIGHT: 72 IN | OXYGEN SATURATION: 94 % | RESPIRATION RATE: 16 BRPM | WEIGHT: 226.4 LBS | BODY MASS INDEX: 30.66 KG/M2 | HEART RATE: 90 BPM

## 2024-04-18 DIAGNOSIS — R55 PRE-SYNCOPE: Primary | ICD-10-CM

## 2024-04-18 DIAGNOSIS — E78.49 OTHER HYPERLIPIDEMIA: ICD-10-CM

## 2024-04-18 DIAGNOSIS — R94.31 ABNORMAL EKG: ICD-10-CM

## 2024-04-18 DIAGNOSIS — I10 HYPERTENSION, ESSENTIAL: ICD-10-CM

## 2024-04-18 DIAGNOSIS — R53.82 CHRONIC FATIGUE: ICD-10-CM

## 2024-04-18 DIAGNOSIS — I71.40 ABDOMINAL AORTIC ANEURYSM (AAA) WITHOUT RUPTURE, UNSPECIFIED PART (HCC): ICD-10-CM

## 2024-04-18 DIAGNOSIS — E11.9 TYPE 2 DIABETES MELLITUS WITHOUT COMPLICATION, WITHOUT LONG-TERM CURRENT USE OF INSULIN (HCC): ICD-10-CM

## 2024-04-18 DIAGNOSIS — R00.0 TACHYCARDIA: ICD-10-CM

## 2024-04-18 DIAGNOSIS — J98.11 ATELECTASIS: Primary | ICD-10-CM

## 2024-04-18 DIAGNOSIS — R91.8 GROUND GLASS OPACITY PRESENT ON IMAGING OF LUNG: ICD-10-CM

## 2024-04-18 DIAGNOSIS — Z86.711 HISTORY OF PULMONARY EMBOLISM: ICD-10-CM

## 2024-04-18 PROCEDURE — G8417 CALC BMI ABV UP PARAM F/U: HCPCS | Performed by: NURSE PRACTITIONER

## 2024-04-18 PROCEDURE — G8427 DOCREV CUR MEDS BY ELIG CLIN: HCPCS | Performed by: INTERNAL MEDICINE

## 2024-04-18 PROCEDURE — 1123F ACP DISCUSS/DSCN MKR DOCD: CPT | Performed by: NURSE PRACTITIONER

## 2024-04-18 PROCEDURE — 3078F DIAST BP <80 MM HG: CPT | Performed by: NURSE PRACTITIONER

## 2024-04-18 PROCEDURE — 1036F TOBACCO NON-USER: CPT | Performed by: INTERNAL MEDICINE

## 2024-04-18 PROCEDURE — 99204 OFFICE O/P NEW MOD 45 MIN: CPT | Performed by: NURSE PRACTITIONER

## 2024-04-18 PROCEDURE — 1036F TOBACCO NON-USER: CPT | Performed by: NURSE PRACTITIONER

## 2024-04-18 PROCEDURE — 3017F COLORECTAL CA SCREEN DOC REV: CPT | Performed by: INTERNAL MEDICINE

## 2024-04-18 PROCEDURE — 2022F DILAT RTA XM EVC RTNOPTHY: CPT | Performed by: INTERNAL MEDICINE

## 2024-04-18 PROCEDURE — 3044F HG A1C LEVEL LT 7.0%: CPT | Performed by: INTERNAL MEDICINE

## 2024-04-18 PROCEDURE — G8427 DOCREV CUR MEDS BY ELIG CLIN: HCPCS | Performed by: NURSE PRACTITIONER

## 2024-04-18 PROCEDURE — 3074F SYST BP LT 130 MM HG: CPT | Performed by: NURSE PRACTITIONER

## 2024-04-18 PROCEDURE — 3017F COLORECTAL CA SCREEN DOC REV: CPT | Performed by: NURSE PRACTITIONER

## 2024-04-18 PROCEDURE — G8417 CALC BMI ABV UP PARAM F/U: HCPCS | Performed by: INTERNAL MEDICINE

## 2024-04-18 PROCEDURE — 3078F DIAST BP <80 MM HG: CPT | Performed by: INTERNAL MEDICINE

## 2024-04-18 PROCEDURE — 1123F ACP DISCUSS/DSCN MKR DOCD: CPT | Performed by: INTERNAL MEDICINE

## 2024-04-18 PROCEDURE — 3074F SYST BP LT 130 MM HG: CPT | Performed by: INTERNAL MEDICINE

## 2024-04-18 PROCEDURE — 99204 OFFICE O/P NEW MOD 45 MIN: CPT | Performed by: INTERNAL MEDICINE

## 2024-04-18 ASSESSMENT — ENCOUNTER SYMPTOMS: SHORTNESS OF BREATH: 1

## 2024-04-18 NOTE — ASSESSMENT & PLAN NOTE
Given risk factors and age will get a stress test ideally a Cardiolite also get echo to rule out structural heart disease   Patient updated on sleep study order. Patient verbalized understanding of information given. [facility] given for [facility] to [facility] if he doesn't hear from them.

## 2024-04-18 NOTE — PROGRESS NOTES
Antonio Sandra (:  1950) is a 73 y.o. male,New patient, here for evaluation of the following chief complaint(s):  CT (Pt states he took a fall Tuesday of last week and went to [facility], where he got a Ct scan and was referred to come here. ) and Shortness of Breath    Subjective   SUBJECTIVE/OBJECTIVE:  Antonio Sandra 72 yo male was referred after having a fall last week and having testing in ED. A CTA scan showed irregulatiry in the lungs. His  PCP revewed the scan and referred to pulmonary clinic for further evaluation. He has a medical history of abdominal aortic aneurysm. EKG obtained showed NSR, no ST elevations or ST depressions.     He endorses being a never smoker. He reports having seasonal allergies but has not been diagnosed with asthma. He is not using any bronchodilators of any type and has not needed to use them. He denies flu or pneumonia. He was infected with Covid-19 two years ago in  Oct 22. He was treated with Paxviod. He rested at home. He reports having congestion, difficult breathing  and flu like symptoms of body aches, but no fever, no vomiting, nausea, or coughing up blood. He does report having sinus congestion occasionally.     SatO2 is 95% on room air. He denies having difficulty breathing while walking and performing ADLs. He has had no exposure to chemicals at work.  He retired from the [employer]. HR is slightly elevated at 102 bpm. BP is 118/58. He has an appointment to follow up with cardiology.     Shortness of Breath      Review of Systems   Respiratory:  Positive for shortness of breath.         Objective   Physical Exam  Vitals and nursing note reviewed. Exam conducted with a chaperone present (wife, Aida Martínez).   Constitutional:       General: He is awake.      Appearance: Normal appearance. He is well-developed and well-groomed.   HENT:      Mouth/Throat:      Mouth: Mucous membranes are moist.      Pharynx: Oropharynx is clear.   Eyes:      Extraocular Movements:

## 2024-04-18 NOTE — PROGRESS NOTES
CARDIOLOGY  NOTE    Chief Complaint: abnormal EKG    HPI:   Antonio is a 73 y.o. year old who has Past medical history as noted below.  Comes in for evaluation after an episode of losing balance as he was leaning over he went to the emergency department workup was negative and but EKG showed anterior fascicular block which seems to be an old finding he does have history of ascending aortic aneurysm as well as abdominal ectatic aorta  Abdominal CT scan shows aorta 2.5 x 2.5 cm in axial dimensions. It is ectatic in its course 3.9 x 3.9 cm mild prominence of the ascending thoracic aorta.   He has history of DVTs and PE back in 2012 after surgeries  He is diabetic but denies any chest pain reports some shortness of breath with exertion  He tells me has been tachycardic all his life therefore he has been on Cardizem    Current Outpatient Medications   Medication Sig Dispense Refill    promethazine (PHENERGAN) 12.5 MG tablet Take 1 tablet by mouth 3 times daily as needed for Nausea 12 tablet 0    dilTIAZem (CARDIZEM CD) 300 MG extended release capsule Take 1 capsule by mouth daily 90 capsule 1    metFORMIN (GLUCOPHAGE) 500 MG tablet TAKE 2 TABS BY MOUTH IN THE MORNING, 1 TAB AT NOON, AND 2 TABS IN THE EVENING WITH MEALS 450 tablet 0    lisinopril (PRINIVIL;ZESTRIL) 5 MG tablet TAKE 1 TABLET BY MOUTH EVERY DAY 90 tablet 1    pioglitazone (ACTOS) 30 MG tablet Take 1 tablet by mouth daily 90 tablet 1    dapagliflozin (FARXIGA) 10 MG tablet Take 1 tablet by mouth every morning 90 tablet 1    atorvastatin (LIPITOR) 20 MG tablet TAKE 1 TABLET BY MOUTH ONE TIME A DAY 90 tablet 1    glipiZIDE (GLUCOTROL XL) 2.5 MG extended release tablet Take 1 tablet by mouth daily 90 tablet 1    PSYLLIUM PO Take 500 mg by mouth in the morning and at bedtime      senna (SENOKOT) 8.6 MG tablet Take 1 tablet by mouth daily      Multiple Vitamins-Minerals (THERAPEUTIC MULTIVITAMIN-MINERALS) tablet Take 1

## 2024-04-30 ENCOUNTER — TELEPHONE (OUTPATIENT)
Dept: CARDIOLOGY CLINIC | Age: 74
End: 2024-04-30

## 2024-04-30 NOTE — TELEPHONE ENCOUNTER
Left vm with Holter results and advised patient to return call if any questions.  Monitor worn from 18th April to 20 April 2024 average heart rate was 79 bpm lowest heart of 64 highest heart was 170 short run of SVT noted for 3-4 beats at 7:31 AM at heart rate of 170.  Supraventricular ectopy burden was 0.1% PVC burden was less than .01%.  No significant sustained arrhythmias recorded.

## 2024-05-01 ENCOUNTER — OFFICE VISIT (OUTPATIENT)
Dept: FAMILY MEDICINE CLINIC | Age: 74
End: 2024-05-01
Payer: COMMERCIAL

## 2024-05-01 VITALS
SYSTOLIC BLOOD PRESSURE: 120 MMHG | HEIGHT: 72 IN | HEART RATE: 92 BPM | BODY MASS INDEX: 30.76 KG/M2 | RESPIRATION RATE: 18 BRPM | DIASTOLIC BLOOD PRESSURE: 70 MMHG | WEIGHT: 227.1 LBS | OXYGEN SATURATION: 97 %

## 2024-05-01 DIAGNOSIS — R06.00 DYSPNEA, UNSPECIFIED TYPE: ICD-10-CM

## 2024-05-01 DIAGNOSIS — K59.1 FUNCTIONAL DIARRHEA: ICD-10-CM

## 2024-05-01 DIAGNOSIS — R53.83 OTHER FATIGUE: ICD-10-CM
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
## 2024-05-01 DIAGNOSIS — E11.9 TYPE 2 DIABETES MELLITUS WITHOUT COMPLICATION, WITHOUT LONG-TERM CURRENT USE OF INSULIN (HCC): ICD-10-CM

## 2024-05-01 DIAGNOSIS — R55 NEAR SYNCOPE: ICD-10-CM

## 2024-05-01 PROCEDURE — 3017F COLORECTAL CA SCREEN DOC REV: CPT | Performed by: FAMILY MEDICINE

## 2024-05-01 PROCEDURE — 3044F HG A1C LEVEL LT 7.0%: CPT | Performed by: FAMILY MEDICINE

## 2024-05-01 PROCEDURE — G8427 DOCREV CUR MEDS BY ELIG CLIN: HCPCS | Performed by: FAMILY MEDICINE

## 2024-05-01 PROCEDURE — 3074F SYST BP LT 130 MM HG: CPT | Performed by: FAMILY MEDICINE

## 2024-05-01 PROCEDURE — 2022F DILAT RTA XM EVC RTNOPTHY: CPT | Performed by: FAMILY MEDICINE

## 2024-05-01 PROCEDURE — 99214 OFFICE O/P EST MOD 30 MIN: CPT | Performed by: FAMILY MEDICINE

## 2024-05-01 PROCEDURE — 1123F ACP DISCUSS/DSCN MKR DOCD: CPT | Performed by: FAMILY MEDICINE

## 2024-05-01 PROCEDURE — G8417 CALC BMI ABV UP PARAM F/U: HCPCS | Performed by: FAMILY MEDICINE

## 2024-05-01 PROCEDURE — 3078F DIAST BP <80 MM HG: CPT | Performed by: FAMILY MEDICINE

## 2024-05-01 PROCEDURE — 1036F TOBACCO NON-USER: CPT | Performed by: FAMILY MEDICINE

## 2024-05-01 RX ORDER — LEVOFLOXACIN 500 MG/1
TABLET, FILM COATED ORAL
COMMUNITY
Start: 2024-04-11 | End: 2024-05-01

## 2024-05-01 ASSESSMENT — ENCOUNTER SYMPTOMS
TROUBLE SWALLOWING: 0
VOMITING: 0
NAUSEA: 0
ABDOMINAL PAIN: 0
WHEEZING: 0
BLOOD IN STOOL: 0
SHORTNESS OF BREATH: 0
CHEST TIGHTNESS: 0
DIARRHEA: 1
EYE PAIN: 0

## 2024-05-01 NOTE — PROGRESS NOTES
5/1/2024    Antonio Sandra    Chief Complaint   Patient presents with    3 week     F/u on fall. Saw cardio and pulmonary. Tests are pending        HPI  History was obtained from the patient and his wife as well as review of records.  Antonio is a 73 y.o. male who presents today with history of episode of fall seen in emergency room subsequently has had follow-up with pulmonary medicine and cardiology.  PFTs and Cardiolite testing are still pending.  Just had cardiac echo with results not available did have Holter monitor that showed no significant arrhythmias.  Fortunately his diarrhea is for some reason improved.  He has had no further episodes of dizziness or fall or loss of consciousness.  Sugars have not been bad.  Discussion carried out we did go over some these results.  At this point increase to keep appointments with specialist as directed.  Monitor sugars do not skip meals and if diarrhea persists and having trouble getting into the new endocrinologist of choice let us know we will work on it again..    REVIEW OF SYMPTOMS    Review of Systems   Constitutional:  Negative for activity change and fatigue.        History of fall and presyncope.  See recent ER visit note   HENT:  Negative for congestion, hearing loss, mouth sores and trouble swallowing.    Eyes:  Negative for pain and visual disturbance.   Respiratory:  Negative for chest tightness, shortness of breath and wheezing.    Cardiovascular:  Negative for chest pain and palpitations.   Gastrointestinal:  Positive for diarrhea. Negative for abdominal pain, blood in stool, nausea and vomiting.   Genitourinary:  Negative for dysuria, frequency and urgency.   Musculoskeletal:  Negative for arthralgias, gait problem and neck stiffness.   Skin:  Negative for rash.   Allergic/Immunologic: Negative for environmental allergies.   Neurological:  Negative for dizziness, seizures, speech difficulty and weakness.   Hematological:  Does not bruise/bleed easily.

## 2024-05-06 ENCOUNTER — TELEPHONE (OUTPATIENT)
Dept: CARDIOLOGY CLINIC | Age: 74
End: 2024-05-06

## 2024-05-06 NOTE — TELEPHONE ENCOUNTER
Called patient left a message with the results of recent testing.   Echo -    Left Ventricle: Normal left ventricular systolic function with a visually estimated EF of 55 - 60%. Left ventricle size is normal. Mildly increased wall thickness. Normal wall motion. Diastolic filling pattern is normal for age.    Aorta: Normal sized aortic root. Dilated ascending aorta. Ao ascending diameter is 4.0 cm. The Abdominal Aorta was not visualized at this time.    Aortic Valve: Mild sclerosis of the aortic valve, noncoronary cusp.    Tricuspid Valve: Normal RVSP. The estimated RVSP is 30 mmHg.    Left Atrium: Left atrium is mildly dilated.    Mitral Valve: Mild regurgitation.    Pericardium: No pericardial effusion.    Image quality is fair.

## 2024-05-09 ENCOUNTER — TELEPHONE (OUTPATIENT)
Dept: CARDIOLOGY CLINIC | Age: 74
End: 2024-05-09

## 2024-05-09 NOTE — TELEPHONE ENCOUNTER
Normal stress test : LV perfusion is normal. There is no evidence of inducible ischemia.    The stress ECG was negative for ischemia.    Stress Test: A Misha protocol stress test was performed. Overall, the patient's exercise capacity was average for their age. The patient reached stage 2 of the protocol and was stressed for 4 min and 28 sec. Hemodynamics are adequate for diagnosis. Blood pressure demonstrated a normal response and heart rate demonstrated a normal response to stress. The patient's heart rate recovery was normal.    Resting ejection fraction was 72%. EDV was 73 ml ESV was 21 ml   spoke with pt regarding NM results , pt voiced understanding.

## 2024-05-14 ENCOUNTER — HOSPITAL ENCOUNTER (OUTPATIENT)
Dept: CARDIAC REHAB | Age: 74
Discharge: HOME | End: 2024-05-14
Payer: COMMERCIAL

## 2024-05-14 DIAGNOSIS — R91.8 GROUND GLASS OPACITY PRESENT ON IMAGING OF LUNG: ICD-10-CM

## 2024-05-14 DIAGNOSIS — J98.11 ATELECTASIS: ICD-10-CM

## 2024-05-14 PROCEDURE — 94727 GAS DIL/WSHOT DETER LNG VOL: CPT

## 2024-05-14 PROCEDURE — 94729 DIFFUSING CAPACITY: CPT

## 2024-05-14 PROCEDURE — 94640 AIRWAY INHALATION TREATMENT: CPT

## 2024-05-14 PROCEDURE — 94060 EVALUATION OF WHEEZING: CPT

## 2024-05-21 ENCOUNTER — OFFICE VISIT (OUTPATIENT)
Dept: CARDIOLOGY CLINIC | Age: 74
End: 2024-05-21
Payer: COMMERCIAL

## 2024-05-21 VITALS
HEART RATE: 87 BPM | BODY MASS INDEX: 30.77 KG/M2 | HEIGHT: 72 IN | OXYGEN SATURATION: 94 % | SYSTOLIC BLOOD PRESSURE: 114 MMHG | DIASTOLIC BLOOD PRESSURE: 60 MMHG | WEIGHT: 227.2 LBS

## 2024-05-21 DIAGNOSIS — R00.0 TACHYCARDIA: ICD-10-CM

## 2024-05-21 DIAGNOSIS — I71.40 ABDOMINAL AORTIC ANEURYSM (AAA) WITHOUT RUPTURE, UNSPECIFIED PART (HCC): ICD-10-CM

## 2024-05-21 DIAGNOSIS — I10 HYPERTENSION, ESSENTIAL: ICD-10-CM

## 2024-05-21 DIAGNOSIS — E78.49 OTHER HYPERLIPIDEMIA: ICD-10-CM

## 2024-05-21 DIAGNOSIS — E11.69 TYPE 2 DIABETES MELLITUS WITH OTHER SPECIFIED COMPLICATION, WITHOUT LONG-TERM CURRENT USE OF INSULIN (HCC): ICD-10-CM

## 2024-05-21 DIAGNOSIS — I71.21 ANEURYSM OF ASCENDING AORTA WITHOUT RUPTURE (HCC): ICD-10-CM

## 2024-05-21 DIAGNOSIS — Z86.711 HISTORY OF PULMONARY EMBOLISM: ICD-10-CM

## 2024-05-21 DIAGNOSIS — I71.43 INFRARENAL ABDOMINAL AORTIC ANEURYSM (AAA) WITHOUT RUPTURE (HCC): Primary | ICD-10-CM

## 2024-05-21 PROCEDURE — 1123F ACP DISCUSS/DSCN MKR DOCD: CPT | Performed by: NURSE PRACTITIONER

## 2024-05-21 PROCEDURE — G8417 CALC BMI ABV UP PARAM F/U: HCPCS | Performed by: NURSE PRACTITIONER

## 2024-05-21 PROCEDURE — 2022F DILAT RTA XM EVC RTNOPTHY: CPT | Performed by: NURSE PRACTITIONER

## 2024-05-21 PROCEDURE — 3078F DIAST BP <80 MM HG: CPT | Performed by: NURSE PRACTITIONER

## 2024-05-21 PROCEDURE — G8427 DOCREV CUR MEDS BY ELIG CLIN: HCPCS | Performed by: NURSE PRACTITIONER

## 2024-05-21 PROCEDURE — 3044F HG A1C LEVEL LT 7.0%: CPT | Performed by: NURSE PRACTITIONER

## 2024-05-21 PROCEDURE — 3017F COLORECTAL CA SCREEN DOC REV: CPT | Performed by: NURSE PRACTITIONER

## 2024-05-21 PROCEDURE — 1036F TOBACCO NON-USER: CPT | Performed by: NURSE PRACTITIONER

## 2024-05-21 PROCEDURE — 3074F SYST BP LT 130 MM HG: CPT | Performed by: NURSE PRACTITIONER

## 2024-05-21 PROCEDURE — 99214 OFFICE O/P EST MOD 30 MIN: CPT | Performed by: NURSE PRACTITIONER

## 2024-05-21 ASSESSMENT — ENCOUNTER SYMPTOMS
COUGH: 0
SHORTNESS OF BREATH: 0

## 2024-05-21 NOTE — PROGRESS NOTES
pioglitazone (ACTOS) 30 MG tablet Take 1 tablet by mouth daily 2/29/24  Yes Monica Neal APRN - CNP   dapagliflozin (FARXIGA) 10 MG tablet Take 1 tablet by mouth every morning 2/6/24  Yes Itz Amaro MD   atorvastatin (LIPITOR) 20 MG tablet TAKE 1 TABLET BY MOUTH ONE TIME A DAY 1/24/24  Yes Itz Amaro MD   glipiZIDE (GLUCOTROL XL) 2.5 MG extended release tablet Take 1 tablet by mouth daily 11/27/23 5/25/24 Yes Itz Amaro MD   PSYLLIUM PO Take 500 mg by mouth in the morning and at bedtime   Yes Anila Brar MD   senna (SENOKOT) 8.6 MG tablet Take 1 tablet by mouth daily   Yes Anila Brar MD   Multiple Vitamins-Minerals (THERAPEUTIC MULTIVITAMIN-MINERALS) tablet Take 1 tablet by mouth daily   Yes Anila Brar MD   COMBIGAN 0.2-0.5 % ophthalmic solution  12/1/20  Yes Anila Brar MD   LATANOPROST OP Apply to eye   Yes Anila Brar MD   Probiotic Product (PROBIOTIC DAILY PO) Take by mouth   Yes Anila Brar MD   niacinamide 500 MG tablet Take 1 tablet by mouth 2 times daily (with meals)   Yes Anila Brar MD   PANTOTHENIC ACID PO Take 500 mg by mouth daily.   Yes Anila Brar MD   Multiple Vitamins-Minerals (OCUVITE) TABS oral tablet Take 1 tablet by mouth daily   Yes Anila Brar MD   vitamin D3 (CHOLECALCIFEROL) 400 units TABS tablet Take by mouth daily Patient states taking 1000 units daily.   Yes Anila Brar MD       Physical Exam  Vitals reviewed.   Constitutional:       General: He is not in acute distress.     Appearance: Normal appearance. He is not ill-appearing.   HENT:      Head: Atraumatic.   Neck:      Vascular: No carotid bruit.   Cardiovascular:      Rate and Rhythm: Normal rate and regular rhythm.      Pulses: Normal pulses.      Heart sounds: Normal heart sounds. No murmur heard.  Pulmonary:      Effort: Pulmonary effort is normal. No respiratory distress.      Breath

## 2024-05-22 ENCOUNTER — TELEPHONE (OUTPATIENT)
Dept: CARDIOLOGY CLINIC | Age: 74
End: 2024-05-22

## 2024-05-22 NOTE — TELEPHONE ENCOUNTER
Test Ordered:  Echo    /  Insurance: [facility] Medicare  /  Authorization Status: No Auth Required

## 2024-05-28 DIAGNOSIS — E11.9 TYPE 2 DIABETES MELLITUS WITHOUT COMPLICATION, WITHOUT LONG-TERM CURRENT USE OF INSULIN (HCC): ICD-10-CM

## 2024-05-28 RX ORDER — GLIPIZIDE 2.5 MG/1
2.5 TABLET, EXTENDED RELEASE ORAL DAILY
Qty: 90 TABLET | Refills: 1 | Status: SHIPPED | OUTPATIENT
Start: 2024-05-28 | End: 2024-11-24

## 2024-06-20 ENCOUNTER — OFFICE VISIT (OUTPATIENT)
Dept: PULMONOLOGY | Age: 74
End: 2024-06-20
Payer: COMMERCIAL

## 2024-06-20 VITALS
SYSTOLIC BLOOD PRESSURE: 108 MMHG | HEIGHT: 72 IN | WEIGHT: 224.8 LBS | OXYGEN SATURATION: 97 % | HEART RATE: 91 BPM | DIASTOLIC BLOOD PRESSURE: 68 MMHG | BODY MASS INDEX: 30.45 KG/M2

## 2024-06-20 DIAGNOSIS — Z86.711 HISTORY OF PULMONARY EMBOLISM: ICD-10-CM

## 2024-06-20 DIAGNOSIS — R06.00 DYSPNEA, UNSPECIFIED TYPE: ICD-10-CM

## 2024-06-20 DIAGNOSIS — R91.8 GROUND GLASS OPACITY PRESENT ON IMAGING OF LUNG: ICD-10-CM

## 2024-06-20 DIAGNOSIS — J98.11 ATELECTASIS: Primary | ICD-10-CM

## 2024-06-20 DIAGNOSIS — R91.8 PULMONARY NODULES: ICD-10-CM

## 2024-06-20 PROCEDURE — 3017F COLORECTAL CA SCREEN DOC REV: CPT | Performed by: NURSE PRACTITIONER

## 2024-06-20 PROCEDURE — 3078F DIAST BP <80 MM HG: CPT | Performed by: NURSE PRACTITIONER

## 2024-06-20 PROCEDURE — 99214 OFFICE O/P EST MOD 30 MIN: CPT | Performed by: NURSE PRACTITIONER

## 2024-06-20 PROCEDURE — 1123F ACP DISCUSS/DSCN MKR DOCD: CPT | Performed by: NURSE PRACTITIONER

## 2024-06-20 PROCEDURE — G8417 CALC BMI ABV UP PARAM F/U: HCPCS | Performed by: NURSE PRACTITIONER

## 2024-06-20 PROCEDURE — 3074F SYST BP LT 130 MM HG: CPT | Performed by: NURSE PRACTITIONER

## 2024-06-20 PROCEDURE — 1036F TOBACCO NON-USER: CPT | Performed by: NURSE PRACTITIONER

## 2024-06-20 PROCEDURE — G8427 DOCREV CUR MEDS BY ELIG CLIN: HCPCS | Performed by: NURSE PRACTITIONER

## 2024-06-20 NOTE — PROGRESS NOTES
Atelectasis  2. History of pulmonary embolism  3. Ground glass opacity present on imaging of lung  4. Dyspnea, unspecified type  5. Pulmonary nodules    Transfer of care to Dr. Chaney    Vital Signs  /68   Pulse 91   Ht 1.829 m (6')   Wt 102 kg (224 lb 12.8 oz)   SpO2 97%   BMI 30.49 kg/m²      No follow-ups on file.            No data to display              Antonio expresses understanding of the information. He is agreeable with the treatment plan.           An electronic signature was used to authenticate this note.    --MARIANELA Jasso - CNP

## 2024-06-21 DIAGNOSIS — E11.9 TYPE 2 DIABETES MELLITUS WITHOUT COMPLICATION, WITHOUT LONG-TERM CURRENT USE OF INSULIN (HCC): ICD-10-CM

## 2024-06-21 RX ORDER — DAPAGLIFLOZIN 10 MG/1
10 TABLET, FILM COATED ORAL EVERY MORNING
Qty: 90 TABLET | Refills: 0 | Status: SHIPPED | OUTPATIENT
Start: 2024-06-21

## 2024-06-23 PROBLEM — R06.00 DYSPNEA: Status: ACTIVE | Noted: 2024-06-23

## 2024-08-01 ENCOUNTER — OFFICE VISIT (OUTPATIENT)
Dept: PULMONOLOGY | Age: 74
End: 2024-08-01
Payer: COMMERCIAL

## 2024-08-01 VITALS
DIASTOLIC BLOOD PRESSURE: 68 MMHG | OXYGEN SATURATION: 97 % | HEART RATE: 91 BPM | SYSTOLIC BLOOD PRESSURE: 102 MMHG | WEIGHT: 222.6 LBS | HEIGHT: 72 IN | BODY MASS INDEX: 30.15 KG/M2

## 2024-08-01 DIAGNOSIS — G47.10 HYPERSOMNIA: ICD-10-CM

## 2024-08-01 DIAGNOSIS — J84.9 ILD (INTERSTITIAL LUNG DISEASE) (HCC): Primary | ICD-10-CM

## 2024-08-01 DIAGNOSIS — E66.9 OBESITY (BMI 30-39.9): ICD-10-CM

## 2024-08-01 DIAGNOSIS — G47.33 OSA (OBSTRUCTIVE SLEEP APNEA): ICD-10-CM

## 2024-08-01 PROCEDURE — G8417 CALC BMI ABV UP PARAM F/U: HCPCS | Performed by: INTERNAL MEDICINE

## 2024-08-01 PROCEDURE — 99204 OFFICE O/P NEW MOD 45 MIN: CPT | Performed by: INTERNAL MEDICINE

## 2024-08-01 PROCEDURE — G8427 DOCREV CUR MEDS BY ELIG CLIN: HCPCS | Performed by: INTERNAL MEDICINE

## 2024-08-01 PROCEDURE — 1036F TOBACCO NON-USER: CPT | Performed by: INTERNAL MEDICINE

## 2024-08-01 PROCEDURE — 3078F DIAST BP <80 MM HG: CPT | Performed by: INTERNAL MEDICINE

## 2024-08-01 PROCEDURE — 1123F ACP DISCUSS/DSCN MKR DOCD: CPT | Performed by: INTERNAL MEDICINE

## 2024-08-01 PROCEDURE — 3017F COLORECTAL CA SCREEN DOC REV: CPT | Performed by: INTERNAL MEDICINE

## 2024-08-01 PROCEDURE — 3074F SYST BP LT 130 MM HG: CPT | Performed by: INTERNAL MEDICINE

## 2024-08-01 ASSESSMENT — ENCOUNTER SYMPTOMS
SHORTNESS OF BREATH: 1
EYE DISCHARGE: 0
COUGH: 0
BACK PAIN: 0
EYE ITCHING: 0
ABDOMINAL DISTENTION: 0
ABDOMINAL PAIN: 0

## 2024-08-01 ASSESSMENT — SLEEP AND FATIGUE QUESTIONNAIRES
HOW LIKELY ARE YOU TO NOD OFF OR FALL ASLEEP WHILE WATCHING TV: HIGH CHANCE OF DOZING
HOW LIKELY ARE YOU TO NOD OFF OR FALL ASLEEP IN A CAR, WHILE STOPPED FOR A FEW MINUTES IN TRAFFIC: WOULD NEVER DOZE
HOW LIKELY ARE YOU TO NOD OFF OR FALL ASLEEP WHILE SITTING AND READING: HIGH CHANCE OF DOZING
ESS TOTAL SCORE: 11
HOW LIKELY ARE YOU TO NOD OFF OR FALL ASLEEP WHILE SITTING INACTIVE IN A PUBLIC PLACE: WOULD NEVER DOZE
HOW LIKELY ARE YOU TO NOD OFF OR FALL ASLEEP WHILE LYING DOWN TO REST IN THE AFTERNOON WHEN CIRCUMSTANCES PERMIT: HIGH CHANCE OF DOZING
HOW LIKELY ARE YOU TO NOD OFF OR FALL ASLEEP WHEN YOU ARE A PASSENGER IN A CAR FOR AN HOUR WITHOUT A BREAK: WOULD NEVER DOZE
HOW LIKELY ARE YOU TO NOD OFF OR FALL ASLEEP WHILE SITTING AND TALKING TO SOMEONE: WOULD NEVER DOZE
HOW LIKELY ARE YOU TO NOD OFF OR FALL ASLEEP WHILE SITTING QUIETLY AFTER LUNCH WITHOUT ALCOHOL: MODERATE CHANCE OF DOZING

## 2024-08-01 NOTE — PROGRESS NOTES
Antonio Sandra  1950  Referring Provider: Itz Amaro, [facility] - General     Subjective:     Chief Complaint   Patient presents with    Follow-up     Annete patient, possible ILD, PFT Results        HPI  Antonio is a 74 y.o. male has been referred for the abnormal CT chest which was done on 04/09/24 which showed:    Patchy groundglass densities in the lungs bilaterally, suggesting an inflammatory or infectious process. Correlate clinically.     No other acute findings in the chest.    This was done after he had a fall.      He has no h/o smoking. He has cough little, little phlegm, no hemoptysis, no loss of weight, good appetite, SOBOE some. He had a PFT done on 05/14/24 which showed some restrictive lung disease with a TLC of 64% and DLCO 103%. He had no 6 MWT. He is not on Amiodarone, MTX, Nitrofurantoin. He worked as a  for [employer]. He has a dog at home.He has no hot  tub at home.     He snores and not sure if he stops breathing. He goes to bed at 11 PM and gets up at 6 AM and he is tired. He has no morning headaches. He is sleepy and tired during the day time. He never woke up choking or gasping for air, no dry mouth, no palpitations.     Current Outpatient Medications   Medication Sig Dispense Refill    dapagliflozin (FARXIGA) 10 MG tablet TAKE 1 TABLET BY MOUTH EVERY DAY IN THE MORNING 90 tablet 0    metFORMIN (GLUCOPHAGE) 500 MG tablet TAKE 2 TABS BY MOUTH IN THE MORNING, 1 TAB AT NOON, AND 2 TABS IN THE EVENING WITH MEALS 450 tablet 0    glipiZIDE (GLUCOTROL XL) 2.5 MG extended release tablet Take 1 tablet by mouth daily 90 tablet 1    dilTIAZem (CARDIZEM CD) 300 MG extended release capsule Take 1 capsule by mouth daily 90 capsule 1    lisinopril (PRINIVIL;ZESTRIL) 5 MG tablet TAKE 1 TABLET BY MOUTH EVERY DAY 90 tablet 1    pioglitazone (ACTOS) 30 MG tablet Take 1 tablet by mouth daily 90 tablet 1    atorvastatin (LIPITOR) 20 MG tablet TAKE 1 TABLET BY MOUTH ONE TIME A DAY 90 tablet 1

## 2024-08-01 NOTE — ASSESSMENT & PLAN NOTE
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

## 2024-08-02 ENCOUNTER — OFFICE VISIT (OUTPATIENT)
Dept: FAMILY MEDICINE CLINIC | Age: 74
End: 2024-08-02

## 2024-08-02 ENCOUNTER — HOSPITAL ENCOUNTER (OUTPATIENT)
Age: 74
Discharge: HOME | End: 2024-08-02
Payer: MEDICARE

## 2024-08-02 VITALS
BODY MASS INDEX: 29.8 KG/M2 | OXYGEN SATURATION: 100 % | HEART RATE: 72 BPM | WEIGHT: 220 LBS | DIASTOLIC BLOOD PRESSURE: 80 MMHG | RESPIRATION RATE: 18 BRPM | SYSTOLIC BLOOD PRESSURE: 130 MMHG | HEIGHT: 72 IN

## 2024-08-02 DIAGNOSIS — G47.33 OSA (OBSTRUCTIVE SLEEP APNEA): ICD-10-CM

## 2024-08-02 DIAGNOSIS — E78.49 OTHER HYPERLIPIDEMIA: ICD-10-CM

## 2024-08-02 DIAGNOSIS — J84.9 ILD (INTERSTITIAL LUNG DISEASE) (HCC): ICD-10-CM

## 2024-08-02 DIAGNOSIS — E11.69 TYPE 2 DIABETES MELLITUS WITH OTHER SPECIFIED COMPLICATION, WITHOUT LONG-TERM CURRENT USE OF INSULIN (HCC): Primary | ICD-10-CM

## 2024-08-02 DIAGNOSIS — E11.69 TYPE 2 DIABETES MELLITUS WITH OTHER SPECIFIED COMPLICATION, WITHOUT LONG-TERM CURRENT USE OF INSULIN (HCC): ICD-10-CM

## 2024-08-02 DIAGNOSIS — N40.0 BENIGN PROSTATIC HYPERPLASIA WITHOUT LOWER URINARY TRACT SYMPTOMS: Chronic | ICD-10-CM

## 2024-08-02 DIAGNOSIS — I10 HYPERTENSION, ESSENTIAL: ICD-10-CM

## 2024-08-02 LAB
CHOLEST SERPL-MCNC: 159 MG/DL
HDLC SERPL-MCNC: 60 MG/DL
LDLC SERPL CALC-MCNC: 80 MG/DL
TRIGL SERPL-MCNC: 95 MG/DL

## 2024-08-02 PROCEDURE — 80061 LIPID PANEL: CPT

## 2024-08-02 PROCEDURE — 36415 COLL VENOUS BLD VENIPUNCTURE: CPT

## 2024-08-02 PROCEDURE — 83036 HEMOGLOBIN GLYCOSYLATED A1C: CPT

## 2024-08-02 ASSESSMENT — ENCOUNTER SYMPTOMS
SHORTNESS OF BREATH: 0
NAUSEA: 0
DIARRHEA: 0
BLOOD IN STOOL: 0
VOMITING: 0
TROUBLE SWALLOWING: 0
CHEST TIGHTNESS: 0
EYE PAIN: 0
WHEEZING: 0
ABDOMINAL PAIN: 0

## 2024-08-02 NOTE — PROGRESS NOTES
8/2/2024    Antonio Sandra    Chief Complaint   Patient presents with    3 Month Follow-Up     No complaints        HPI  History was obtained from the patient.  Antonio is a 74 y.o. male who presents today with routine recheck.  Patient follows regularly with cardiology and pulmonary medicine.  Currently quite busy taking care of his wife who had recent joint replacement.  Home blood sugars been averaging about 130 and overall been fair A1c 's have been excellent.    He has a past history of diabetes mellitus type 2, BPH, hypertension, depression, hyperlipidemia, sleep apnea, and interstitial lung disease.    Overall has been feeling well he is lost a bit of weight trying to stay active but does not have an official exercise program currently.    REVIEW OF SYMPTOMS    Review of Systems   Constitutional:  Negative for activity change and fatigue.   HENT:  Negative for congestion, hearing loss, mouth sores and trouble swallowing.    Eyes:  Negative for pain and visual disturbance.   Respiratory:  Negative for chest tightness, shortness of breath and wheezing.         Patient with possible interstitial lung disease follows with pulmonary medicine has history of sleep apnea being reevaluated   Cardiovascular:  Negative for chest pain and palpitations.        Patient with history of hypertension hyperlipidemia   Gastrointestinal:  Negative for abdominal pain, blood in stool, diarrhea, nausea and vomiting.   Endocrine: Negative for polydipsia and polyuria.        Patient with history of diabetes mellitus type 2 blood sugars average about 130   Genitourinary:  Positive for difficulty urinating. Negative for dysuria, frequency and urgency.        Patient with history of BPH   Musculoskeletal:  Negative for arthralgias, gait problem and neck stiffness.   Skin:  Negative for rash.   Allergic/Immunologic: Negative for environmental allergies.   Neurological:  Negative for dizziness, seizures, speech difficulty and weakness.

## 2024-08-05 ENCOUNTER — HOSPITAL ENCOUNTER (OUTPATIENT)
Age: 74
Setting detail: SPECIMEN
Discharge: HOME | End: 2024-08-05
Payer: MEDICARE

## 2024-08-05 LAB
ESTIMATED AVERAGE GLUCOSE: 137 MG/DL
HBA1C MFR BLD: 6.4 % (ref 4.2–6.3)

## 2024-08-05 PROCEDURE — 83036 HEMOGLOBIN GLYCOSYLATED A1C: CPT

## 2024-08-09 ENCOUNTER — TELEPHONE (OUTPATIENT)
Dept: FAMILY MEDICINE CLINIC | Age: 74
End: 2024-08-09

## 2024-08-09 NOTE — TELEPHONE ENCOUNTER
----- Message from Jennifer Olivarez sent at 8/9/2024 11:08 AM EDT -----  Regarding: ECC Appointment Request  ECC Appointment Request    Patient needs appointment for ECC Appointment Type: Annual Visit.    Patient Requested Dates(s): August 23  Patient Requested Time:morning  Provider Name:  SCOTTY FPS AWV LPN    Reason for Appointment Request: Other  [facility]   --------------------------------------------------------------------------------------------------------------------------    Relationship to Patient: Self     Call Back Information: OK to leave message on voicemail  Preferred Call Back Number: Phone [phone]

## 2024-08-13 ENCOUNTER — TELEPHONE (OUTPATIENT)
Dept: FAMILY MEDICINE CLINIC | Age: 74
End: 2024-08-13

## 2024-08-13 NOTE — TELEPHONE ENCOUNTER
Lm for patient to call/reschedule AWV with LPN. He told Rhianna he needs to reschedule his 8/22 AWV/LPN.

## 2024-08-14 ENCOUNTER — HOSPITAL ENCOUNTER (OUTPATIENT)
Dept: CT IMAGING | Age: 74
Discharge: HOME | End: 2024-08-14
Attending: INTERNAL MEDICINE
Payer: MEDICARE

## 2024-08-14 DIAGNOSIS — J84.9 ILD (INTERSTITIAL LUNG DISEASE) (HCC): ICD-10-CM

## 2024-08-14 PROCEDURE — 71250 CT THORAX DX C-: CPT

## 2024-08-15 ENCOUNTER — TELEMEDICINE (OUTPATIENT)
Dept: FAMILY MEDICINE CLINIC | Age: 74
End: 2024-08-15

## 2024-08-15 DIAGNOSIS — Z00.00 MEDICARE ANNUAL WELLNESS VISIT, SUBSEQUENT: Primary | ICD-10-CM

## 2024-08-15 SDOH — HEALTH STABILITY: PHYSICAL HEALTH: ON AVERAGE, HOW MANY MINUTES DO YOU ENGAGE IN EXERCISE AT THIS LEVEL?: 30 MIN

## 2024-08-15 SDOH — HEALTH STABILITY: PHYSICAL HEALTH: ON AVERAGE, HOW MANY DAYS PER WEEK DO YOU ENGAGE IN MODERATE TO STRENUOUS EXERCISE (LIKE A BRISK WALK)?: 2 DAYS

## 2024-08-15 ASSESSMENT — PATIENT HEALTH QUESTIONNAIRE - PHQ9
10. IF YOU CHECKED OFF ANY PROBLEMS, HOW DIFFICULT HAVE THESE PROBLEMS MADE IT FOR YOU TO DO YOUR WORK, TAKE CARE OF THINGS AT HOME, OR GET ALONG WITH OTHER PEOPLE: NOT DIFFICULT AT ALL
5. POOR APPETITE OR OVEREATING: NOT AT ALL
8. MOVING OR SPEAKING SO SLOWLY THAT OTHER PEOPLE COULD HAVE NOTICED. OR THE OPPOSITE, BEING SO FIGETY OR RESTLESS THAT YOU HAVE BEEN MOVING AROUND A LOT MORE THAN USUAL: NOT AT ALL
SUM OF ALL RESPONSES TO PHQ QUESTIONS 1-9: 2
2. FEELING DOWN, DEPRESSED OR HOPELESS: NOT AT ALL
SUM OF ALL RESPONSES TO PHQ9 QUESTIONS 1 & 2: 0
SUM OF ALL RESPONSES TO PHQ QUESTIONS 1-9: 2
2. FEELING DOWN, DEPRESSED OR HOPELESS: NOT AT ALL
SUM OF ALL RESPONSES TO PHQ QUESTIONS 1-9: 2
1. LITTLE INTEREST OR PLEASURE IN DOING THINGS: NOT AT ALL
7. TROUBLE CONCENTRATING ON THINGS, SUCH AS READING THE NEWSPAPER OR WATCHING TELEVISION: NOT AT ALL
3. TROUBLE FALLING OR STAYING ASLEEP: NOT AT ALL
SUM OF ALL RESPONSES TO PHQ QUESTIONS 1-9: 0
9. THOUGHTS THAT YOU WOULD BE BETTER OFF DEAD, OR OF HURTING YOURSELF: NOT AT ALL
SUM OF ALL RESPONSES TO PHQ QUESTIONS 1-9: 0
4. FEELING TIRED OR HAVING LITTLE ENERGY: MORE THAN HALF THE DAYS
SUM OF ALL RESPONSES TO PHQ QUESTIONS 1-9: 0
SUM OF ALL RESPONSES TO PHQ QUESTIONS 1-9: 2
6. FEELING BAD ABOUT YOURSELF - OR THAT YOU ARE A FAILURE OR HAVE LET YOURSELF OR YOUR FAMILY DOWN: NOT AT ALL
SUM OF ALL RESPONSES TO PHQ9 QUESTIONS 1 & 2: 0
1. LITTLE INTEREST OR PLEASURE IN DOING THINGS: NOT AT ALL
SUM OF ALL RESPONSES TO PHQ QUESTIONS 1-9: 0

## 2024-08-15 ASSESSMENT — LIFESTYLE VARIABLES
HOW OFTEN DO YOU HAVE A DRINK CONTAINING ALCOHOL: NEVER
HOW OFTEN DO YOU HAVE SIX OR MORE DRINKS ON ONE OCCASION: 1
HOW MANY STANDARD DRINKS CONTAINING ALCOHOL DO YOU HAVE ON A TYPICAL DAY: PATIENT DOES NOT DRINK
HOW OFTEN DO YOU HAVE A DRINK CONTAINING ALCOHOL: 1
HOW OFTEN DO YOU HAVE A DRINK CONTAINING ALCOHOL: NEVER
HOW MANY STANDARD DRINKS CONTAINING ALCOHOL DO YOU HAVE ON A TYPICAL DAY: 0
HOW MANY STANDARD DRINKS CONTAINING ALCOHOL DO YOU HAVE ON A TYPICAL DAY: PATIENT DOES NOT DRINK

## 2024-08-15 NOTE — PATIENT INSTRUCTIONS
Personalized Preventive Plan for Antonio Sandra - 8/15/2024  Medicare offers a range of preventive health benefits. Some of the tests and screenings are paid in full while other may be subject to a deductible, co-insurance, and/or copay.    Some of these benefits include a comprehensive review of your medical history including lifestyle, illnesses that may run in your family, and various assessments and screenings as appropriate.    After reviewing your medical record and screening and assessments performed today your provider may have ordered immunizations, labs, imaging, and/or referrals for you.  A list of these orders (if applicable) as well as your Preventive Care list are included within your After Visit Summary for your review.    Other Preventive Recommendations:    A preventive eye exam performed by an eye specialist is recommended every 1-2 years to screen for glaucoma; cataracts, macular degeneration, and other eye disorders.  A preventive dental visit is recommended every 6 months.  Try to get at least 150 minutes of exercise per week or 10,000 steps per day on a pedometer .  Order or download the FREE \"Exercise & Physical Activity: Your Everyday Guide\" from [employer]. Call 1-642.741.6378 or search [employer] online.  You need 9198-5193 mg of calcium and 7198-4941 IU of vitamin D per day. It is possible to meet your calcium requirement with diet alone, but a vitamin D supplement is usually necessary to meet this goal.  When exposed to the sun, use a sunscreen that protects against both UVA and UVB radiation with an SPF of 30 or greater. Reapply every 2 to 3 hours or after sweating, drying off with a towel, or swimming.  Always wear a seat belt when traveling in a car. Always wear a helmet when riding a bicycle or motorcycle.

## 2024-08-15 NOTE — PROGRESS NOTES
Medicare Annual Wellness Visit    Antonio Sandra is here for Medicare AWV    Assessment & Plan   Medicare annual wellness visit, subsequent  Recommendations for Preventive Services Due: see orders and patient instructions/AVS.  Recommended screening schedule for the next 5-10 years is provided to the patient in written form: see Patient Instructions/AVS.     No follow-ups on file.     Subjective       Patient's complete Health Risk Assessment and screening values have been reviewed and are found in Flowsheets. The following problems were reviewed today and where indicated follow up appointments were made and/or referrals ordered.    Positive Risk Factor Screenings with Interventions:    Fall Risk:  Do you feel unsteady or are you worried about falling? : (!) yes (when 1st getting out of bed to get his balance before standing up)  2 or more falls in past year?: no  Fall with injury in past year?: (!) yes (went to ED, CT negative, neg for concussion)     Interventions:    Patient comments: States he did go to the ED when he fell and hit his head. His CT was negative, and he did not have a concussion. Patient states when he first wakes up and is getting out of bed, he makes sure he gets his balance prior to standing up.  Reviewed medications, home hazards, visual acuity, and co-morbidities that can increase risk for falls  Patient declines any further evaluation or treatment             Inactivity:  On average, how many days per week do you engage in moderate to strenuous exercise (like a brisk walk)?: 2 days (!) Abnormal  On average, how many minutes do you engage in exercise at this level?: 50 min  Interventions:  Patient declined any further interventions or treatment                         Objective    Patient-Reported Vitals  No data recorded     Unable to obtain 3 vital signs due to patient not having equipment to take blood pressure/temperature.                Allergies   Allergen Reactions    Tree Extract

## 2024-09-02 ENCOUNTER — HOSPITAL ENCOUNTER (OUTPATIENT)
Dept: SLEEP CENTER | Age: 74
Discharge: HOME | End: 2024-09-02
Payer: MEDICARE

## 2024-09-02 DIAGNOSIS — G47.33 OSA (OBSTRUCTIVE SLEEP APNEA): ICD-10-CM

## 2024-09-02 PROCEDURE — 95810 POLYSOM 6/> YRS 4/> PARAM: CPT

## 2024-09-05 RX ORDER — LISINOPRIL 5 MG/1
TABLET ORAL
Qty: 90 TABLET | Refills: 1 | Status: SHIPPED | OUTPATIENT
Start: 2024-09-05

## 2024-09-20 ENCOUNTER — OFFICE VISIT (OUTPATIENT)
Dept: PULMONOLOGY | Age: 74
End: 2024-09-20
Payer: MEDICARE

## 2024-09-20 VITALS
BODY MASS INDEX: 30.88 KG/M2 | HEIGHT: 72 IN | DIASTOLIC BLOOD PRESSURE: 60 MMHG | HEART RATE: 99 BPM | OXYGEN SATURATION: 92 % | WEIGHT: 228 LBS | SYSTOLIC BLOOD PRESSURE: 120 MMHG | RESPIRATION RATE: 16 BRPM

## 2024-09-20 DIAGNOSIS — J84.9 ILD (INTERSTITIAL LUNG DISEASE) (HCC): ICD-10-CM

## 2024-09-20 DIAGNOSIS — R91.8 PULMONARY NODULES: ICD-10-CM

## 2024-09-20 DIAGNOSIS — E66.9 OBESITY (BMI 30-39.9): ICD-10-CM

## 2024-09-20 DIAGNOSIS — G47.33 OSA (OBSTRUCTIVE SLEEP APNEA): ICD-10-CM

## 2024-09-20 DIAGNOSIS — G47.34 SLEEP RELATED HYPOXIA: ICD-10-CM

## 2024-09-20 DIAGNOSIS — G47.10 HYPERSOMNIA: Primary | ICD-10-CM

## 2024-09-20 PROCEDURE — 1036F TOBACCO NON-USER: CPT | Performed by: INTERNAL MEDICINE

## 2024-09-20 PROCEDURE — 3017F COLORECTAL CA SCREEN DOC REV: CPT | Performed by: INTERNAL MEDICINE

## 2024-09-20 PROCEDURE — 3078F DIAST BP <80 MM HG: CPT | Performed by: INTERNAL MEDICINE

## 2024-09-20 PROCEDURE — 3074F SYST BP LT 130 MM HG: CPT | Performed by: INTERNAL MEDICINE

## 2024-09-20 PROCEDURE — G8427 DOCREV CUR MEDS BY ELIG CLIN: HCPCS | Performed by: INTERNAL MEDICINE

## 2024-09-20 PROCEDURE — G8417 CALC BMI ABV UP PARAM F/U: HCPCS | Performed by: INTERNAL MEDICINE

## 2024-09-20 PROCEDURE — 1123F ACP DISCUSS/DSCN MKR DOCD: CPT | Performed by: INTERNAL MEDICINE

## 2024-09-20 PROCEDURE — 99215 OFFICE O/P EST HI 40 MIN: CPT | Performed by: INTERNAL MEDICINE

## 2024-09-20 ASSESSMENT — ENCOUNTER SYMPTOMS
COUGH: 0
BACK PAIN: 0
SHORTNESS OF BREATH: 0
ABDOMINAL DISTENTION: 0
EYE ITCHING: 0
EYE DISCHARGE: 0
ABDOMINAL PAIN: 0

## 2024-09-24 ENCOUNTER — TELEPHONE (OUTPATIENT)
Dept: PULMONOLOGY | Age: 74
End: 2024-09-24

## 2024-09-24 DIAGNOSIS — I10 HYPERTENSION, ESSENTIAL: ICD-10-CM

## 2024-09-24 RX ORDER — DILTIAZEM HYDROCHLORIDE 300 MG/1
300 CAPSULE, COATED, EXTENDED RELEASE ORAL DAILY
Qty: 90 CAPSULE | Refills: 1 | Status: SHIPPED | OUTPATIENT
Start: 2024-09-24

## 2024-09-25 DIAGNOSIS — R09.02 HYPOXIA: Primary | ICD-10-CM

## 2024-09-29 DIAGNOSIS — E78.49 OTHER HYPERLIPIDEMIA: ICD-10-CM

## 2024-09-29 DIAGNOSIS — E11.9 TYPE 2 DIABETES MELLITUS WITHOUT COMPLICATION, WITHOUT LONG-TERM CURRENT USE OF INSULIN (HCC): ICD-10-CM

## 2024-09-30 RX ORDER — ATORVASTATIN CALCIUM 20 MG/1
TABLET, FILM COATED ORAL
Qty: 90 TABLET | Refills: 1 | Status: SHIPPED | OUTPATIENT
Start: 2024-09-30

## 2024-09-30 RX ORDER — DAPAGLIFLOZIN 10 MG/1
10 TABLET, FILM COATED ORAL EVERY MORNING
Qty: 90 TABLET | Refills: 0 | Status: SHIPPED | OUTPATIENT
Start: 2024-09-30

## 2024-11-02 DIAGNOSIS — E11.9 TYPE 2 DIABETES MELLITUS WITHOUT COMPLICATION, WITHOUT LONG-TERM CURRENT USE OF INSULIN (HCC): ICD-10-CM

## 2024-11-04 RX ORDER — GLIPIZIDE 2.5 MG/1
2.5 TABLET, EXTENDED RELEASE ORAL DAILY
Qty: 90 TABLET | Refills: 1 | Status: SHIPPED | OUTPATIENT
Start: 2024-11-04 | End: 2025-05-03

## 2024-11-07 DIAGNOSIS — E11.9 TYPE 2 DIABETES MELLITUS WITHOUT COMPLICATION, WITHOUT LONG-TERM CURRENT USE OF INSULIN (HCC): ICD-10-CM

## 2024-11-07 NOTE — TELEPHONE ENCOUNTER
LAST APPT THIS DEPT  08/15/2024 - Comp  NEXT APPT  With Family Medicine (FLAVIO HUGO MD)  12/04/2024 at 9:30 AM    Pended Rx

## 2024-11-08 RX ORDER — LISINOPRIL 5 MG/1
TABLET ORAL
Qty: 90 TABLET | Refills: 1 | Status: SHIPPED | OUTPATIENT
Start: 2024-11-08

## 2024-11-08 RX ORDER — PIOGLITAZONEHYDROCHLORIDE 30 MG/1
30 TABLET ORAL DAILY
Qty: 90 TABLET | Refills: 1 | Status: SHIPPED | OUTPATIENT
Start: 2024-11-08

## 2024-11-12 ENCOUNTER — OFFICE VISIT (OUTPATIENT)
Dept: CARDIOLOGY CLINIC | Age: 74
End: 2024-11-12

## 2024-11-12 VITALS
OXYGEN SATURATION: 98 % | HEIGHT: 72 IN | BODY MASS INDEX: 31.48 KG/M2 | WEIGHT: 232.4 LBS | DIASTOLIC BLOOD PRESSURE: 72 MMHG | HEART RATE: 71 BPM | SYSTOLIC BLOOD PRESSURE: 138 MMHG

## 2024-11-12 DIAGNOSIS — E11.9 TYPE 2 DIABETES MELLITUS WITHOUT COMPLICATION, WITHOUT LONG-TERM CURRENT USE OF INSULIN (HCC): Chronic | ICD-10-CM

## 2024-11-12 DIAGNOSIS — G47.33 OSA (OBSTRUCTIVE SLEEP APNEA): ICD-10-CM

## 2024-11-12 DIAGNOSIS — E11.69 TYPE 2 DIABETES MELLITUS WITH OTHER SPECIFIED COMPLICATION, WITHOUT LONG-TERM CURRENT USE OF INSULIN (HCC): ICD-10-CM

## 2024-11-12 DIAGNOSIS — Z86.711 HISTORY OF PULMONARY EMBOLISM: ICD-10-CM

## 2024-11-12 DIAGNOSIS — I71.40 ABDOMINAL AORTIC ANEURYSM (AAA) WITHOUT RUPTURE, UNSPECIFIED PART (HCC): ICD-10-CM

## 2024-11-12 DIAGNOSIS — R94.31 ABNORMAL EKG: ICD-10-CM

## 2024-11-12 DIAGNOSIS — I25.10 ASCVD (ARTERIOSCLEROTIC CARDIOVASCULAR DISEASE): Primary | ICD-10-CM

## 2024-11-12 DIAGNOSIS — E78.49 OTHER HYPERLIPIDEMIA: ICD-10-CM

## 2024-11-12 DIAGNOSIS — I10 HYPERTENSION, ESSENTIAL: ICD-10-CM

## 2024-11-12 RX ORDER — LOSARTAN POTASSIUM 25 MG/1
25 TABLET ORAL DAILY
Qty: 90 TABLET | Refills: 1 | Status: SHIPPED | OUTPATIENT
Start: 2024-11-12

## 2024-11-12 RX ORDER — ASPIRIN 81 MG/1
81 TABLET ORAL EVERY OTHER DAY
Qty: 90 TABLET | Refills: 0 | Status: SHIPPED | OUTPATIENT
Start: 2024-11-12

## 2024-11-12 NOTE — PROGRESS NOTES
dimensions. It is ectatic in its course. No significant aneurysm. Ectasia of the common iliac arteries bilaterally, measuring 1.6 cm in diameter. Splenic vein, SMV, PV and hepatic   Wife had multiple questions all were addressed CT scans and ultrasounds over the last several years were reviewed together interpreted by myself echo shows ascending aorta 4.0 cm    History of pulmonary embolism  It was an inciting event back in 2012 no events since then continue to monitor    Hypertension, essential  Blood pressure is well-controlled on lisinopril 5 mg daily and Cardizem  Change lisinopril to losartan     Abnormal EKG   Given risk factors , however echo and stress test were all normal and 2024     Dyslipidemia :  All available lab work was reviewed.  Patient was advised to repeat lab work before next visit. Necessary orders were placed , instructions given by myself       Counseled extensively and medication compliance urged.  We discussed that for the  prevention of ASCVD our  goal is aggressive risk modification.Patient is encouraged to exercise if they can , educated about  brisk walk for 30 minutes  at least 3 to 4 times a week if there are no physical limitations  Various goals were discussed and questions answered. Continue current medications. Appropriate prescriptions are addressed and refills ordered.  Questions answered and patient verbalizes understanding.  Call for any problems, questions, or concerns.Greater than 60 % of time spent counseling besides reviewing data and images     Continue all other medications of all above medical condition listed as is.    Return in about 6 months (around 5/12/2025).    Please note this report has been partially produced using speech recognition software and may contain errors related to that system including errors in grammar, punctuation, and spelling, as well as words and phrases that may be inappropriate. If there are any questions or concerns please feel free to

## 2024-11-13 LAB — DIABETIC RETINOPATHY: NEGATIVE

## 2024-11-14 ENCOUNTER — TELEPHONE (OUTPATIENT)
Dept: CARDIOLOGY CLINIC | Age: 74
End: 2024-11-14

## 2024-11-14 NOTE — TELEPHONE ENCOUNTER
LMx1      Echo (TTE) limited        Limited echo to asses  dilated ascending aorta.    Left Ventricle: Normal left ventricular systolic function with a visually estimated EF of 55 - 60%. Left ventricle size is normal. Mildly increased wall thickness. Normal wall motion.    Aorta: Normal sized aortic root and abdominal aorta. Mildly dilated ascending aorta. Ao ascending diameter is 4.0 cm.    Pericardium: No pericardial effusion.      Normal left ventricular systolic function with a visually estimated EF of 55 - 60%. Left ventricle size is normal. Mildly increased wall thickness. Normal wall motion.

## 2024-11-21 ENCOUNTER — HOSPITAL ENCOUNTER (OUTPATIENT)
Dept: SLEEP CENTER | Age: 74
Discharge: HOME | End: 2024-11-21
Payer: MEDICARE

## 2024-11-21 DIAGNOSIS — G47.10 HYPERSOMNIA: ICD-10-CM

## 2024-11-21 PROCEDURE — 95810 POLYSOM 6/> YRS 4/> PARAM: CPT

## 2024-11-22 DIAGNOSIS — E11.9 TYPE 2 DIABETES MELLITUS WITHOUT COMPLICATION, WITHOUT LONG-TERM CURRENT USE OF INSULIN (HCC): ICD-10-CM

## 2024-11-22 RX ORDER — GLIPIZIDE 2.5 MG/1
2.5 TABLET, EXTENDED RELEASE ORAL DAILY
Qty: 90 TABLET | Refills: 1 | OUTPATIENT
Start: 2024-11-22 | End: 2025-05-21

## 2024-12-02 ENCOUNTER — OFFICE VISIT (OUTPATIENT)
Dept: PULMONOLOGY | Age: 74
End: 2024-12-02
Payer: MEDICARE

## 2024-12-02 VITALS
BODY MASS INDEX: 32.07 KG/M2 | SYSTOLIC BLOOD PRESSURE: 124 MMHG | DIASTOLIC BLOOD PRESSURE: 68 MMHG | WEIGHT: 236.8 LBS | HEART RATE: 102 BPM | OXYGEN SATURATION: 92 % | HEIGHT: 72 IN

## 2024-12-02 DIAGNOSIS — J84.9 ILD (INTERSTITIAL LUNG DISEASE) (HCC): ICD-10-CM

## 2024-12-02 DIAGNOSIS — E66.9 OBESITY (BMI 30-39.9): ICD-10-CM

## 2024-12-02 DIAGNOSIS — G47.33 OSA (OBSTRUCTIVE SLEEP APNEA): Primary | ICD-10-CM

## 2024-12-02 DIAGNOSIS — G47.10 HYPERSOMNIA: ICD-10-CM

## 2024-12-02 PROCEDURE — 3017F COLORECTAL CA SCREEN DOC REV: CPT | Performed by: INTERNAL MEDICINE

## 2024-12-02 PROCEDURE — 3074F SYST BP LT 130 MM HG: CPT | Performed by: INTERNAL MEDICINE

## 2024-12-02 PROCEDURE — G8417 CALC BMI ABV UP PARAM F/U: HCPCS | Performed by: INTERNAL MEDICINE

## 2024-12-02 PROCEDURE — 99214 OFFICE O/P EST MOD 30 MIN: CPT | Performed by: INTERNAL MEDICINE

## 2024-12-02 PROCEDURE — 1123F ACP DISCUSS/DSCN MKR DOCD: CPT | Performed by: INTERNAL MEDICINE

## 2024-12-02 PROCEDURE — G8428 CUR MEDS NOT DOCUMENT: HCPCS | Performed by: INTERNAL MEDICINE

## 2024-12-02 PROCEDURE — 1036F TOBACCO NON-USER: CPT | Performed by: INTERNAL MEDICINE

## 2024-12-02 PROCEDURE — 3078F DIAST BP <80 MM HG: CPT | Performed by: INTERNAL MEDICINE

## 2024-12-02 PROCEDURE — G8482 FLU IMMUNIZE ORDER/ADMIN: HCPCS | Performed by: INTERNAL MEDICINE

## 2024-12-02 ASSESSMENT — ENCOUNTER SYMPTOMS
COUGH: 0
EYE ITCHING: 0
EYE DISCHARGE: 0
BACK PAIN: 0
ABDOMINAL PAIN: 0
SHORTNESS OF BREATH: 0
ABDOMINAL DISTENTION: 0

## 2024-12-02 NOTE — PROGRESS NOTES
Take 1 tablet by mouth daily (Patient not taking: Reported on 12/2/2024) 90 tablet 1     No current facility-administered medications for this visit.       Allergies   Allergen Reactions    Tree Extract     Vicodin [Hydrocodone-Acetaminophen] Nausea And Vomiting       Past Medical History:   Diagnosis Date    Benign prostatic hyperplasia     DVT (deep venous thrombosis) (HCC)     H/O echocardiogram 05/01/2024    EF of 55 - 60%. Aorta: Normal sized aortic root. Dilated ascending aorta. Ao ascending diameter is 4.0 cm. Aortic Valve: Mild sclerosis of the aortic valve, noncoronary cusp. Tricuspid Valve: Normal RVSP.    Hemochromatosis     History of Holter monitoring 2024    Monitor worn from 18th April to 20 April 2024 average heart rate was 79 bpm lowest heart of 64 highest heart was 170 short run of SVT noted for 3-4 beats at 7:31 AM at heart rate of 170.  Supraventricular ectopy burden was 0.1% PVC burden was less than .01%. No significant sustained arrhythmias recorded    History of pulmonary embolism     Hyperlipidemia     Hypertension, essential     Kidney stone     in er for kidney stones(1/14/13), had Leni, saw Dr Reddy, did pass one\"    Major depressive disorder     Mantoux: positive     Polycythemia vera(238.4) dx late 1980s    \"per wife-(1/18/13) \"according to Dr Amin he does not have this but he does have high iron\"    Pulmonary embolism (HCC)     old chart gives hx brant PE with right lower DVT 7/2012(pc)(had scope left knee 4/2012)- had Filter placement done 7/31/2012    Tinnitus     Type 2 diabetes mellitus (HCC)     dx 2003       Past Surgical History:   Procedure Laterality Date    COLONOSCOPY      KNEE ARTHROSCOPY Right 04/2012    LITHOTRIPSY      SKIN BIOPSY  June 2012    Spermatocelectomy    VASECTOMY  1980    VENA CAVA FILTER PLACEMENT         Social History     Socioeconomic History    Marital status:    Tobacco Use    Smoking status: Never    Smokeless tobacco: Never    Tobacco

## 2024-12-04 ENCOUNTER — OFFICE VISIT (OUTPATIENT)
Dept: FAMILY MEDICINE CLINIC | Age: 74
End: 2024-12-04
Payer: MEDICARE

## 2024-12-04 VITALS
BODY MASS INDEX: 31.46 KG/M2 | SYSTOLIC BLOOD PRESSURE: 112 MMHG | RESPIRATION RATE: 18 BRPM | OXYGEN SATURATION: 95 % | DIASTOLIC BLOOD PRESSURE: 76 MMHG | WEIGHT: 232.3 LBS | HEART RATE: 86 BPM | HEIGHT: 72 IN

## 2024-12-04 DIAGNOSIS — E11.69 TYPE 2 DIABETES MELLITUS WITH OTHER SPECIFIED COMPLICATION, WITHOUT LONG-TERM CURRENT USE OF INSULIN (HCC): Primary | ICD-10-CM

## 2024-12-04 DIAGNOSIS — D75.1 POLYCYTHEMIA: Chronic | ICD-10-CM

## 2024-12-04 DIAGNOSIS — E78.49 OTHER HYPERLIPIDEMIA: ICD-10-CM

## 2024-12-04 DIAGNOSIS — E66.9 OBESITY (BMI 30-39.9): ICD-10-CM

## 2024-12-04 DIAGNOSIS — I10 HYPERTENSION, ESSENTIAL: ICD-10-CM

## 2024-12-04 DIAGNOSIS — G47.33 OSA (OBSTRUCTIVE SLEEP APNEA): ICD-10-CM

## 2024-12-04 DIAGNOSIS — N40.0 BENIGN PROSTATIC HYPERPLASIA WITHOUT LOWER URINARY TRACT SYMPTOMS: ICD-10-CM

## 2024-12-04 DIAGNOSIS — E11.69 TYPE 2 DIABETES MELLITUS WITH OTHER SPECIFIED COMPLICATION, WITHOUT LONG-TERM CURRENT USE OF INSULIN (HCC): ICD-10-CM

## 2024-12-04 LAB
ALBUMIN SERPL-MCNC: 4.7 G/DL (ref 3.4–5)
ALBUMIN/GLOB SERPL: 2.1 {RATIO} (ref 1.1–2.2)
ALP SERPL-CCNC: 71 U/L (ref 40–129)
ALT SERPL-CCNC: 13 U/L (ref 10–40)
ANION GAP SERPL CALCULATED.3IONS-SCNC: 13 MMOL/L (ref 3–16)
AST SERPL-CCNC: 12 U/L (ref 15–37)
BILIRUB SERPL-MCNC: 0.4 MG/DL (ref 0–1)
BUN SERPL-MCNC: 16 MG/DL (ref 7–20)
CALCIUM SERPL-MCNC: 10.3 MG/DL (ref 8.3–10.6)
CHLORIDE SERPL-SCNC: 102 MMOL/L (ref 99–110)
CHOLEST SERPL-MCNC: 180 MG/DL (ref 0–199)
CO2 SERPL-SCNC: 26 MMOL/L (ref 21–32)
CREAT SERPL-MCNC: 0.9 MG/DL (ref 0.8–1.3)
CREAT UR-MCNC: 75.4 MG/DL (ref 39–259)
DEPRECATED RDW RBC AUTO: 14.6 % (ref 12.4–15.4)
EST. AVERAGE GLUCOSE BLD GHB EST-MCNC: 148.5 MG/DL
GFR SERPLBLD CREATININE-BSD FMLA CKD-EPI: 89 ML/MIN/{1.73_M2}
GLUCOSE SERPL-MCNC: 126 MG/DL (ref 70–99)
HBA1C MFR BLD: 6.8 %
HCT VFR BLD AUTO: 48 % (ref 40.5–52.5)
HDLC SERPL-MCNC: 60 MG/DL (ref 40–60)
HGB BLD-MCNC: 15.9 G/DL (ref 13.5–17.5)
LDLC SERPL CALC-MCNC: 96 MG/DL
MCH RBC QN AUTO: 31.9 PG (ref 26–34)
MCHC RBC AUTO-ENTMCNC: 33.1 G/DL (ref 31–36)
MCV RBC AUTO: 96.4 FL (ref 80–100)
MICROALBUMIN UR DL<=1MG/L-MCNC: <1.2 MG/DL
MICROALBUMIN/CREAT UR: NORMAL MG/G (ref 0–30)
PLATELET # BLD AUTO: 269 K/UL (ref 135–450)
PMV BLD AUTO: 7.2 FL (ref 5–10.5)
POTASSIUM SERPL-SCNC: 4.7 MMOL/L (ref 3.5–5.1)
PROT SERPL-MCNC: 6.9 G/DL (ref 6.4–8.2)
RBC # BLD AUTO: 4.98 M/UL (ref 4.2–5.9)
SODIUM SERPL-SCNC: 141 MMOL/L (ref 136–145)
TRIGL SERPL-MCNC: 118 MG/DL (ref 0–150)
VLDLC SERPL CALC-MCNC: 24 MG/DL
WBC # BLD AUTO: 9.4 K/UL (ref 4–11)

## 2024-12-04 PROCEDURE — 1036F TOBACCO NON-USER: CPT | Performed by: FAMILY MEDICINE

## 2024-12-04 PROCEDURE — 3074F SYST BP LT 130 MM HG: CPT | Performed by: FAMILY MEDICINE

## 2024-12-04 PROCEDURE — 3044F HG A1C LEVEL LT 7.0%: CPT | Performed by: FAMILY MEDICINE

## 2024-12-04 PROCEDURE — 3078F DIAST BP <80 MM HG: CPT | Performed by: FAMILY MEDICINE

## 2024-12-04 PROCEDURE — G8417 CALC BMI ABV UP PARAM F/U: HCPCS | Performed by: FAMILY MEDICINE

## 2024-12-04 PROCEDURE — 2022F DILAT RTA XM EVC RTNOPTHY: CPT | Performed by: FAMILY MEDICINE

## 2024-12-04 PROCEDURE — G8482 FLU IMMUNIZE ORDER/ADMIN: HCPCS | Performed by: FAMILY MEDICINE

## 2024-12-04 PROCEDURE — 3017F COLORECTAL CA SCREEN DOC REV: CPT | Performed by: FAMILY MEDICINE

## 2024-12-04 PROCEDURE — 1123F ACP DISCUSS/DSCN MKR DOCD: CPT | Performed by: FAMILY MEDICINE

## 2024-12-04 PROCEDURE — G8427 DOCREV CUR MEDS BY ELIG CLIN: HCPCS | Performed by: FAMILY MEDICINE

## 2024-12-04 PROCEDURE — 99214 OFFICE O/P EST MOD 30 MIN: CPT | Performed by: FAMILY MEDICINE

## 2024-12-04 PROCEDURE — 1159F MED LIST DOCD IN RCRD: CPT | Performed by: FAMILY MEDICINE

## 2024-12-04 RX ORDER — LISINOPRIL 5 MG/1
5 TABLET ORAL DAILY
COMMUNITY

## 2024-12-04 ASSESSMENT — ENCOUNTER SYMPTOMS
SHORTNESS OF BREATH: 0
ABDOMINAL PAIN: 0
EYE PAIN: 0
BLOOD IN STOOL: 0
CHEST TIGHTNESS: 0
WHEEZING: 0
TROUBLE SWALLOWING: 0
DIARRHEA: 0
VOMITING: 0
NAUSEA: 0

## 2024-12-04 NOTE — PROGRESS NOTES
12/4/2024    Antonio Sandra    Chief Complaint   Patient presents with    4 month     4 month    Health Maintenance     Due for diabetic foot exam.  Sees Dr. Dior tomorrow.   Due for diabetic eye exam.  Sees eye dr tomorrow.     Discuss Labs     Had a home health kit from insurance sent to home. Results stated urine albumin-creatinine ratio test returned as 42 mg/g on 11/04/24. States he is not having any urinary issues.     Medication Check     Cardiologist wants him to stop lisinopril and start losartan. Would like to discuss your thoughts. Was having dizziness. The caridologist thought it was from the lisinopril.     Immunizations     Had last covid injection in April. Wants to know if he should have another.        HPI  History was obtained from the patient.  Antonio is a 74 y.o. male who presents today with routine recheck.  He has past history of diabetes mellitus type 2, BPH, hypertension, depression, remote PE by history, hyperlipidemia, sleep apnea, possible interstitial lung disease, and sleep apnea along with atherosclerotic vascular disease.  Patient's last A1c in August was 6.4%.  Home blood sugars been under 140 and admits to evaluation for home CPAP use for sleep apnea.  Patient's getting limited exercise would like to work on increasing.  Patient is going to get an appointment to see podiatrist Dr. Garcia for diabetic foot exam and his regular eye doctor who can do the diabetic eye exam.  On review he should get this years COVID shot and okay for him to try stopping lisinopril and switching to losartan to see if that makes a difference in intermittent dizziness symptoms.  If he does this for 3 to 4-week and does make a difference should return to normal dose of lisinopril.    REVIEW OF SYMPTOMS    Review of Systems   Constitutional:  Negative for activity change and fatigue.   HENT:  Negative for congestion, hearing loss, mouth sores and trouble swallowing.    Eyes:  Negative for pain and visual

## 2024-12-23 DIAGNOSIS — E11.9 TYPE 2 DIABETES MELLITUS WITHOUT COMPLICATION, WITHOUT LONG-TERM CURRENT USE OF INSULIN (HCC): ICD-10-CM

## 2024-12-23 RX ORDER — DAPAGLIFLOZIN 10 MG/1
10 TABLET, FILM COATED ORAL EVERY MORNING
Qty: 90 TABLET | Refills: 0 | Status: SHIPPED | OUTPATIENT
Start: 2024-12-23

## 2024-12-23 NOTE — TELEPHONE ENCOUNTER
Medication:   Requested Prescriptions     Pending Prescriptions Disp Refills    FARXIGA 10 MG tablet [Pharmacy Med Name: FARXIGA 10 MG TABLET] 90 tablet 0     Sig: TAKE 1 TABLET BY MOUTH EVERY DAY IN THE MORNING        Last Filled:      Patient Phone Number: [phone] (home)     Last appt: 12/4/2024   Next appt: 4/4/2025    Last OARRS:        No data to display

## 2025-02-14 ENCOUNTER — HOSPITAL ENCOUNTER (OUTPATIENT)
Dept: SLEEP CENTER | Age: 75
Discharge: HOME | End: 2025-02-14
Payer: MEDICARE

## 2025-02-14 DIAGNOSIS — G47.33 OSA (OBSTRUCTIVE SLEEP APNEA): ICD-10-CM

## 2025-02-14 PROCEDURE — 95811 POLYSOM 6/>YRS CPAP 4/> PARM: CPT

## 2025-02-21 ENCOUNTER — OFFICE VISIT (OUTPATIENT)
Dept: PULMONOLOGY | Age: 75
End: 2025-02-21
Payer: MEDICARE

## 2025-02-21 VITALS
SYSTOLIC BLOOD PRESSURE: 118 MMHG | RESPIRATION RATE: 16 BRPM | OXYGEN SATURATION: 96 % | DIASTOLIC BLOOD PRESSURE: 72 MMHG | HEART RATE: 80 BPM

## 2025-02-21 DIAGNOSIS — G47.10 HYPERSOMNIA: ICD-10-CM

## 2025-02-21 DIAGNOSIS — G47.33 OSA (OBSTRUCTIVE SLEEP APNEA): Primary | ICD-10-CM

## 2025-02-21 DIAGNOSIS — J84.9 ILD (INTERSTITIAL LUNG DISEASE) (HCC): ICD-10-CM

## 2025-02-21 DIAGNOSIS — E66.9 OBESITY (BMI 30-39.9): ICD-10-CM

## 2025-02-21 PROCEDURE — 1123F ACP DISCUSS/DSCN MKR DOCD: CPT | Performed by: INTERNAL MEDICINE

## 2025-02-21 PROCEDURE — 3078F DIAST BP <80 MM HG: CPT | Performed by: INTERNAL MEDICINE

## 2025-02-21 PROCEDURE — 3074F SYST BP LT 130 MM HG: CPT | Performed by: INTERNAL MEDICINE

## 2025-02-21 PROCEDURE — G8417 CALC BMI ABV UP PARAM F/U: HCPCS | Performed by: INTERNAL MEDICINE

## 2025-02-21 PROCEDURE — 3017F COLORECTAL CA SCREEN DOC REV: CPT | Performed by: INTERNAL MEDICINE

## 2025-02-21 PROCEDURE — 1159F MED LIST DOCD IN RCRD: CPT | Performed by: INTERNAL MEDICINE

## 2025-02-21 PROCEDURE — 99215 OFFICE O/P EST HI 40 MIN: CPT | Performed by: INTERNAL MEDICINE

## 2025-02-21 PROCEDURE — G8427 DOCREV CUR MEDS BY ELIG CLIN: HCPCS | Performed by: INTERNAL MEDICINE

## 2025-02-21 PROCEDURE — 1036F TOBACCO NON-USER: CPT | Performed by: INTERNAL MEDICINE

## 2025-02-21 ASSESSMENT — ENCOUNTER SYMPTOMS
BACK PAIN: 0
ABDOMINAL PAIN: 0
EYE DISCHARGE: 0
COUGH: 0
ABDOMINAL DISTENTION: 0
SHORTNESS OF BREATH: 0
EYE ITCHING: 0

## 2025-02-21 NOTE — PROGRESS NOTES
headaches.   Hematological:  Negative for adenopathy.   Psychiatric/Behavioral:  Negative for agitation and behavioral problems.        Objective:   /72   Pulse 80   Resp 16   SpO2 96%   There is no height or weight on file to calculate BMI.      8/1/2024     2:16 PM   Sleep Medicine   Sitting and reading 3   Watching TV 3   Sitting, inactive in a public place (e.g. a theatre or a meeting) 0   As a passenger in a car for an hour without a break 0   Lying down to rest in the afternoon when circumstances permit 3   Sitting and talking to someone 0   Sitting quietly after a lunch without alcohol 2   In a car, while stopped for a few minutes in traffic 0   [place] Sleepiness Score 11   Neck (Inches) 16     Mallampati 3    Physical Exam  Vitals reviewed.   Constitutional:       Appearance: Normal appearance.   HENT:      Head: Normocephalic and atraumatic.      Nose: Nose normal.      Mouth/Throat:      Mouth: Mucous membranes are moist.   Eyes:      Extraocular Movements: Extraocular movements intact.      Pupils: Pupils are equal, round, and reactive to light.   Cardiovascular:      Rate and Rhythm: Normal rate and regular rhythm.      Pulses: Normal pulses.      Heart sounds: Normal heart sounds.   Pulmonary:      Effort: Pulmonary effort is normal.      Breath sounds: Normal breath sounds.   Abdominal:      General: Abdomen is flat.      Palpations: Abdomen is soft.   Musculoskeletal:         General: Normal range of motion.      Cervical back: Normal range of motion and neck supple.   Skin:     General: Skin is warm and dry.   Neurological:      General: No focal deficit present.      Mental Status: He is alert and oriented to person, place, and time.   Psychiatric:         Mood and Affect: Mood normal.         Behavior: Behavior normal.         Radiology: None    Assessment and Plan     Problem List             Diagnosed       Respiratory    ILD (interstitial lung disease) (HCC)       He has no

## 2025-03-19 DIAGNOSIS — E11.9 TYPE 2 DIABETES MELLITUS WITHOUT COMPLICATION, WITHOUT LONG-TERM CURRENT USE OF INSULIN: ICD-10-CM

## 2025-03-19 DIAGNOSIS — I10 HYPERTENSION, ESSENTIAL: ICD-10-CM

## 2025-03-19 RX ORDER — DAPAGLIFLOZIN 10 MG/1
10 TABLET, FILM COATED ORAL EVERY MORNING
Qty: 90 TABLET | Refills: 0 | Status: SHIPPED | OUTPATIENT
Start: 2025-03-19

## 2025-03-19 RX ORDER — DILTIAZEM HYDROCHLORIDE 300 MG/1
300 CAPSULE, COATED, EXTENDED RELEASE ORAL DAILY
Qty: 90 CAPSULE | Refills: 1 | Status: SHIPPED | OUTPATIENT
Start: 2025-03-19

## 2025-03-19 RX ORDER — ASPIRIN 81 MG/1
81 TABLET ORAL EVERY OTHER DAY
Qty: 90 TABLET | Refills: 1 | Status: SHIPPED | OUTPATIENT
Start: 2025-03-19

## 2025-04-01 SDOH — ECONOMIC STABILITY: INCOME INSECURITY: IN THE LAST 12 MONTHS, WAS THERE A TIME WHEN YOU WERE NOT ABLE TO PAY THE MORTGAGE OR RENT ON TIME?: NO

## 2025-04-01 SDOH — ECONOMIC STABILITY: TRANSPORTATION INSECURITY
IN THE PAST 12 MONTHS, HAS THE LACK OF TRANSPORTATION KEPT YOU FROM MEDICAL APPOINTMENTS OR FROM GETTING MEDICATIONS?: NO

## 2025-04-01 SDOH — ECONOMIC STABILITY: FOOD INSECURITY: WITHIN THE PAST 12 MONTHS, YOU WORRIED THAT YOUR FOOD WOULD RUN OUT BEFORE YOU GOT MONEY TO BUY MORE.: NEVER TRUE

## 2025-04-01 SDOH — ECONOMIC STABILITY: FOOD INSECURITY: WITHIN THE PAST 12 MONTHS, THE FOOD YOU BOUGHT JUST DIDN'T LAST AND YOU DIDN'T HAVE MONEY TO GET MORE.: NEVER TRUE

## 2025-04-01 ASSESSMENT — PATIENT HEALTH QUESTIONNAIRE - PHQ9
1. LITTLE INTEREST OR PLEASURE IN DOING THINGS: NOT AT ALL
6. FEELING BAD ABOUT YOURSELF - OR THAT YOU ARE A FAILURE OR HAVE LET YOURSELF OR YOUR FAMILY DOWN: NOT AT ALL
2. FEELING DOWN, DEPRESSED OR HOPELESS: NOT AT ALL
7. TROUBLE CONCENTRATING ON THINGS, SUCH AS READING THE NEWSPAPER OR WATCHING TELEVISION: NOT AT ALL
8. MOVING OR SPEAKING SO SLOWLY THAT OTHER PEOPLE COULD HAVE NOTICED. OR THE OPPOSITE - BEING SO FIDGETY OR RESTLESS THAT YOU HAVE BEEN MOVING AROUND A LOT MORE THAN USUAL: NOT AT ALL
3. TROUBLE FALLING OR STAYING ASLEEP: SEVERAL DAYS
1. LITTLE INTEREST OR PLEASURE IN DOING THINGS: NOT AT ALL
7. TROUBLE CONCENTRATING ON THINGS, SUCH AS READING THE NEWSPAPER OR WATCHING TELEVISION: NOT AT ALL
10. IF YOU CHECKED OFF ANY PROBLEMS, HOW DIFFICULT HAVE THESE PROBLEMS MADE IT FOR YOU TO DO YOUR WORK, TAKE CARE OF THINGS AT HOME, OR GET ALONG WITH OTHER PEOPLE: NOT DIFFICULT AT ALL
8. MOVING OR SPEAKING SO SLOWLY THAT OTHER PEOPLE COULD HAVE NOTICED. OR THE OPPOSITE, BEING SO FIGETY OR RESTLESS THAT YOU HAVE BEEN MOVING AROUND A LOT MORE THAN USUAL: NOT AT ALL
10. IF YOU CHECKED OFF ANY PROBLEMS, HOW DIFFICULT HAVE THESE PROBLEMS MADE IT FOR YOU TO DO YOUR WORK, TAKE CARE OF THINGS AT HOME, OR GET ALONG WITH OTHER PEOPLE: NOT DIFFICULT AT ALL
SUM OF ALL RESPONSES TO PHQ QUESTIONS 1-9: 2
5. POOR APPETITE OR OVEREATING: NOT AT ALL
9. THOUGHTS THAT YOU WOULD BE BETTER OFF DEAD, OR OF HURTING YOURSELF: NOT AT ALL
5. POOR APPETITE OR OVEREATING: NOT AT ALL
SUM OF ALL RESPONSES TO PHQ QUESTIONS 1-9: 2
4. FEELING TIRED OR HAVING LITTLE ENERGY: SEVERAL DAYS
4. FEELING TIRED OR HAVING LITTLE ENERGY: SEVERAL DAYS
6. FEELING BAD ABOUT YOURSELF - OR THAT YOU ARE A FAILURE OR HAVE LET YOURSELF OR YOUR FAMILY DOWN: NOT AT ALL
SUM OF ALL RESPONSES TO PHQ QUESTIONS 1-9: 2
3. TROUBLE FALLING OR STAYING ASLEEP: SEVERAL DAYS
9. THOUGHTS THAT YOU WOULD BE BETTER OFF DEAD, OR OF HURTING YOURSELF: NOT AT ALL
SUM OF ALL RESPONSES TO PHQ QUESTIONS 1-9: 2
2. FEELING DOWN, DEPRESSED OR HOPELESS: NOT AT ALL
SUM OF ALL RESPONSES TO PHQ QUESTIONS 1-9: 2

## 2025-04-04 ENCOUNTER — OFFICE VISIT (OUTPATIENT)
Dept: FAMILY MEDICINE CLINIC | Age: 75
End: 2025-04-04
Payer: MEDICARE

## 2025-04-04 ENCOUNTER — OFFICE VISIT (OUTPATIENT)
Dept: FAMILY MEDICINE CLINIC | Age: 75
End: 2025-04-04

## 2025-04-04 VITALS
HEART RATE: 93 BPM | BODY MASS INDEX: 32.71 KG/M2 | DIASTOLIC BLOOD PRESSURE: 64 MMHG | SYSTOLIC BLOOD PRESSURE: 124 MMHG | WEIGHT: 241.5 LBS | HEIGHT: 72 IN | OXYGEN SATURATION: 96 %

## 2025-04-04 VITALS
HEIGHT: 72 IN | BODY MASS INDEX: 32.71 KG/M2 | RESPIRATION RATE: 20 BRPM | OXYGEN SATURATION: 96 % | HEART RATE: 93 BPM | DIASTOLIC BLOOD PRESSURE: 64 MMHG | SYSTOLIC BLOOD PRESSURE: 124 MMHG | WEIGHT: 241.5 LBS

## 2025-04-04 DIAGNOSIS — I10 HYPERTENSION, ESSENTIAL: ICD-10-CM

## 2025-04-04 DIAGNOSIS — E66.9 OBESITY (BMI 30-39.9): ICD-10-CM

## 2025-04-04 DIAGNOSIS — E11.69 TYPE 2 DIABETES MELLITUS WITH OTHER SPECIFIED COMPLICATION, WITHOUT LONG-TERM CURRENT USE OF INSULIN: Primary | ICD-10-CM

## 2025-04-04 DIAGNOSIS — N40.0 BENIGN PROSTATIC HYPERPLASIA WITHOUT LOWER URINARY TRACT SYMPTOMS: Chronic | ICD-10-CM

## 2025-04-04 DIAGNOSIS — G47.33 OSA (OBSTRUCTIVE SLEEP APNEA): ICD-10-CM

## 2025-04-04 DIAGNOSIS — Z00.00 MEDICARE ANNUAL WELLNESS VISIT, SUBSEQUENT: Primary | ICD-10-CM

## 2025-04-04 DIAGNOSIS — I25.10 ASCVD (ARTERIOSCLEROTIC CARDIOVASCULAR DISEASE): ICD-10-CM

## 2025-04-04 DIAGNOSIS — E11.69 TYPE 2 DIABETES MELLITUS WITH OTHER SPECIFIED COMPLICATION, WITHOUT LONG-TERM CURRENT USE OF INSULIN: ICD-10-CM

## 2025-04-04 LAB
ALBUMIN SERPL-MCNC: 4.7 G/DL (ref 3.4–5)
ALBUMIN/GLOB SERPL: 2.1 {RATIO} (ref 1.1–2.2)
ALP SERPL-CCNC: 67 U/L (ref 40–129)
ALT SERPL-CCNC: 16 U/L (ref 10–40)
ANION GAP SERPL CALCULATED.3IONS-SCNC: 12 MMOL/L (ref 3–16)
AST SERPL-CCNC: 13 U/L (ref 15–37)
BILIRUB SERPL-MCNC: 0.5 MG/DL (ref 0–1)
BUN SERPL-MCNC: 16 MG/DL (ref 7–20)
CALCIUM SERPL-MCNC: 9.9 MG/DL (ref 8.3–10.6)
CHLORIDE SERPL-SCNC: 104 MMOL/L (ref 99–110)
CO2 SERPL-SCNC: 25 MMOL/L (ref 21–32)
CREAT SERPL-MCNC: 0.9 MG/DL (ref 0.8–1.3)
DEPRECATED RDW RBC AUTO: 14.5 % (ref 12.4–15.4)
EST. AVERAGE GLUCOSE BLD GHB EST-MCNC: 151.3 MG/DL
GFR SERPLBLD CREATININE-BSD FMLA CKD-EPI: 89 ML/MIN/{1.73_M2}
GLUCOSE SERPL-MCNC: 123 MG/DL (ref 70–99)
HBA1C MFR BLD: 6.9 %
HCT VFR BLD AUTO: 46.4 % (ref 40.5–52.5)
HGB BLD-MCNC: 15.6 G/DL (ref 13.5–17.5)
MCH RBC QN AUTO: 32.3 PG (ref 26–34)
MCHC RBC AUTO-ENTMCNC: 33.7 G/DL (ref 31–36)
MCV RBC AUTO: 95.7 FL (ref 80–100)
PLATELET # BLD AUTO: 260 K/UL (ref 135–450)
PMV BLD AUTO: 7.5 FL (ref 5–10.5)
POTASSIUM SERPL-SCNC: 4.4 MMOL/L (ref 3.5–5.1)
PROT SERPL-MCNC: 6.9 G/DL (ref 6.4–8.2)
RBC # BLD AUTO: 4.85 M/UL (ref 4.2–5.9)
SODIUM SERPL-SCNC: 141 MMOL/L (ref 136–145)
WBC # BLD AUTO: 7.4 K/UL (ref 4–11)

## 2025-04-04 PROCEDURE — 1123F ACP DISCUSS/DSCN MKR DOCD: CPT | Performed by: FAMILY MEDICINE

## 2025-04-04 PROCEDURE — 3074F SYST BP LT 130 MM HG: CPT | Performed by: FAMILY MEDICINE

## 2025-04-04 PROCEDURE — G0439 PPPS, SUBSEQ VISIT: HCPCS | Performed by: FAMILY MEDICINE

## 2025-04-04 PROCEDURE — 3017F COLORECTAL CA SCREEN DOC REV: CPT | Performed by: FAMILY MEDICINE

## 2025-04-04 PROCEDURE — 3078F DIAST BP <80 MM HG: CPT | Performed by: FAMILY MEDICINE

## 2025-04-04 PROCEDURE — 1159F MED LIST DOCD IN RCRD: CPT | Performed by: FAMILY MEDICINE

## 2025-04-04 ASSESSMENT — PATIENT HEALTH QUESTIONNAIRE - PHQ9
10. IF YOU CHECKED OFF ANY PROBLEMS, HOW DIFFICULT HAVE THESE PROBLEMS MADE IT FOR YOU TO DO YOUR WORK, TAKE CARE OF THINGS AT HOME, OR GET ALONG WITH OTHER PEOPLE: NOT DIFFICULT AT ALL
SUM OF ALL RESPONSES TO PHQ QUESTIONS 1-9: 2
8. MOVING OR SPEAKING SO SLOWLY THAT OTHER PEOPLE COULD HAVE NOTICED. OR THE OPPOSITE, BEING SO FIGETY OR RESTLESS THAT YOU HAVE BEEN MOVING AROUND A LOT MORE THAN USUAL: NOT AT ALL
7. TROUBLE CONCENTRATING ON THINGS, SUCH AS READING THE NEWSPAPER OR WATCHING TELEVISION: NOT AT ALL
6. FEELING BAD ABOUT YOURSELF - OR THAT YOU ARE A FAILURE OR HAVE LET YOURSELF OR YOUR FAMILY DOWN: NOT AT ALL
1. LITTLE INTEREST OR PLEASURE IN DOING THINGS: NOT AT ALL
4. FEELING TIRED OR HAVING LITTLE ENERGY: SEVERAL DAYS
5. POOR APPETITE OR OVEREATING: NOT AT ALL
9. THOUGHTS THAT YOU WOULD BE BETTER OFF DEAD, OR OF HURTING YOURSELF: NOT AT ALL
3. TROUBLE FALLING OR STAYING ASLEEP: SEVERAL DAYS
SUM OF ALL RESPONSES TO PHQ QUESTIONS 1-9: 2
2. FEELING DOWN, DEPRESSED OR HOPELESS: NOT AT ALL

## 2025-04-04 ASSESSMENT — ENCOUNTER SYMPTOMS
CHEST TIGHTNESS: 0
ABDOMINAL PAIN: 0
SHORTNESS OF BREATH: 0
NAUSEA: 0
APNEA: 1
WHEEZING: 0
VOMITING: 0
EYE PAIN: 0
DIARRHEA: 0
TROUBLE SWALLOWING: 0
BLOOD IN STOOL: 0

## 2025-04-04 NOTE — PROGRESS NOTES
4/4/2025    Antonio Sandra    Chief Complaint   Patient presents with    4 month     4 month    CPAP/BiPAP     Becoming used to cpap. Not helping him sleep.        HPI  History was obtained from the patient.  Antonio is a 74 y.o. male who presents today with routine recheck.  He has history of diabetes mellitus type 2, BPH, depression, AAA, interstitial lung disease, sleep apnea on CPAP getting used to CPAP machine per Dr. Chaney, and known atherosclerotic vascular disease.  Overall has been feeling reasonable blood sugars have been fair last labs were in December 2024.    REVIEW OF SYMPTOMS    Review of Systems   Constitutional:  Negative for activity change and fatigue.   HENT:  Negative for congestion, hearing loss, mouth sores and trouble swallowing.    Eyes:  Negative for pain and visual disturbance.   Respiratory:  Positive for apnea. Negative for chest tightness, shortness of breath and wheezing.    Cardiovascular:  Negative for chest pain and palpitations.   Gastrointestinal:  Negative for abdominal pain, blood in stool, diarrhea, nausea and vomiting.   Endocrine: Negative for polydipsia and polyuria.   Genitourinary:  Negative for dysuria, frequency and urgency.   Musculoskeletal:  Negative for arthralgias, gait problem and neck stiffness.   Skin:  Negative for rash.   Allergic/Immunologic: Negative for environmental allergies.   Neurological:  Negative for dizziness, seizures, speech difficulty and weakness.   Hematological:  Does not bruise/bleed easily.   Psychiatric/Behavioral:  Negative for agitation, confusion, hallucinations and suicidal ideas.        PAST MEDICAL HISTORY  Past Medical History:   Diagnosis Date    Benign prostatic hyperplasia     DVT (deep venous thrombosis) ([facility])     H/O echocardiogram 05/01/2024    EF of 55 - 60%. Aorta: Normal sized aortic root. Dilated ascending aorta. Ao ascending diameter is 4.0 cm. Aortic Valve: Mild sclerosis of the aortic valve, noncoronary cusp. Tricuspid

## 2025-04-04 NOTE — PROGRESS NOTES
Medicare Annual Wellness Visit    Antonio Sandra is here for Medicare AWV    Assessment & Plan   Medicare annual wellness visit, subsequent     No follow-ups on file.     Subjective       Patient's complete Health Risk Assessment and screening values have been reviewed and are found in Flowsheets. The following problems were reviewed today and where indicated follow up appointments were made and/or referrals ordered.    Positive Risk Factor Screenings with Interventions:    Fall Risk:  Do you feel unsteady or are you worried about falling? : (!) yes (a little, no cane needed)  2 or more falls in past year?: no  Fall with injury in past year?: no     Interventions:    Patient comments: states he does feel a little unsteady at times, but, does not feel like he needs a cane.  Reviewed medications, home hazards, visual acuity, and co-morbidities that can increase risk for falls  See AVS for additional education material             Inactivity:  On average, how many days per week do you engage in moderate to strenuous exercise (like a brisk walk)?: 2 days (!) Abnormal  On average, how many minutes do you engage in exercise at this level?: 60 min  Interventions:  See AVS for additional education material     Abnormal BMI (obese):  Body mass index is 32.75 kg/m². (!) Abnormal  Interventions:  See AVS for additional education material                           Objective   Vitals:    04/04/25 1012   BP: 124/64   Pulse: 93   SpO2: 96%   Weight: 109.5 kg (241 lb 8 oz)   Height: 1.829 m (6')      Body mass index is 32.75 kg/m².                    Allergies   Allergen Reactions    Tree Extract     Vicodin [Hydrocodone-Acetaminophen] Nausea And Vomiting     Prior to Visit Medications    Medication Sig Taking? Authorizing Provider   dilTIAZem (CARDIZEM CD) 300 MG extended release capsule Take 1 capsule by mouth daily Yes Itz Amaro MD   dapagliflozin (FARXIGA) 10 MG tablet Take 1 tablet by mouth every morning Yes Sondra

## 2025-04-26 DIAGNOSIS — E78.49 OTHER HYPERLIPIDEMIA: ICD-10-CM

## 2025-04-29 ENCOUNTER — OFFICE VISIT (OUTPATIENT)
Dept: PULMONOLOGY | Age: 75
End: 2025-04-29
Payer: MEDICARE

## 2025-04-29 VITALS
WEIGHT: 240 LBS | HEIGHT: 72 IN | SYSTOLIC BLOOD PRESSURE: 118 MMHG | HEART RATE: 88 BPM | OXYGEN SATURATION: 95 % | RESPIRATION RATE: 16 BRPM | BODY MASS INDEX: 32.51 KG/M2 | DIASTOLIC BLOOD PRESSURE: 70 MMHG

## 2025-04-29 DIAGNOSIS — G47.10 HYPERSOMNIA: ICD-10-CM

## 2025-04-29 DIAGNOSIS — E66.9 OBESITY (BMI 30-39.9): ICD-10-CM

## 2025-04-29 DIAGNOSIS — G47.33 OSA (OBSTRUCTIVE SLEEP APNEA): ICD-10-CM

## 2025-04-29 DIAGNOSIS — J84.9 ILD (INTERSTITIAL LUNG DISEASE) (HCC): Primary | ICD-10-CM

## 2025-04-29 PROCEDURE — 99215 OFFICE O/P EST HI 40 MIN: CPT | Performed by: INTERNAL MEDICINE

## 2025-04-29 PROCEDURE — G8427 DOCREV CUR MEDS BY ELIG CLIN: HCPCS | Performed by: INTERNAL MEDICINE

## 2025-04-29 PROCEDURE — G8417 CALC BMI ABV UP PARAM F/U: HCPCS | Performed by: INTERNAL MEDICINE

## 2025-04-29 PROCEDURE — 3078F DIAST BP <80 MM HG: CPT | Performed by: INTERNAL MEDICINE

## 2025-04-29 PROCEDURE — 1123F ACP DISCUSS/DSCN MKR DOCD: CPT | Performed by: INTERNAL MEDICINE

## 2025-04-29 PROCEDURE — 3017F COLORECTAL CA SCREEN DOC REV: CPT | Performed by: INTERNAL MEDICINE

## 2025-04-29 PROCEDURE — 1159F MED LIST DOCD IN RCRD: CPT | Performed by: INTERNAL MEDICINE

## 2025-04-29 PROCEDURE — 3074F SYST BP LT 130 MM HG: CPT | Performed by: INTERNAL MEDICINE

## 2025-04-29 PROCEDURE — 1036F TOBACCO NON-USER: CPT | Performed by: INTERNAL MEDICINE

## 2025-04-29 RX ORDER — ATORVASTATIN CALCIUM 20 MG/1
TABLET, FILM COATED ORAL
Qty: 90 TABLET | Refills: 1 | Status: SHIPPED | OUTPATIENT
Start: 2025-04-29

## 2025-04-29 ASSESSMENT — ENCOUNTER SYMPTOMS
EYE ITCHING: 0
ABDOMINAL DISTENTION: 0
EYE DISCHARGE: 0
BACK PAIN: 0
SHORTNESS OF BREATH: 0
COUGH: 0
ABDOMINAL PAIN: 0

## 2025-04-29 NOTE — PROGRESS NOTES
Antonio Sandra  1950  Referring Provider: Itz Amaro, [facility] - General     Subjective:     Chief Complaint   Patient presents with    Follow-up    Sleep Apnea     Compliance check, new set up through [facility] on 02/2025       DEAN Alvarez is a 74 y.o. male has come back as a follow up. He has Mild ASIA with EDS on a CPAP of 7 cm h20 which he is using it every night about 7 hours. He says that it is helping him.  He has no loss of weight. He is less sleepy and tired during the day time. His 2 week download data showed a residual AHI is 1.9 and leak is 7.3 L/min.       He has suspected early ILD with GG opacities. He has no symptoms.     Current Outpatient Medications   Medication Sig Dispense Refill    dilTIAZem (CARDIZEM CD) 300 MG extended release capsule Take 1 capsule by mouth daily 90 capsule 1    dapagliflozin (FARXIGA) 10 MG tablet Take 1 tablet by mouth every morning 90 tablet 0    aspirin 81 MG EC tablet Take 1 tablet by mouth every other day 90 tablet 1    losartan (COZAAR) 25 MG tablet Take 1 tablet by mouth daily 90 tablet 1    pioglitazone (ACTOS) 30 MG tablet Take 1 tablet by mouth daily 90 tablet 1    glipiZIDE (GLUCOTROL XL) 2.5 MG extended release tablet TAKE 1 TABLET BY MOUTH DAILY 90 tablet 1    atorvastatin (LIPITOR) 20 MG tablet TAKE 1 TABLET BY MOUTH ONE TIME A DAY 90 tablet 1    metFORMIN (GLUCOPHAGE) 500 MG tablet TAKE 2 TABS BY MOUTH IN THE MORNING, 1 TAB AT NOON, AND 2 TABS IN THE EVENING WITH MEALS (Patient taking differently: TAKE 2 TABS BY MOUTH IN THE MORNING,AND 2 TABS IN THE EVENING WITH MEALS) 450 tablet 0    PSYLLIUM PO Take 500 mg by mouth in the morning and at bedtime      senna (SENOKOT) 8.6 MG tablet Take 1 tablet by mouth daily (Patient taking differently: Take 1 tablet by mouth as needed)      Multiple Vitamins-Minerals (THERAPEUTIC MULTIVITAMIN-MINERALS) tablet Take 1 tablet by mouth daily      COMBIGAN 0.2-0.5 % ophthalmic solution       LATANOPROST OP Apply to eye

## 2025-05-01 NOTE — PROGRESS NOTES
MRN: [id number]  Name: Antonio Sandra  : 1950    Insurance: Payor: [facility] MEDICARE /  /  /      Phone #: [phone]  Provider: MARIANELA Buenrostro CNP     Date of Visit: 2025    Reason for visit: 6 month f/u Recent Hospitalization Date:    Reason for Hospitalization:    Last EK24  Type of Device:       Vitals BP HR O2% WT HT ORTHO BP LYING ORTHO BP SITTING ORTHO BP SITTING   Today's Findings           Patients work up- Check List     Testing Last Date Completed Date Expected  ([facility]) Additional Notes    MA to document For provider to complete Either MA or Provider    Carotid Duplex  STAT 1 WK 6 MTH       THIS WK 2 WK 1 YEAR     Cardiac CTA  STAT 1 WK 6 MTH       THIS WK 2 WK 1 YEAR     Cardiac CT Calcium scoring  STAT 1 WK 6 MTH       THIS WK 2 WK 1 YEAR     CTA Chest, Abdomen & Pelvis  STAT 1 WK 6 MTH       THIS WK 2 WK 1 YEAR     CT Chest IV w/ Contrast  STAT 1 WK 6 MTH       THIS WK 2 WK 1 YEAR     CT Chest w/o Contrast  STAT 1 WK 6 MTH       THIS WK 2 WK 1 YEAR     CXR  STAT 1 WK 6 MTH       THIS WK 2 WK 1 YEAR     ECHO  Stress Complete Limited     MRI- Cardiac  STAT 1 WK 6 MTH       THIS WK 2 WK 1 YEAR     MUGA Scan  STAT 1 WK 6 MTH       THIS WK 2 WK 1 YEAR     Nuclear Stress  Lexiscan Cardiolite     PFT  STAT 1 WK 6 MTH       THIS WK 2 WK 1 YEAR     Treadmill Stress Test  STAT 1 WK 6 MTH       THIS WK 2 WK 1 YEAR     Vascular Duplex  Lower: Right Left Bilat       Upper: Right Left Bilat     Other Test Not Listed:    Monitors Last Date Completed Day's Request/Ordered     Holter  Short term 24 hours 48 hours      Long term 3 days 7 days 14 days   Event   (1-30 days)      Procedures Last Date Performed Procedure Details Date Expected   Additional Notes    ASD Closure        Carotid Angio        Cardioversion        Heart Cath  R L R&L      Peripheral Angio  R L      PFO Closure        PTCA/PCI        ED        ED/Cardioversion        Venogram        Tilt Table        Other Type of

## 2025-05-05 ENCOUNTER — OFFICE VISIT (OUTPATIENT)
Dept: FAMILY MEDICINE CLINIC | Age: 75
End: 2025-05-05
Payer: MEDICARE

## 2025-05-05 VITALS
SYSTOLIC BLOOD PRESSURE: 122 MMHG | WEIGHT: 238.4 LBS | HEART RATE: 103 BPM | DIASTOLIC BLOOD PRESSURE: 78 MMHG | TEMPERATURE: 97.7 F | OXYGEN SATURATION: 94 % | BODY MASS INDEX: 32.33 KG/M2

## 2025-05-05 DIAGNOSIS — J06.9 UPPER RESPIRATORY TRACT INFECTION, UNSPECIFIED TYPE: Primary | ICD-10-CM

## 2025-05-05 LAB
Lab: NORMAL
PERFORMING INSTRUMENT: NORMAL
QC PASS/FAIL: NORMAL
SARS-COV-2, POC: NORMAL

## 2025-05-05 PROCEDURE — 87426 SARSCOV CORONAVIRUS AG IA: CPT | Performed by: NURSE PRACTITIONER

## 2025-05-05 PROCEDURE — G8427 DOCREV CUR MEDS BY ELIG CLIN: HCPCS | Performed by: NURSE PRACTITIONER

## 2025-05-05 PROCEDURE — 3074F SYST BP LT 130 MM HG: CPT | Performed by: NURSE PRACTITIONER

## 2025-05-05 PROCEDURE — 1159F MED LIST DOCD IN RCRD: CPT | Performed by: NURSE PRACTITIONER

## 2025-05-05 PROCEDURE — 3017F COLORECTAL CA SCREEN DOC REV: CPT | Performed by: NURSE PRACTITIONER

## 2025-05-05 PROCEDURE — 1123F ACP DISCUSS/DSCN MKR DOCD: CPT | Performed by: NURSE PRACTITIONER

## 2025-05-05 PROCEDURE — 3078F DIAST BP <80 MM HG: CPT | Performed by: NURSE PRACTITIONER

## 2025-05-05 PROCEDURE — G8417 CALC BMI ABV UP PARAM F/U: HCPCS | Performed by: NURSE PRACTITIONER

## 2025-05-05 PROCEDURE — 99213 OFFICE O/P EST LOW 20 MIN: CPT | Performed by: NURSE PRACTITIONER

## 2025-05-05 PROCEDURE — 1036F TOBACCO NON-USER: CPT | Performed by: NURSE PRACTITIONER

## 2025-05-05 PROCEDURE — 1160F RVW MEDS BY RX/DR IN RCRD: CPT | Performed by: NURSE PRACTITIONER

## 2025-05-05 RX ORDER — BENZONATATE 100 MG/1
100 CAPSULE ORAL 3 TIMES DAILY PRN
Qty: 20 CAPSULE | Refills: 0 | Status: SHIPPED | OUTPATIENT
Start: 2025-05-05

## 2025-05-05 RX ORDER — AZITHROMYCIN 250 MG/1
TABLET, FILM COATED ORAL
Qty: 1 PACKET | Refills: 0 | Status: SHIPPED | OUTPATIENT
Start: 2025-05-05

## 2025-05-05 NOTE — PROGRESS NOTES
Antonio Sandra   74 y.o.  male  [id number]      Chief Complaint   Patient presents with    Cold Symptoms     Pt states having serious chest congestion, coughing, fever, chills- ongoing since last wednesday        Subjective:  74 y.o.male is here for a follow up. He has the following chronic/acute medical problems:  Patient Active Problem List   Diagnosis    DM (diabetes mellitus) (HCC)    BPH (benign prostatic hyperplasia)    Polycythemia    DVT (deep venous thrombosis)-R leg    Diarrhea    Pre-syncope    Type 2 diabetes mellitus (HCC)    Hypertension, essential    Hemochromatosis-heterozygous    Tinnitus    Major depressive disorder    Benign prostatic hyperplasia    History of pulmonary embolism    Mantoux: positive    Glaucoma    Chronic fatigue    Functional diarrhea    Other hyperlipidemia    Abdominal aortic aneurysm (AAA) without rupture    Diverticular disease    Fall at home, initial encounter    Atelectasis    Pulmonary nodules    Tachycardia    Abnormal EKG    Dyspnea    ILD (interstitial lung disease) (HCC)    ASIA (obstructive sleep apnea)    Hypersomnia    Obesity (BMI 30-39.9)    ASCVD (arteriosclerotic cardiovascular disease)       HPI   History of Present Illness  The patient presents for evaluation of cold symptoms.    He began experiencing symptoms of a common cold on 04/30/2025, which were accompanied by fever and chills over the weekend. His appetite remains unaffected, but he reports increased fatigue. He has a cough and congestion, with difficulty expectorating. There is no ear pain, but he does report mild postnasal drainage and rhinorrhea. He also experiences mild sinus pressure without associated pain, sneezing without sore throat, and shortness of breath, wheezing, and chest tightness. Additionally, he reports mild diarrhea but no body aches or headaches. He has been self-medicating with over-the-counter cough medicine and Mucinex.      Review of Systems See HPI    Current Outpatient

## 2025-05-06 ENCOUNTER — OFFICE VISIT (OUTPATIENT)
Dept: CARDIOLOGY CLINIC | Age: 75
End: 2025-05-06
Payer: MEDICARE

## 2025-05-06 VITALS
BODY MASS INDEX: 32.34 KG/M2 | DIASTOLIC BLOOD PRESSURE: 82 MMHG | WEIGHT: 238.8 LBS | HEART RATE: 92 BPM | SYSTOLIC BLOOD PRESSURE: 136 MMHG | HEIGHT: 72 IN

## 2025-05-06 DIAGNOSIS — I71.21 ANEURYSM OF ASCENDING AORTA WITHOUT RUPTURE: ICD-10-CM

## 2025-05-06 DIAGNOSIS — I25.10 ASCVD (ARTERIOSCLEROTIC CARDIOVASCULAR DISEASE): Primary | ICD-10-CM

## 2025-05-06 DIAGNOSIS — I71.40 ABDOMINAL AORTIC ANEURYSM (AAA) WITHOUT RUPTURE, UNSPECIFIED PART: ICD-10-CM

## 2025-05-06 DIAGNOSIS — E78.49 OTHER HYPERLIPIDEMIA: ICD-10-CM

## 2025-05-06 DIAGNOSIS — Z86.711 HISTORY OF PULMONARY EMBOLISM: ICD-10-CM

## 2025-05-06 DIAGNOSIS — R00.0 TACHYCARDIA: ICD-10-CM

## 2025-05-06 PROCEDURE — 99214 OFFICE O/P EST MOD 30 MIN: CPT | Performed by: NURSE PRACTITIONER

## 2025-05-06 PROCEDURE — 93000 ELECTROCARDIOGRAM COMPLETE: CPT | Performed by: NURSE PRACTITIONER

## 2025-05-06 PROCEDURE — G8428 CUR MEDS NOT DOCUMENT: HCPCS | Performed by: NURSE PRACTITIONER

## 2025-05-06 PROCEDURE — 3075F SYST BP GE 130 - 139MM HG: CPT | Performed by: NURSE PRACTITIONER

## 2025-05-06 PROCEDURE — 1036F TOBACCO NON-USER: CPT | Performed by: NURSE PRACTITIONER

## 2025-05-06 PROCEDURE — 1123F ACP DISCUSS/DSCN MKR DOCD: CPT | Performed by: NURSE PRACTITIONER

## 2025-05-06 PROCEDURE — G8417 CALC BMI ABV UP PARAM F/U: HCPCS | Performed by: NURSE PRACTITIONER

## 2025-05-06 PROCEDURE — 3017F COLORECTAL CA SCREEN DOC REV: CPT | Performed by: NURSE PRACTITIONER

## 2025-05-06 PROCEDURE — 3079F DIAST BP 80-89 MM HG: CPT | Performed by: NURSE PRACTITIONER

## 2025-05-06 ASSESSMENT — ENCOUNTER SYMPTOMS: COUGH: 0

## 2025-05-06 NOTE — PROGRESS NOTES
CARDIOLOGY  NOTE    2025    Antnoio Sandra (:  1950) is a 74 y.o. male,an established patient with Dr. Ramos, here for evaluation of the following chief complaint(s):  Follow-up and Shortness of Breath        SUBJECTIVE/OBJECTIVE:  History of Present Illness  The patient presents for evaluation of aneurysm, blood pressure, and cholesterol management.    He has been grappling with an upper respiratory infection since 2025. Medical attention was sought at a walk-in clinic on May 5, 2025, where medication was prescribed, leading to an improvement in his condition today compared to yesterday.    His last consultation with Dr. Ramos was in 2024, during which his health status was deemed stable. A CT scan is due between now and 2025, but it has not yet been scheduled. He reports no palpitations and maintains that his heart rate is within normal limits.    Blood pressure readings have been consistently within normal limits over the past month, with a reading of 118/78 on May 5, 2025, and 118/70 on 2025.    Cholesterol levels were checked in 2024, showing a slight upward trend since . He is currently on a regimen of atorvastatin, administered at night.       He has a history of aaa, BPH, DM, DVT, hemachromatosis, DVT with PE, HTN, Dyslipidemia, and tachycardia.     Review of Systems   Constitutional:  Negative for fatigue and fever.   Respiratory:  Negative for cough and shortness of breath.    Cardiovascular:  Negative for chest pain, palpitations and leg swelling.   Musculoskeletal:  Negative for arthralgias and gait problem.   Neurological:  Negative for dizziness, syncope, weakness, light-headedness and headaches.       Vitals:    25 0947   BP: 136/82   BP Site: Left Upper Arm   Patient Position: Sitting   BP Cuff Size: Medium Adult   Pulse: 92   Weight: 108.3 kg (238 lb 12.8 oz)   Height: 1.829 m (6')         Wt Readings from Last 3 Encounters:

## 2025-05-06 NOTE — PROGRESS NOTES
CLINICAL STAFF DOCUMENTATION    Maya Bustillo, [place]     Antonio Sandra  1950  [id number]    Have you had any Chest Pain recently? - No  Have you had any Shortness of Breath - Yes has upper respiratory infection which has made sob worsen.  When did it begin? - Months   If Yes - When on exertion  Have you had any dizziness - No  Have you had any palpitations recently? - No  Do you have any edema - swelling in No      Do you need any prescriptions refilled? - No    Do you have a surgery or procedure scheduled in the near future - No    Do use tobacco products? - No  Do you drink alcohol? - No  Do you use any illicit drugs? - No  Caffeine? - Yes  How much caffeine? .3  cups       Check medication list thoroughly!!! AND RECONCILE OUTSIDE MEDICATIONS  If dose has changed change the entire order not just the MG  BE SURE TO ASK PATIENT IF THEY NEED MEDICATION REFILLS  Verify Pharmacy and update if incorrect    Add to every patient's \"wrap up\" the following dot phrase AFTERVISITCARDIOHEARTHOUSE and ensure we explain this to our patients

## 2025-05-08 DIAGNOSIS — E11.9 TYPE 2 DIABETES MELLITUS WITHOUT COMPLICATION, WITHOUT LONG-TERM CURRENT USE OF INSULIN (HCC): ICD-10-CM

## 2025-05-09 RX ORDER — GLIPIZIDE 2.5 MG/1
2.5 TABLET, EXTENDED RELEASE ORAL DAILY
Qty: 90 TABLET | Refills: 1 | Status: SHIPPED | OUTPATIENT
Start: 2025-05-09 | End: 2025-11-05

## 2025-05-13 ENCOUNTER — RESULTS FOLLOW-UP (OUTPATIENT)
Dept: FAMILY MEDICINE CLINIC | Age: 75
End: 2025-05-13

## 2025-05-13 ENCOUNTER — HOSPITAL ENCOUNTER (OUTPATIENT)
Dept: GENERAL RADIOLOGY | Age: 75
Discharge: HOME | End: 2025-05-13
Payer: MEDICARE

## 2025-05-13 ENCOUNTER — OFFICE VISIT (OUTPATIENT)
Dept: FAMILY MEDICINE CLINIC | Age: 75
End: 2025-05-13
Payer: MEDICARE

## 2025-05-13 VITALS
WEIGHT: 237.8 LBS | TEMPERATURE: 97.7 F | SYSTOLIC BLOOD PRESSURE: 132 MMHG | HEART RATE: 93 BPM | OXYGEN SATURATION: 96 % | BODY MASS INDEX: 32.25 KG/M2 | DIASTOLIC BLOOD PRESSURE: 78 MMHG

## 2025-05-13 DIAGNOSIS — R05.1 ACUTE COUGH: ICD-10-CM

## 2025-05-13 DIAGNOSIS — R09.89 CHEST CONGESTION: ICD-10-CM

## 2025-05-13 DIAGNOSIS — J98.4 RESTRICTIVE LUNG DISEASE: Primary | ICD-10-CM

## 2025-05-13 DIAGNOSIS — R05.1 ACUTE COUGH: Primary | ICD-10-CM

## 2025-05-13 PROCEDURE — 3078F DIAST BP <80 MM HG: CPT | Performed by: NURSE PRACTITIONER

## 2025-05-13 PROCEDURE — 3017F COLORECTAL CA SCREEN DOC REV: CPT | Performed by: NURSE PRACTITIONER

## 2025-05-13 PROCEDURE — 3075F SYST BP GE 130 - 139MM HG: CPT | Performed by: NURSE PRACTITIONER

## 2025-05-13 PROCEDURE — 99213 OFFICE O/P EST LOW 20 MIN: CPT | Performed by: NURSE PRACTITIONER

## 2025-05-13 PROCEDURE — G8417 CALC BMI ABV UP PARAM F/U: HCPCS | Performed by: NURSE PRACTITIONER

## 2025-05-13 PROCEDURE — 1123F ACP DISCUSS/DSCN MKR DOCD: CPT | Performed by: NURSE PRACTITIONER

## 2025-05-13 PROCEDURE — 71046 X-RAY EXAM CHEST 2 VIEWS: CPT

## 2025-05-13 PROCEDURE — 1159F MED LIST DOCD IN RCRD: CPT | Performed by: NURSE PRACTITIONER

## 2025-05-13 PROCEDURE — 1160F RVW MEDS BY RX/DR IN RCRD: CPT | Performed by: NURSE PRACTITIONER

## 2025-05-13 PROCEDURE — 1036F TOBACCO NON-USER: CPT | Performed by: NURSE PRACTITIONER

## 2025-05-13 PROCEDURE — G8427 DOCREV CUR MEDS BY ELIG CLIN: HCPCS | Performed by: NURSE PRACTITIONER

## 2025-05-13 RX ORDER — ALBUTEROL SULFATE 90 UG/1
2 INHALANT RESPIRATORY (INHALATION) EVERY 6 HOURS PRN
Qty: 54 G | Refills: 1 | Status: SHIPPED | OUTPATIENT
Start: 2025-05-13

## 2025-05-13 NOTE — PROGRESS NOTES
Antonio Sandra   74 y.o.  male  [id number]      Chief Complaint   Patient presents with    Chest Congestion     Pt states was seen 05/05 for URI. Pt states is still having chest congestion and not loosening up- ongoing for a week now.        Subjective:  74 y.o.male is here for a follow up. He has the following chronic/acute medical problems:  Patient Active Problem List   Diagnosis    DM (diabetes mellitus) (HCC)    BPH (benign prostatic hyperplasia)    Polycythemia    DVT (deep venous thrombosis)-R leg    Diarrhea    Pre-syncope    Type 2 diabetes mellitus (HCC)    Hypertension, essential    Hemochromatosis-heterozygous    Tinnitus    Major depressive disorder    Benign prostatic hyperplasia    History of pulmonary embolism    Mantoux: positive    Glaucoma    Chronic fatigue    Functional diarrhea    Other hyperlipidemia    Abdominal aortic aneurysm (AAA) without rupture    Diverticular disease    Fall at home, initial encounter    Atelectasis    Pulmonary nodules    Tachycardia    Abnormal EKG    Dyspnea    ILD (interstitial lung disease) (HCC)    ASIA (obstructive sleep apnea)    Hypersomnia    Obesity (BMI 30-39.9)    ASCVD (arteriosclerotic cardiovascular disease)       HPI   History of Present Illness  The patient presents for evaluation of chest congestion and cough.    He was previously evaluated on 05/05/2025, at which time he presented with symptoms of chest congestion, cough, fever, and chills. He was prescribed an antibiotic regimen, which he has been adhering to. Additionally, he has been taking Mucinex and Tessalon Perles. Despite these interventions, he continues to experience chest congestion and a non-productive cough, describing a sensation of something being lodged in his chest. His use of a CPAP device has been compromised due to persistent coughing throughout the night, although there have been occasional instances of relief.    He also reports a runny nose but no nasal congestion or sore throat.

## 2025-05-22 RX ORDER — LOSARTAN POTASSIUM 25 MG/1
25 TABLET ORAL DAILY
Qty: 90 TABLET | Refills: 1 | Status: SHIPPED | OUTPATIENT
Start: 2025-05-22

## 2025-05-26 DIAGNOSIS — E11.9 TYPE 2 DIABETES MELLITUS WITHOUT COMPLICATION, WITHOUT LONG-TERM CURRENT USE OF INSULIN (HCC): ICD-10-CM

## 2025-06-03 RX ORDER — PIOGLITAZONE 30 MG/1
30 TABLET ORAL DAILY
Qty: 90 TABLET | Refills: 1 | Status: SHIPPED | OUTPATIENT
Start: 2025-06-03

## 2025-06-21 DIAGNOSIS — E11.9 TYPE 2 DIABETES MELLITUS WITHOUT COMPLICATION, WITHOUT LONG-TERM CURRENT USE OF INSULIN (HCC): ICD-10-CM

## 2025-06-24 RX ORDER — DAPAGLIFLOZIN 10 MG/1
10 TABLET, FILM COATED ORAL EVERY MORNING
Qty: 90 TABLET | Refills: 0 | Status: SHIPPED | OUTPATIENT
Start: 2025-06-24

## 2025-08-01 DIAGNOSIS — E78.49 OTHER HYPERLIPIDEMIA: ICD-10-CM

## 2025-08-01 RX ORDER — ATORVASTATIN CALCIUM 20 MG/1
TABLET, FILM COATED ORAL
Qty: 90 TABLET | Refills: 0 | Status: SHIPPED | OUTPATIENT
Start: 2025-08-01

## 2025-08-05 ENCOUNTER — HOSPITAL ENCOUNTER (OUTPATIENT)
Dept: CT IMAGING | Age: 75
Discharge: HOME | End: 2025-08-05
Attending: INTERNAL MEDICINE
Payer: MEDICARE

## 2025-08-05 ENCOUNTER — HOSPITAL ENCOUNTER (OUTPATIENT)
Dept: PULMONOLOGY | Age: 75
Discharge: HOME | End: 2025-08-05
Attending: INTERNAL MEDICINE
Payer: MEDICARE

## 2025-08-05 DIAGNOSIS — J84.9 ILD (INTERSTITIAL LUNG DISEASE) (HCC): ICD-10-CM

## 2025-08-05 LAB
DISTANCE WALKED: 865 FT
DLCO %PRED: 88 %
DLCO PRED: NORMAL
DLCO/VA %PRED: NORMAL
DLCO/VA PRED: NORMAL
DLCO/VA: NORMAL
DLCO: NORMAL
EXPIRATORY TIME-POST: NORMAL
EXPIRATORY TIME: NORMAL
FEF 25-75 %CHNG: NORMAL
FEF 25-75 POST %PRED: 188 %
FEF 25-75% %PRED-PRE: 150 L/SEC
FEF 25-75% PRED: NORMAL
FEF 25-75-POST: NORMAL
FEF 25-75-PRE: NORMAL
FEV1 %PRED-POST: 68 %
FEV1 %PRED-PRE: 64 %
FEV1 PRED: NORMAL
FEV1-POST: NORMAL
FEV1-PRE: NORMAL
FEV1/FVC %PRED-POST: 119 %
FEV1/FVC %PRED-PRE: 117 %
FEV1/FVC PRED: NORMAL
FEV1/FVC-POST: NORMAL
FEV1/FVC-PRE: NORMAL
FVC %PRED-POST: 56 L
FVC %PRED-PRE: 54 %
FVC PRED: NORMAL
FVC-POST: NORMAL
FVC-PRE: NORMAL
GAW %PRED: NORMAL
GAW PRED: NORMAL
GAW: NORMAL
IC PRE %PRED: NORMAL
IC PRED: NORMAL
IC: NORMAL
MEP: NORMAL
MIP: NORMAL
MVV %PRED-PRE: NORMAL
MVV PRED: NORMAL
MVV-PRE: NORMAL
PEF %PRED-POST: NORMAL
PEF %PRED-PRE: NORMAL
PEF PRED: NORMAL
PEF%CHNG: NORMAL
PEF-POST: NORMAL
PEF-PRE: NORMAL
RAW %PRED: NORMAL
RAW PRED: NORMAL
RAW: NORMAL
RV PRE %PRED: NORMAL
RV PRED: NORMAL
RV: NORMAL
SPO2: NORMAL %
SVC %PRED: 57 %
SVC PRED: NORMAL
SVC: NORMAL
TLC PRE %PRED: 62 %
TLC PRED: NORMAL
TLC: NORMAL
VA %PRED: NORMAL
VA PRED: NORMAL
VA: NORMAL
VTG %PRED: NORMAL
VTG PRED: NORMAL
VTG: NORMAL

## 2025-08-05 PROCEDURE — 94060 EVALUATION OF WHEEZING: CPT

## 2025-08-05 PROCEDURE — 94618 PULMONARY STRESS TESTING: CPT

## 2025-08-05 PROCEDURE — 71250 CT THORAX DX C-: CPT

## 2025-08-05 PROCEDURE — 94729 DIFFUSING CAPACITY: CPT

## 2025-08-05 PROCEDURE — 94726 PLETHYSMOGRAPHY LUNG VOLUMES: CPT

## 2025-08-05 ASSESSMENT — PULMONARY FUNCTION TESTS
FEV1_PERCENT_PREDICTED_POST: 68
FEV1/FVC_PERCENT_PREDICTED_PRE: 117
FEV1_PERCENT_PREDICTED_PRE: 64
FEV1/FVC_PERCENT_PREDICTED_POST: 119
FVC_PERCENT_PREDICTED_PRE: 54
FVC_PERCENT_PREDICTED_POST: 56

## 2025-08-29 ENCOUNTER — OFFICE VISIT (OUTPATIENT)
Dept: PULMONOLOGY | Age: 75
End: 2025-08-29
Payer: MEDICARE

## 2025-08-29 VITALS
SYSTOLIC BLOOD PRESSURE: 112 MMHG | OXYGEN SATURATION: 95 % | DIASTOLIC BLOOD PRESSURE: 72 MMHG | HEART RATE: 81 BPM | BODY MASS INDEX: 32.57 KG/M2 | WEIGHT: 240.5 LBS | HEIGHT: 72 IN

## 2025-08-29 DIAGNOSIS — E66.9 OBESITY (BMI 30-39.9): ICD-10-CM

## 2025-08-29 DIAGNOSIS — J84.9 ILD (INTERSTITIAL LUNG DISEASE) (HCC): ICD-10-CM

## 2025-08-29 DIAGNOSIS — G47.33 OSA (OBSTRUCTIVE SLEEP APNEA): Primary | ICD-10-CM

## 2025-08-29 DIAGNOSIS — G47.10 HYPERSOMNIA: ICD-10-CM

## 2025-08-29 PROCEDURE — 1159F MED LIST DOCD IN RCRD: CPT | Performed by: INTERNAL MEDICINE

## 2025-08-29 PROCEDURE — 3078F DIAST BP <80 MM HG: CPT | Performed by: INTERNAL MEDICINE

## 2025-08-29 PROCEDURE — 99215 OFFICE O/P EST HI 40 MIN: CPT | Performed by: INTERNAL MEDICINE

## 2025-08-29 PROCEDURE — G8427 DOCREV CUR MEDS BY ELIG CLIN: HCPCS | Performed by: INTERNAL MEDICINE

## 2025-08-29 PROCEDURE — 1036F TOBACCO NON-USER: CPT | Performed by: INTERNAL MEDICINE

## 2025-08-29 PROCEDURE — 3074F SYST BP LT 130 MM HG: CPT | Performed by: INTERNAL MEDICINE

## 2025-08-29 PROCEDURE — 1123F ACP DISCUSS/DSCN MKR DOCD: CPT | Performed by: INTERNAL MEDICINE

## 2025-08-29 PROCEDURE — G8417 CALC BMI ABV UP PARAM F/U: HCPCS | Performed by: INTERNAL MEDICINE

## 2025-08-29 RX ORDER — MODAFINIL 200 MG/1
200 TABLET ORAL DAILY
Qty: 30 TABLET | Refills: 0 | Status: SHIPPED | OUTPATIENT
Start: 2025-08-29 | End: 2025-09-28

## 2025-08-29 ASSESSMENT — ENCOUNTER SYMPTOMS
SHORTNESS OF BREATH: 0
COUGH: 0
BACK PAIN: 0
EYE ITCHING: 0
EYE DISCHARGE: 0
ABDOMINAL PAIN: 0
ABDOMINAL DISTENTION: 0